# Patient Record
Sex: MALE | Race: WHITE | Employment: FULL TIME | URBAN - METROPOLITAN AREA
[De-identification: names, ages, dates, MRNs, and addresses within clinical notes are randomized per-mention and may not be internally consistent; named-entity substitution may affect disease eponyms.]

---

## 2020-09-15 PROBLEM — J44.9 COPD (CHRONIC OBSTRUCTIVE PULMONARY DISEASE) (HCC): Status: ACTIVE | Noted: 2020-07-14

## 2020-09-15 PROBLEM — E34.9 HYPOTESTOSTERONEMIA: Status: ACTIVE | Noted: 2020-07-14

## 2020-09-15 PROBLEM — R73.02 IGT (IMPAIRED GLUCOSE TOLERANCE): Status: ACTIVE | Noted: 2020-09-15

## 2020-09-15 PROBLEM — G47.33 OSA (OBSTRUCTIVE SLEEP APNEA): Status: ACTIVE | Noted: 2020-08-11

## 2020-09-15 PROBLEM — I25.10 ASCVD (ARTERIOSCLEROTIC CARDIOVASCULAR DISEASE): Status: ACTIVE | Noted: 2020-07-14

## 2020-09-15 PROBLEM — F41.0 PANIC ANXIETY SYNDROME: Status: ACTIVE | Noted: 2020-07-14

## 2020-09-15 PROBLEM — E03.9 HYPOTHYROIDISM (ACQUIRED): Status: ACTIVE | Noted: 2020-09-15

## 2020-09-15 PROBLEM — N52.9 ED (ERECTILE DYSFUNCTION): Status: ACTIVE | Noted: 2020-09-15

## 2020-09-15 PROBLEM — G89.29 CHRONIC BACK PAIN: Status: ACTIVE | Noted: 2020-07-14

## 2020-09-15 PROBLEM — I10 HTN (HYPERTENSION): Status: ACTIVE | Noted: 2020-07-14

## 2020-09-15 PROBLEM — Z98.890 S/P LUMBAR SPINE OPERATION: Status: ACTIVE | Noted: 2020-09-15

## 2020-09-15 PROBLEM — E78.5 HLD (HYPERLIPIDEMIA): Status: ACTIVE | Noted: 2020-07-14

## 2020-09-15 PROBLEM — K21.9 GERD (GASTROESOPHAGEAL REFLUX DISEASE): Status: ACTIVE | Noted: 2020-09-15

## 2020-09-15 PROBLEM — M54.9 CHRONIC BACK PAIN: Status: ACTIVE | Noted: 2020-07-14

## 2020-09-29 PROBLEM — H10.10 ALLERGIC CONJUNCTIVITIS: Status: ACTIVE | Noted: 2020-09-29

## 2020-10-05 PROBLEM — R09.02 HYPOXIA: Status: ACTIVE | Noted: 2020-10-05

## 2020-10-05 PROBLEM — R07.89 ATYPICAL CHEST PAIN: Status: ACTIVE | Noted: 2020-10-05

## 2020-10-22 ENCOUNTER — TRANSCRIBE ORDER (OUTPATIENT)
Dept: SCHEDULING | Age: 55
End: 2020-10-22

## 2020-10-22 ENCOUNTER — HOSPITAL ENCOUNTER (OUTPATIENT)
Dept: MRI IMAGING | Age: 55
Discharge: HOME | End: 2020-10-22
Attending: ANESTHESIOLOGY
Payer: COMMERCIAL

## 2020-10-22 DIAGNOSIS — M54.16 LUMBAR RADICULOPATHY: ICD-10-CM

## 2020-10-22 DIAGNOSIS — M54.16 LUMBAR RADICULOPATHY: Primary | ICD-10-CM

## 2020-10-22 PROCEDURE — A9575 INJ GADOTERATE MEGLUMI 0.1ML: HCPCS

## 2020-10-22 PROCEDURE — 74011250636 HC RX REV CODE- 250/636

## 2020-10-22 PROCEDURE — 72158 MRI LUMBAR SPINE W/O & W/DYE: CPT

## 2020-10-22 RX ORDER — GADOTERATE MEGLUMINE 376.9 MG/ML
22 INJECTION INTRAVENOUS
Status: COMPLETED | OUTPATIENT
Start: 2020-10-22 | End: 2020-10-22

## 2020-10-22 RX ORDER — SODIUM CHLORIDE 0.9 % (FLUSH) 0.9 %
10 SYRINGE (ML) INJECTION
Status: COMPLETED | OUTPATIENT
Start: 2020-10-22 | End: 2020-10-22

## 2020-10-22 RX ADMIN — GADOTERATE MEGLUMINE 22 ML: 376.9 INJECTION INTRAVENOUS at 18:00

## 2020-10-22 RX ADMIN — Medication 10 ML: at 18:00

## 2020-11-22 ENCOUNTER — APPOINTMENT (OUTPATIENT)
Dept: GENERAL RADIOLOGY | Age: 55
End: 2020-11-22
Attending: EMERGENCY MEDICINE

## 2020-11-22 ENCOUNTER — HOSPITAL ENCOUNTER (EMERGENCY)
Age: 55
Discharge: HOME | End: 2020-11-22
Attending: EMERGENCY MEDICINE

## 2020-11-22 VITALS
DIASTOLIC BLOOD PRESSURE: 113 MMHG | OXYGEN SATURATION: 97 % | SYSTOLIC BLOOD PRESSURE: 154 MMHG | TEMPERATURE: 97.9 F | HEART RATE: 69 BPM | WEIGHT: 248 LBS | BODY MASS INDEX: 32.87 KG/M2 | RESPIRATION RATE: 12 BRPM | HEIGHT: 73 IN

## 2020-11-22 DIAGNOSIS — R07.89 ATYPICAL CHEST PAIN: Primary | ICD-10-CM

## 2020-11-22 DIAGNOSIS — F41.1 ANXIETY STATE: ICD-10-CM

## 2020-11-22 DIAGNOSIS — I25.10 ASCVD (ARTERIOSCLEROTIC CARDIOVASCULAR DISEASE): ICD-10-CM

## 2020-11-22 LAB
ANION GAP SERPL CALC-SCNC: 4 MMOL/L (ref 7–16)
ATRIAL RATE: 73 BPM
BASOPHILS # BLD: 0.1 K/UL (ref 0–0.2)
BASOPHILS NFR BLD: 1 % (ref 0–2)
BUN SERPL-MCNC: 10 MG/DL (ref 6–23)
CALCIUM SERPL-MCNC: 8.3 MG/DL (ref 8.3–10.4)
CALCULATED P AXIS, ECG09: 43 DEGREES
CALCULATED R AXIS, ECG10: 75 DEGREES
CALCULATED T AXIS, ECG11: 70 DEGREES
CHLORIDE SERPL-SCNC: 105 MMOL/L (ref 98–107)
CO2 SERPL-SCNC: 31 MMOL/L (ref 21–32)
CREAT SERPL-MCNC: 0.96 MG/DL (ref 0.8–1.5)
DIAGNOSIS, 93000: NORMAL
DIFFERENTIAL METHOD BLD: NORMAL
EOSINOPHIL # BLD: 0.3 K/UL (ref 0–0.8)
EOSINOPHIL NFR BLD: 5 % (ref 0.5–7.8)
ERYTHROCYTE [DISTWIDTH] IN BLOOD BY AUTOMATED COUNT: 13.1 % (ref 11.9–14.6)
GLUCOSE SERPL-MCNC: 87 MG/DL (ref 65–100)
HCT VFR BLD AUTO: 47.5 % (ref 41.1–50.3)
HGB BLD-MCNC: 16 G/DL (ref 13.6–17.2)
IMM GRANULOCYTES # BLD AUTO: 0 K/UL (ref 0–0.5)
IMM GRANULOCYTES NFR BLD AUTO: 0 % (ref 0–5)
LYMPHOCYTES # BLD: 1.8 K/UL (ref 0.5–4.6)
LYMPHOCYTES NFR BLD: 26 % (ref 13–44)
MCH RBC QN AUTO: 29.1 PG (ref 26.1–32.9)
MCHC RBC AUTO-ENTMCNC: 33.7 G/DL (ref 31.4–35)
MCV RBC AUTO: 86.4 FL (ref 79.6–97.8)
MONOCYTES # BLD: 0.6 K/UL (ref 0.1–1.3)
MONOCYTES NFR BLD: 8 % (ref 4–12)
NEUTS SEG # BLD: 4.2 K/UL (ref 1.7–8.2)
NEUTS SEG NFR BLD: 60 % (ref 43–78)
NRBC # BLD: 0 K/UL (ref 0–0.2)
P-R INTERVAL, ECG05: 162 MS
PLATELET # BLD AUTO: 192 K/UL (ref 150–450)
PMV BLD AUTO: 11.1 FL (ref 9.4–12.3)
POTASSIUM SERPL-SCNC: 3.6 MMOL/L (ref 3.5–5.1)
Q-T INTERVAL, ECG07: 394 MS
QRS DURATION, ECG06: 96 MS
QTC CALCULATION (BEZET), ECG08: 434 MS
RBC # BLD AUTO: 5.5 M/UL (ref 4.23–5.6)
SODIUM SERPL-SCNC: 140 MMOL/L (ref 136–145)
TROPONIN-HIGH SENSITIVITY: 12.2 PG/ML (ref 0–14)
TROPONIN-HIGH SENSITIVITY: 13.4 PG/ML (ref 0–14)
VENTRICULAR RATE, ECG03: 73 BPM
WBC # BLD AUTO: 7 K/UL (ref 4.3–11.1)

## 2020-11-22 PROCEDURE — 96375 TX/PRO/DX INJ NEW DRUG ADDON: CPT

## 2020-11-22 PROCEDURE — 84484 ASSAY OF TROPONIN QUANT: CPT

## 2020-11-22 PROCEDURE — 96374 THER/PROPH/DIAG INJ IV PUSH: CPT

## 2020-11-22 PROCEDURE — 74011250637 HC RX REV CODE- 250/637: Performed by: EMERGENCY MEDICINE

## 2020-11-22 PROCEDURE — 93005 ELECTROCARDIOGRAM TRACING: CPT | Performed by: EMERGENCY MEDICINE

## 2020-11-22 PROCEDURE — 71045 X-RAY EXAM CHEST 1 VIEW: CPT

## 2020-11-22 PROCEDURE — 74011250636 HC RX REV CODE- 250/636: Performed by: EMERGENCY MEDICINE

## 2020-11-22 PROCEDURE — 99284 EMERGENCY DEPT VISIT MOD MDM: CPT

## 2020-11-22 PROCEDURE — 85025 COMPLETE CBC W/AUTO DIFF WBC: CPT

## 2020-11-22 PROCEDURE — 80048 BASIC METABOLIC PNL TOTAL CA: CPT

## 2020-11-22 RX ORDER — MORPHINE SULFATE 4 MG/ML
4 INJECTION INTRAVENOUS
Status: COMPLETED | OUTPATIENT
Start: 2020-11-22 | End: 2020-11-22

## 2020-11-22 RX ORDER — LORAZEPAM 2 MG/ML
0.5 INJECTION INTRAMUSCULAR
Status: DISCONTINUED | OUTPATIENT
Start: 2020-11-22 | End: 2020-11-22 | Stop reason: HOSPADM

## 2020-11-22 RX ORDER — ACETAMINOPHEN 500 MG
1000 TABLET ORAL
Status: COMPLETED | OUTPATIENT
Start: 2020-11-22 | End: 2020-11-22

## 2020-11-22 RX ORDER — LORAZEPAM 2 MG/ML
0.5 INJECTION INTRAMUSCULAR
Status: COMPLETED | OUTPATIENT
Start: 2020-11-22 | End: 2020-11-22

## 2020-11-22 RX ADMIN — NITROGLYCERIN 0.5 INCH: 20 OINTMENT TOPICAL at 07:21

## 2020-11-22 RX ADMIN — ACETAMINOPHEN 1000 MG: 500 TABLET, FILM COATED ORAL at 09:44

## 2020-11-22 RX ADMIN — LORAZEPAM 0.5 MG: 2 INJECTION INTRAMUSCULAR; INTRAVENOUS at 07:21

## 2020-11-22 RX ADMIN — MORPHINE SULFATE 4 MG: 4 INJECTION INTRAVENOUS at 09:44

## 2020-11-22 NOTE — ED NOTES
I have reviewed discharge instructions with the patient. The patient verbalized understanding. Patient left ED via Discharge Method: ambulatory to Home with self. Opportunity for questions and clarification provided. Patient given 0 scripts. To continue your aftercare when you leave the hospital, you may receive an automated call from our care team to check in on how you are doing. This is a free service and part of our promise to provide the best care and service to meet your aftercare needs.  If you have questions, or wish to unsubscribe from this service please call [phone]. Thank you for Choosing our [facility] Emergency Department.

## 2020-11-22 NOTE — DISCHARGE INSTRUCTIONS
Metabolic Panel:   Recent Labs     11/22/20  0642      K 3.6      CO2 31   AGAP 4*   BUN 10   CREA 0.96   CA 8.3   GLU 87     Blood Counts  Recent Labs     11/22/20  0642   WBC 7.0   RBC 5.50   HGB 16.0   HCT 47.5        XR CHEST SNGL V   Final Result   IMPRESSION:      1. No evidence of pneumonia or pulmonary edema.

## 2020-11-22 NOTE — ED NOTES
Pt presents to the ED for c/o chest pain.   Pt states that he has a hx cardiac problems and has a stent/  Pt states that he had some anxiety with the chest pain

## 2020-11-22 NOTE — ED TRIAGE NOTES
Pt states that he woke up this morning with left sided chest pain. Pt states that he has heart stints in place. Pt also stating that he felt like he couldn't breath and he felt anxious.

## 2020-11-22 NOTE — ED PROVIDER NOTES
[facility] EMERGENCY DEPT   Arrival Date/Time: 11/22/2020 @ [facility]  MRN: [id number]      60 y.o. male    YOB: 1965   Telephone Information:   Mobile [phone] (home)     Seen in: ER08/08  Seen on 11/22/2020 @ 7:08 AM       Today's Chief Complaint:   Chief Complaint   Patient presents with    Chest Pain     HPI (4+): 59 yo male with hx of CAD -- s/p cath and stent in 2007 and 2010; as well as hx of KIRAN   -- he has CPAP and O2 concentrator. sts his concentrator broke Saturday night and he woke up this morning in 'a panic attack'  C/o left sided chest pain, sharp pain, No radiation  No fever, no nausea, no vomiting       HPI    Review of Systems (10+): Review of Systems   Constitutional: Negative for activity change, appetite change, chills and fever. HENT: Negative. Eyes: Negative. Respiratory: Negative for cough, chest tightness, shortness of breath and wheezing. Cardiovascular: Positive for chest pain. Gastrointestinal: Negative for nausea and vomiting. Genitourinary: Negative. Musculoskeletal: Negative. Skin: Negative. Neurological: Negative. Psychiatric/Behavioral: Negative for agitation. The patient is nervous/anxious. Past Medical History: Primary Care Doctor: Meron Ponce MD  [X] Meds, Medical Hx, Surgical Hx, Family Hx, Social Hx are reviewed & located at the end of this note     Allergies: No Known Allergies      Key Anti-Platelet Anticoagulant Meds             aspirin delayed-release 81 mg tablet (Taking) Take 81 mg by mouth daily. Physical Exam (8+):  [X] Nursing documentation reviewed.       Patient Vitals for the past 24 hrs:   Temp Pulse Resp BP SpO2   11/22/20 0734  69 12 (!) 145/71 97 %   11/22/20 0721  66  (!) 143/65    11/22/20 0704  67 12 (!) 143/65 98 %   11/22/20 0640  72  (!) 163/76 99 %   11/22/20 0637 97.9 °F (36.6 °C) 68 16 (!) 166/79 97 %   11/22/20 0635    (!) 166/79       Estimated body mass index is 32.72 kg/m² as calculated from the following:    Height as of this encounter: 6' 1\" (1.854 m). Weight as of this encounter: 112.5 kg (248 lb). Vital signs were reviewed. Physical Exam  Vitals signs and nursing note reviewed. Constitutional:       General: He is not in acute distress. Appearance: He is well-developed. He is not ill-appearing or toxic-appearing. HENT:      Head: Normocephalic. Eyes:      Pupils: Pupils are equal, round, and reactive to light. Neck:      Musculoskeletal: Normal range of motion and neck supple. Cardiovascular:      Rate and Rhythm: Normal rate and regular rhythm. Heart sounds: Normal heart sounds. Pulmonary:      Effort: Pulmonary effort is normal.      Breath sounds: Normal breath sounds. No decreased breath sounds, wheezing or rhonchi. Abdominal:      Palpations: There is no mass. Tenderness: There is no abdominal tenderness. Skin:     General: Skin is warm. Capillary Refill: Capillary refill takes less than 2 seconds. Neurological:      General: No focal deficit present. Mental Status: He is alert and oriented to person, place, and time. Psychiatric:         Mood and Affect: Mood is not anxious. Behavior: Behavior is not agitated. [facility]  MEDICAL DECISION MAKING: (Including Differential Dx, and Plan)   52-year-old male presents to the emergency department with chest pain. States it is related to running out of oxygen overnight because his concentrator broke. He has seen cardiology pulmonary family medicine and pain medicine through [facility]. Now is seeing [address] [place] [address] cardiology and has an appointment with pulmonary    History of coronary disease with stents in 2007 and 2010. He has recurrent episodes of pain which he relates to anxiety.    Differential Diagnosis: Angina, acute coronary syndrome, nonspecific chest pain   Plan:  labs     This is a new problem that does need additional workup   Labs/Radiographs/ECG were ordered: yes   CT/US/XRay/MRI were visualized by me: yes   Obtained and Reviewed Old Records or History from someone other than the patient: yes-- old records     The patient's problem is:  high    Diagnostic Options are:  mininmal risk    Management Options are:  high risk     Data/Management:  (jorge, Keyana limon)   Lab findings during this visit:   Recent Results (from the past 48 hour(s))   CBC WITH AUTOMATED DIFF    Collection Time: 11/22/20  6:42 AM   Result Value Ref Range    WBC 7.0 4.3 - 11.1 K/uL    RBC 5.50 4.23 - 5.6 M/uL    HGB 16.0 13.6 - 17.2 g/dL    HCT 47.5 41.1 - 50.3 %    MCV 86.4 79.6 - 97.8 FL    MCH 29.1 26.1 - 32.9 PG    MCHC 33.7 31.4 - 35.0 g/dL    RDW 13.1 11.9 - 14.6 %    PLATELET 461 059 - 360 K/uL    MPV 11.1 9.4 - 12.3 FL    ABSOLUTE NRBC 0.00 0.0 - 0.2 K/uL    DF AUTOMATED      NEUTROPHILS 60 43 - 78 %    LYMPHOCYTES 26 13 - 44 %    MONOCYTES 8 4.0 - 12.0 %    EOSINOPHILS 5 0.5 - 7.8 %    BASOPHILS 1 0.0 - 2.0 %    IMMATURE GRANULOCYTES 0 0.0 - 5.0 %    ABS. NEUTROPHILS 4.2 1.7 - 8.2 K/UL    ABS. LYMPHOCYTES 1.8 0.5 - 4.6 K/UL    ABS. MONOCYTES 0.6 0.1 - 1.3 K/UL    ABS. EOSINOPHILS 0.3 0.0 - 0.8 K/UL    ABS. BASOPHILS 0.1 0.0 - 0.2 K/UL    ABS. IMM.  GRANS. 0.0 0.0 - 0.5 K/UL   METABOLIC PANEL, BASIC    Collection Time: 11/22/20  6:42 AM   Result Value Ref Range    Sodium 140 136 - 145 mmol/L    Potassium 3.6 3.5 - 5.1 mmol/L    Chloride 105 98 - 107 mmol/L    CO2 31 21 - 32 mmol/L    Anion gap 4 (L) 7 - 16 mmol/L    Glucose 87 65 - 100 mg/dL    BUN 10 6 - 23 MG/DL    Creatinine 0.96 0.8 - 1.5 MG/DL    GFR est AA >60 >60 ml/min/1.73m2    GFR est non-AA >60 >60 ml/min/1.73m2    Calcium 8.3 8.3 - 10.4 MG/DL   TROPONIN-HIGH SENSITIVITY    Collection Time: 11/22/20  6:42 AM   Result Value Ref Range    Troponin-High Sensitivity 13.4 0 - 14 pg/mL   TROPONIN-HIGH SENSITIVITY    Collection Time: 11/22/20  8:49 AM   Result Value Ref Range    Troponin-High Sensitivity 12.2 0 - 14 pg/mL     Radiology studies during this visit: Xr Chest Sngl V    Result Date: 11/22/2020  IMPRESSION: 1. No evidence of pneumonia or pulmonary edema. Medications given in the ED:   Medications   LORazepam (ATIVAN) injection 0.5 mg (0.5 mg IntraVENous Given 11/22/20 0721)     Followed by   LORazepam (ATIVAN) injection 0.5 mg (has no administration in time range)   acetaminophen (TYLENOL) tablet 1,000 mg (has no administration in time range)   morphine injection 4 mg (has no administration in time range)   nitroglycerin (NITROBID) 2 % ointment 0.5 Inch (0.5 Inches Topical Given 11/22/20 0721)        Procedure Documentation:    (.addlac  .addabscess   . addreduction   . addintubation    . addprocdoc)      Procedures    Recheck and Additional Documentation:  (use .addrecheck  . addsepsis   . addstroke   . addhip  .addcctime . emergcnt )     Labs reviewed. X-ray reviewed. No evidence of pneumonia. Troponin is negative x2. EKG is normal sinus    Patient states he was prescribed oxygen in [place]. I reviewed multiple office visit notes which comment on sleep apnea COPD there is no mention of any home oxygen    Here the patient is maintaining oxygen saturations are 97%. At this time I do not believe he meets criteria for home oxygen therapy. He saw pulmonary a few months ago they did not start him on home oxygen either    His troponins are negative no evidence of coronary syndrome today    He has appointment pulmonary for next week at [facility]. This time his primary complaint is a headache which he states he frequently gets after getting nitroglycerin for the chest pain which he believes is caused by the low oxygen level.      Other ED Course Notes:     ED Course as of Nov 22 0939   Sun Nov 22, 2020   5061 ECG reviewed in the absence of a cardiologist - HR 73, NSR, normal axis, no ST/T wave changes    [GH]      ED Course User Index  [GH] Tana Mccarty MD       I wore appropriate PPE throughout this patient's ED encounter. Impression and Disposition: (.edgar lamar   . addhandoff  )     Disposition:   Discharge to home @ 2554  in stable condition. Impression:     ICD-10-CM ICD-9-CM   1. Atypical chest pain  R07.89 786.59   2. Anxiety state  F41.1 300.00   3. ASCVD (arteriosclerotic cardiovascular disease)  I25.10 429.2     440.9        Follow-up:   Follow-up Information     Follow up With Specialties Details Why Contact Info    Jeniffer Nelson MD Family Medicine   [address] [facility] [address] [facility]  [phone]      [facility] EMERGENCY DEPT Emergency Medicine  Come back to the ED if you get worse or develop any new problems [address] [address] [phone]         Discharge Medications:   Current Discharge Medication List            Past Medical History:      Past Medical History:   Diagnosis Date    Anxiety     Back pain     Chronic obstructive pulmonary disease ([address]     Depression     Hypercholesterolemia     Hypertension     Hypothyroidism     Thyroid disease      Past Surgical History:   Procedure Laterality Date    HX ORTHOPAEDIC  1984    elbow infusion     Family History   Problem Relation Age of Onset    Cancer Mother     Prostate Cancer Father       Social History     Tobacco Use    Smoking status: Former Smoker     Last attempt to quit:      Years since quittin.9    Smokeless tobacco: Never Used   Substance Use Topics    Alcohol use: Never     Frequency: Never    Drug use: Never     Prior to Admission Medications   Prescriptions Last Dose Informant Patient Reported? Taking? DISABLED PLACARD (DISABLED PLACARD) DMV 2020 at Unknown time  Yes Yes   Sig: Supply prescription to [facility] with completed form [id number]. LORazepam (ATIVAN) 1 mg tablet 2020 at Unknown time  No Yes   Sig: Take 1-2 Tabs by mouth daily as needed for Anxiety.    albuterol (PROVENTIL HFA, VENTOLIN HFA, PROAIR HFA) 90 mcg/actuation inhaler 2020 at Unknown time  Yes Yes   Sig: Take 2 Puffs by inhalation every six (6) hours as needed. aspirin delayed-release 81 mg tablet 2020 at Unknown time  Yes Yes   Sig: Take 81 mg by mouth daily. atorvastatin (LIPITOR) 80 mg tablet 2020 at Unknown time  No Yes   Sig: Take 1 Tab by mouth daily. buPROPion XL (WELLBUTRIN XL) 300 mg XL tablet 2020 at Unknown time  No Yes   Sig: Take 1 Tab by mouth daily. levothyroxine (SYNTHROID) 25 mcg tablet 2020 at Unknown time  No Yes   Sig: Take 1 Tab by mouth daily. lisinopril-hydroCHLOROthiazide (PRINZIDE, ZESTORETIC) 20-12.5 mg per tablet 2020 at Unknown time  No Yes   Sig: Take 2 Tabs by mouth daily. methadone (DOLOPHINE) 10 mg tablet 2020 at Unknown time  Yes Yes   Sig: Take 10 mg by mouth daily. metoprolol tartrate (LOPRESSOR) 25 mg tablet 2020 at Unknown time  Yes Yes   Sig: Take  by mouth two (2) times a day.   naphazoline-pheniramine (NAPHCON A) 0.025-0.3 % ophthalmic solution 2020 at Unknown time  No Yes   Sig: Apply 1-2 Drops to eye four (4) times daily as needed (Pain and/or Redness). nitroglycerin (NITROSTAT) 0.4 mg SL tablet 2020 at Unknown time  Yes Yes   Si.4 mg by SubLINGual route as needed. omeprazole (PRILOSEC) 40 mg capsule 2020 at Unknown time  No Yes   Sig: Take 1 Cap by mouth daily. sildenafil citrate (VIAGRA) 100 mg tablet 2020 at Unknown time  No Yes   Sig: Take 1 Tab by mouth daily as needed for Erectile Dysfunction. testosterone cypionate (DEPOTESTOTERONE CYPIONATE) 200 mg/mL injection 2020 at Unknown time  No Yes   Si mL by IntraMUSCular route Once every 2 weeks for 90 days. Max Daily Amount: 200 mg.   tiotropium-olodateroL (Stiolto Respimat) 2.5-2.5 mcg/actuation inhaler 2020 at Unknown time  No Yes   Sig: Take 2 Puffs by inhalation daily.       Facility-Administered Medications: None

## 2020-12-21 PROBLEM — Z79.899 CHRONIC PRESCRIPTION BENZODIAZEPINE USE: Status: ACTIVE | Noted: 2020-12-21

## 2020-12-21 PROBLEM — J44.9 COPD (CHRONIC OBSTRUCTIVE PULMONARY DISEASE) (HCC): Status: RESOLVED | Noted: 2020-07-14 | Resolved: 2020-12-21

## 2020-12-21 PROBLEM — F11.20 METHADONE DEPENDENCE (HCC): Status: ACTIVE | Noted: 2020-12-21

## 2020-12-29 PROBLEM — Z12.9 SCREENING FOR CANCER: Status: ACTIVE | Noted: 2020-12-29

## 2020-12-29 PROBLEM — Z87.891 HISTORY OF TOBACCO USE: Status: ACTIVE | Noted: 2020-12-29

## 2021-01-28 PROBLEM — Z12.9 SCREENING FOR CANCER: Status: RESOLVED | Noted: 2020-12-29 | Resolved: 2021-01-28

## 2021-04-09 ENCOUNTER — HOSPITAL ENCOUNTER (OUTPATIENT)
Dept: GENERAL RADIOLOGY | Age: 56
Discharge: HOME | End: 2021-04-09

## 2021-04-09 DIAGNOSIS — M25.561 CHRONIC PAIN OF BOTH KNEES: ICD-10-CM

## 2021-04-09 DIAGNOSIS — M25.562 CHRONIC PAIN OF BOTH KNEES: ICD-10-CM

## 2021-04-09 DIAGNOSIS — G89.29 CHRONIC PAIN OF BOTH KNEES: ICD-10-CM

## 2021-04-16 PROBLEM — M19.90 OA (OSTEOARTHRITIS): Status: ACTIVE | Noted: 2021-04-16

## 2021-05-12 ENCOUNTER — HOSPITAL ENCOUNTER (OUTPATIENT)
Age: 56
Setting detail: OBSERVATION
LOS: 1 days | Discharge: HOME | End: 2021-05-13
Attending: INTERNAL MEDICINE | Admitting: INTERNAL MEDICINE
Payer: COMMERCIAL

## 2021-05-12 ENCOUNTER — APPOINTMENT (OUTPATIENT)
Dept: GENERAL RADIOLOGY | Age: 56
End: 2021-05-12
Attending: EMERGENCY MEDICINE
Payer: COMMERCIAL

## 2021-05-12 ENCOUNTER — HOSPITAL ENCOUNTER (EMERGENCY)
Age: 56
Discharge: OTHER INSTITUTION | End: 2021-05-12
Attending: EMERGENCY MEDICINE
Payer: COMMERCIAL

## 2021-05-12 VITALS
OXYGEN SATURATION: 97 % | SYSTOLIC BLOOD PRESSURE: 133 MMHG | TEMPERATURE: 97.9 F | HEART RATE: 55 BPM | WEIGHT: 250 LBS | DIASTOLIC BLOOD PRESSURE: 69 MMHG | RESPIRATION RATE: 10 BRPM | HEIGHT: 72 IN | BODY MASS INDEX: 33.86 KG/M2

## 2021-05-12 DIAGNOSIS — R07.2 PRECORDIAL PAIN: ICD-10-CM

## 2021-05-12 DIAGNOSIS — G89.29 CHRONIC BACK PAIN, UNSPECIFIED BACK LOCATION, UNSPECIFIED BACK PAIN LATERALITY: ICD-10-CM

## 2021-05-12 DIAGNOSIS — I10 HYPERTENSION, UNSPECIFIED TYPE: ICD-10-CM

## 2021-05-12 DIAGNOSIS — E78.2 MIXED HYPERLIPIDEMIA: ICD-10-CM

## 2021-05-12 DIAGNOSIS — I25.10 ASCVD (ARTERIOSCLEROTIC CARDIOVASCULAR DISEASE): ICD-10-CM

## 2021-05-12 DIAGNOSIS — R07.9 CHEST PAIN, UNSPECIFIED TYPE: Primary | ICD-10-CM

## 2021-05-12 DIAGNOSIS — M54.9 CHRONIC BACK PAIN, UNSPECIFIED BACK LOCATION, UNSPECIFIED BACK PAIN LATERALITY: ICD-10-CM

## 2021-05-12 DIAGNOSIS — J44.9 CHRONIC OBSTRUCTIVE PULMONARY DISEASE, UNSPECIFIED COPD TYPE (HCC): ICD-10-CM

## 2021-05-12 LAB
ALBUMIN SERPL-MCNC: 3.3 G/DL (ref 3.5–5)
ALBUMIN/GLOB SERPL: 0.9 {RATIO} (ref 1.2–3.5)
ALP SERPL-CCNC: 78 U/L (ref 50–136)
ALT SERPL-CCNC: 38 U/L (ref 12–65)
ANION GAP SERPL CALC-SCNC: 6 MMOL/L (ref 7–16)
AST SERPL-CCNC: 18 U/L (ref 15–37)
ATRIAL RATE: 64 BPM
BASOPHILS # BLD: 0 K/UL (ref 0–0.2)
BASOPHILS NFR BLD: 1 % (ref 0–2)
BILIRUB SERPL-MCNC: 0.6 MG/DL (ref 0.2–1.1)
BUN SERPL-MCNC: 9 MG/DL (ref 6–23)
CALCIUM SERPL-MCNC: 8.4 MG/DL (ref 8.3–10.4)
CALCULATED R AXIS, ECG10: 71 DEGREES
CALCULATED T AXIS, ECG11: 66 DEGREES
CHLORIDE SERPL-SCNC: 104 MMOL/L (ref 98–107)
CO2 SERPL-SCNC: 33 MMOL/L (ref 21–32)
CREAT SERPL-MCNC: 0.94 MG/DL (ref 0.8–1.5)
DIAGNOSIS, 93000: NORMAL
DIFFERENTIAL METHOD BLD: NORMAL
EOSINOPHIL # BLD: 0.3 K/UL (ref 0–0.8)
EOSINOPHIL NFR BLD: 4 % (ref 0.5–7.8)
ERYTHROCYTE [DISTWIDTH] IN BLOOD BY AUTOMATED COUNT: 13.6 % (ref 11.9–14.6)
GLOBULIN SER CALC-MCNC: 3.6 G/DL (ref 2.3–3.5)
GLUCOSE SERPL-MCNC: 96 MG/DL (ref 65–100)
HCT VFR BLD AUTO: 43.5 % (ref 41.1–50.3)
HGB BLD-MCNC: 14.5 G/DL (ref 13.6–17.2)
IMM GRANULOCYTES # BLD AUTO: 0 K/UL (ref 0–0.5)
IMM GRANULOCYTES NFR BLD AUTO: 0 % (ref 0–5)
LIPASE SERPL-CCNC: 66 U/L (ref 73–393)
LYMPHOCYTES # BLD: 1.7 K/UL (ref 0.5–4.6)
LYMPHOCYTES NFR BLD: 22 % (ref 13–44)
MAGNESIUM SERPL-MCNC: 2.1 MG/DL (ref 1.8–2.4)
MCH RBC QN AUTO: 30 PG (ref 26.1–32.9)
MCHC RBC AUTO-ENTMCNC: 33.3 G/DL (ref 31.4–35)
MCV RBC AUTO: 89.9 FL (ref 79.6–97.8)
MONOCYTES # BLD: 0.6 K/UL (ref 0.1–1.3)
MONOCYTES NFR BLD: 7 % (ref 4–12)
NEUTS SEG # BLD: 5 K/UL (ref 1.7–8.2)
NEUTS SEG NFR BLD: 65 % (ref 43–78)
NRBC # BLD: 0 K/UL (ref 0–0.2)
PLATELET # BLD AUTO: 203 K/UL (ref 150–450)
PMV BLD AUTO: 11.1 FL (ref 9.4–12.3)
POTASSIUM SERPL-SCNC: 3.9 MMOL/L (ref 3.5–5.1)
PROT SERPL-MCNC: 6.9 G/DL (ref 6.3–8.2)
Q-T INTERVAL, ECG07: 432 MS
QRS DURATION, ECG06: 98 MS
QTC CALCULATION (BEZET), ECG08: 420 MS
RBC # BLD AUTO: 4.84 M/UL (ref 4.23–5.6)
SODIUM SERPL-SCNC: 143 MMOL/L (ref 136–145)
TROPONIN-HIGH SENSITIVITY: 11.2 PG/ML (ref 0–14)
TROPONIN-HIGH SENSITIVITY: 6.9 PG/ML (ref 0–14)
TROPONIN-HIGH SENSITIVITY: 8.3 PG/ML (ref 0–14)
TROPONIN-HIGH SENSITIVITY: 9 PG/ML (ref 0–14)
VENTRICULAR RATE, ECG03: 57 BPM
WBC # BLD AUTO: 7.6 K/UL (ref 4.3–11.1)

## 2021-05-12 PROCEDURE — C1769 GUIDE WIRE: HCPCS

## 2021-05-12 PROCEDURE — 84484 ASSAY OF TROPONIN QUANT: CPT

## 2021-05-12 PROCEDURE — 74011000636 HC RX REV CODE- 636: Performed by: INTERNAL MEDICINE

## 2021-05-12 PROCEDURE — 36415 COLL VENOUS BLD VENIPUNCTURE: CPT

## 2021-05-12 PROCEDURE — 99152 MOD SED SAME PHYS/QHP 5/>YRS: CPT

## 2021-05-12 PROCEDURE — 77030042317 HC BND COMPR HEMSTAT -B

## 2021-05-12 PROCEDURE — 99284 EMERGENCY DEPT VISIT MOD MDM: CPT

## 2021-05-12 PROCEDURE — 93005 ELECTROCARDIOGRAM TRACING: CPT | Performed by: EMERGENCY MEDICINE

## 2021-05-12 PROCEDURE — 93458 L HRT ARTERY/VENTRICLE ANGIO: CPT

## 2021-05-12 PROCEDURE — 74011250637 HC RX REV CODE- 250/637

## 2021-05-12 PROCEDURE — 74011250637 HC RX REV CODE- 250/637: Performed by: INTERNAL MEDICINE

## 2021-05-12 PROCEDURE — 83690 ASSAY OF LIPASE: CPT

## 2021-05-12 PROCEDURE — 85025 COMPLETE CBC W/AUTO DIFF WBC: CPT

## 2021-05-12 PROCEDURE — 80053 COMPREHEN METABOLIC PANEL: CPT

## 2021-05-12 PROCEDURE — 77030016699 HC CATH ANGI DX INFN1 CARD -A

## 2021-05-12 PROCEDURE — 71045 X-RAY EXAM CHEST 1 VIEW: CPT

## 2021-05-12 PROCEDURE — 93005 ELECTROCARDIOGRAM TRACING: CPT | Performed by: PHYSICIAN ASSISTANT

## 2021-05-12 PROCEDURE — 74011000250 HC RX REV CODE- 250: Performed by: INTERNAL MEDICINE

## 2021-05-12 PROCEDURE — 74011250636 HC RX REV CODE- 250/636: Performed by: EMERGENCY MEDICINE

## 2021-05-12 PROCEDURE — 74011250637 HC RX REV CODE- 250/637: Performed by: PHYSICIAN ASSISTANT

## 2021-05-12 PROCEDURE — 83735 ASSAY OF MAGNESIUM: CPT

## 2021-05-12 PROCEDURE — 74011250637 HC RX REV CODE- 250/637: Performed by: EMERGENCY MEDICINE

## 2021-05-12 PROCEDURE — C1894 INTRO/SHEATH, NON-LASER: HCPCS

## 2021-05-12 PROCEDURE — 74011250636 HC RX REV CODE- 250/636: Performed by: INTERNAL MEDICINE

## 2021-05-12 PROCEDURE — 99285 EMERGENCY DEPT VISIT HI MDM: CPT

## 2021-05-12 PROCEDURE — 2709999900 HC NON-CHARGEABLE SUPPLY

## 2021-05-12 PROCEDURE — 99218 HC RM OBSERVATION: CPT

## 2021-05-12 PROCEDURE — 99152 MOD SED SAME PHYS/QHP 5/>YRS: CPT | Performed by: INTERNAL MEDICINE

## 2021-05-12 PROCEDURE — 77030015766

## 2021-05-12 PROCEDURE — 93458 L HRT ARTERY/VENTRICLE ANGIO: CPT | Performed by: INTERNAL MEDICINE

## 2021-05-12 PROCEDURE — 96374 THER/PROPH/DIAG INJ IV PUSH: CPT

## 2021-05-12 RX ORDER — ALPRAZOLAM 0.25 MG/1
0.5 TABLET ORAL
Status: DISCONTINUED | OUTPATIENT
Start: 2021-05-12 | End: 2021-05-13 | Stop reason: HOSPADM

## 2021-05-12 RX ORDER — PANTOPRAZOLE SODIUM 40 MG/1
40 TABLET, DELAYED RELEASE ORAL
Status: DISCONTINUED | OUTPATIENT
Start: 2021-05-12 | End: 2021-05-13 | Stop reason: HOSPADM

## 2021-05-12 RX ORDER — FENTANYL CITRATE 50 UG/ML
25-100 INJECTION, SOLUTION INTRAMUSCULAR; INTRAVENOUS
Status: DISCONTINUED | OUTPATIENT
Start: 2021-05-12 | End: 2021-05-13 | Stop reason: HOSPADM

## 2021-05-12 RX ORDER — PANTOPRAZOLE SODIUM 40 MG/1
40 TABLET, DELAYED RELEASE ORAL
Status: DISCONTINUED | OUTPATIENT
Start: 2021-05-13 | End: 2021-05-12 | Stop reason: SDUPTHER

## 2021-05-12 RX ORDER — LEVOTHYROXINE SODIUM 50 UG/1
25 TABLET ORAL DAILY
Status: DISCONTINUED | OUTPATIENT
Start: 2021-05-13 | End: 2021-05-13 | Stop reason: HOSPADM

## 2021-05-12 RX ORDER — LIDOCAINE HYDROCHLORIDE 10 MG/ML
3-20 INJECTION, SOLUTION EPIDURAL; INFILTRATION; INTRACAUDAL; PERINEURAL ONCE
Status: COMPLETED | OUTPATIENT
Start: 2021-05-12 | End: 2021-05-12

## 2021-05-12 RX ORDER — SODIUM CHLORIDE 0.9 % (FLUSH) 0.9 %
5-40 SYRINGE (ML) INJECTION EVERY 8 HOURS
Status: DISCONTINUED | OUTPATIENT
Start: 2021-05-12 | End: 2021-05-13 | Stop reason: HOSPADM

## 2021-05-12 RX ORDER — ACETAMINOPHEN 325 MG/1
650 TABLET ORAL ONCE
Status: COMPLETED | OUTPATIENT
Start: 2021-05-12 | End: 2021-05-12

## 2021-05-12 RX ORDER — SODIUM CHLORIDE 9 MG/ML
75 INJECTION, SOLUTION INTRAVENOUS CONTINUOUS
Status: DISPENSED | OUTPATIENT
Start: 2021-05-12 | End: 2021-05-13

## 2021-05-12 RX ORDER — ASPIRIN 81 MG/1
81 TABLET ORAL DAILY
Status: DISCONTINUED | OUTPATIENT
Start: 2021-05-13 | End: 2021-05-13 | Stop reason: HOSPADM

## 2021-05-12 RX ORDER — HEPARIN SODIUM 200 [USP'U]/100ML
2 INJECTION, SOLUTION INTRAVENOUS CONTINUOUS
Status: DISCONTINUED | OUTPATIENT
Start: 2021-05-12 | End: 2021-05-13 | Stop reason: HOSPADM

## 2021-05-12 RX ORDER — SODIUM CHLORIDE 0.9 % (FLUSH) 0.9 %
5-40 SYRINGE (ML) INJECTION AS NEEDED
Status: DISCONTINUED | OUTPATIENT
Start: 2021-05-12 | End: 2021-05-13 | Stop reason: HOSPADM

## 2021-05-12 RX ORDER — NITROGLYCERIN 0.4 MG/1
0.4 TABLET SUBLINGUAL AS NEEDED
Status: DISCONTINUED | OUTPATIENT
Start: 2021-05-12 | End: 2021-05-13 | Stop reason: HOSPADM

## 2021-05-12 RX ORDER — NITROGLYCERIN 0.4 MG/1
TABLET SUBLINGUAL
Status: COMPLETED
Start: 2021-05-12 | End: 2021-05-12

## 2021-05-12 RX ORDER — ALBUTEROL SULFATE 90 UG/1
2 AEROSOL, METERED RESPIRATORY (INHALATION)
Status: DISCONTINUED | OUTPATIENT
Start: 2021-05-12 | End: 2021-05-13 | Stop reason: HOSPADM

## 2021-05-12 RX ORDER — METOPROLOL TARTRATE 25 MG/1
25 TABLET, FILM COATED ORAL 2 TIMES DAILY
Status: DISCONTINUED | OUTPATIENT
Start: 2021-05-12 | End: 2021-05-13 | Stop reason: HOSPADM

## 2021-05-12 RX ORDER — LISINOPRIL 20 MG/1
20 TABLET ORAL DAILY
Status: DISCONTINUED | OUTPATIENT
Start: 2021-05-13 | End: 2021-05-13 | Stop reason: HOSPADM

## 2021-05-12 RX ORDER — BUPROPION HYDROCHLORIDE 300 MG/1
300 TABLET ORAL DAILY
Status: DISCONTINUED | OUTPATIENT
Start: 2021-05-13 | End: 2021-05-13 | Stop reason: HOSPADM

## 2021-05-12 RX ORDER — ACETAMINOPHEN 325 MG/1
650 TABLET ORAL
Status: DISCONTINUED | OUTPATIENT
Start: 2021-05-12 | End: 2021-05-13 | Stop reason: HOSPADM

## 2021-05-12 RX ORDER — METHADONE HYDROCHLORIDE 5 MG/1
5 TABLET ORAL 2 TIMES DAILY
Status: DISCONTINUED | OUTPATIENT
Start: 2021-05-12 | End: 2021-05-13 | Stop reason: HOSPADM

## 2021-05-12 RX ORDER — KETOROLAC TROMETHAMINE 30 MG/ML
30 INJECTION, SOLUTION INTRAMUSCULAR; INTRAVENOUS ONCE
Status: COMPLETED | OUTPATIENT
Start: 2021-05-12 | End: 2021-05-12

## 2021-05-12 RX ORDER — ONDANSETRON 2 MG/ML
4 INJECTION INTRAMUSCULAR; INTRAVENOUS
Status: DISCONTINUED | OUTPATIENT
Start: 2021-05-12 | End: 2021-05-13 | Stop reason: HOSPADM

## 2021-05-12 RX ORDER — ATORVASTATIN CALCIUM 80 MG/1
80 TABLET, FILM COATED ORAL DAILY
Status: DISCONTINUED | OUTPATIENT
Start: 2021-05-13 | End: 2021-05-13 | Stop reason: HOSPADM

## 2021-05-12 RX ORDER — NITROGLYCERIN 0.4 MG/1
0.4 TABLET SUBLINGUAL
Status: DISCONTINUED | OUTPATIENT
Start: 2021-05-12 | End: 2021-05-12 | Stop reason: HOSPADM

## 2021-05-12 RX ORDER — MIDAZOLAM HYDROCHLORIDE 1 MG/ML
.5-2 INJECTION, SOLUTION INTRAMUSCULAR; INTRAVENOUS
Status: DISCONTINUED | OUTPATIENT
Start: 2021-05-12 | End: 2021-05-13 | Stop reason: HOSPADM

## 2021-05-12 RX ADMIN — MIDAZOLAM 2 MG: 1 INJECTION INTRAMUSCULAR; INTRAVENOUS at 16:00

## 2021-05-12 RX ADMIN — LIDOCAINE HYDROCHLORIDE 3 ML: 10 INJECTION, SOLUTION EPIDURAL; INFILTRATION; INTRACAUDAL; PERINEURAL at 15:54

## 2021-05-12 RX ADMIN — IOPAMIDOL 90 ML: 755 INJECTION, SOLUTION INTRAVENOUS at 16:07

## 2021-05-12 RX ADMIN — PANTOPRAZOLE SODIUM 40 MG: 40 TABLET, DELAYED RELEASE ORAL at 16:31

## 2021-05-12 RX ADMIN — METOPROLOL TARTRATE 25 MG: 25 TABLET, FILM COATED ORAL at 17:53

## 2021-05-12 RX ADMIN — FENTANYL CITRATE 25 MCG: 50 INJECTION, SOLUTION INTRAMUSCULAR; INTRAVENOUS at 15:52

## 2021-05-12 RX ADMIN — NITROGLYCERIN 0.4 MG: 0.4 TABLET SUBLINGUAL at 15:10

## 2021-05-12 RX ADMIN — NITROGLYCERIN 0.4 MG: 0.4 TABLET, ORALLY DISINTEGRATING SUBLINGUAL at 08:13

## 2021-05-12 RX ADMIN — NITROGLYCERIN 0.4 MG: 0.4 TABLET, ORALLY DISINTEGRATING SUBLINGUAL at 08:17

## 2021-05-12 RX ADMIN — HEPARIN SODIUM 2 ML: 10000 INJECTION, SOLUTION INTRAVENOUS; SUBCUTANEOUS at 15:55

## 2021-05-12 RX ADMIN — HEPARIN SODIUM 2 UNITS/HR: 5000 INJECTION, SOLUTION INTRAVENOUS; SUBCUTANEOUS at 15:49

## 2021-05-12 RX ADMIN — KETOROLAC TROMETHAMINE 30 MG: 30 INJECTION, SOLUTION INTRAMUSCULAR; INTRAVENOUS at 09:24

## 2021-05-12 RX ADMIN — ALPRAZOLAM 0.5 MG: 0.25 TABLET ORAL at 20:55

## 2021-05-12 RX ADMIN — METHADONE HYDROCHLORIDE 5 MG: 5 TABLET ORAL at 17:53

## 2021-05-12 RX ADMIN — ACETAMINOPHEN 650 MG: 325 TABLET, FILM COATED ORAL at 08:46

## 2021-05-12 RX ADMIN — MIDAZOLAM 2 MG: 1 INJECTION INTRAMUSCULAR; INTRAVENOUS at 15:52

## 2021-05-12 RX ADMIN — Medication 5 ML: at 20:59

## 2021-05-12 RX ADMIN — NITROGLYCERIN 1 INCH: 20 OINTMENT TOPICAL at 09:29

## 2021-05-12 NOTE — PROCEDURES
Brief Cardiac Procedure Note    Patient: Savanah Monroy MRN: [id number]  SSN: [id number]    YOB: 1965  Age: 60 y.o. Sex: male      Date of Procedure: 5/12/2021     Pre-procedure Diagnosis: Typical Angina    Post-procedure Diagnosis: Coronary Artery Disease    Reason for Procedure: Suspected CAD    Procedure: Left Heart Catheterization    Brief Description of Procedure: LHC    Performed By: Abel Rangel MD     Assistants: NONE    Anesthesia: Moderate Sedation    Estimated Blood Loss: Less than 10 mL      Specimens: None    Implants: None    Findings:   LV MILD INFERIOR HK, 55-60%, EDP 25-30, NO MR OR AV GRADIENT, ASC AO NORMAL  LM NORMAL  LAD MID SMOOTH 30% (PROBABLY MUSCLE BRIDGE)  LCX MINIMAL IRREGS  RCA MID AND DISTAL STENTS PATENT, FOCAL 50-60% ISR AT PROX MARGIN OF DISTAL STENT, MILD IRREG OTHERWISE    Complications: None    Recommendations: Continue medical therapy. MODERATE DISTAL RCA DISEASE BY CATH, BUT NEGATIVE HS TROP X 2 AFTER 15 HOURS OF PAIN, CONSTANT CP SINCE 6PM LAST NIGHT, BETTER WITH NTG BUT WORSE AFTER EATING AND SUPINE, ? GI MEDIATED. CONSULT GI FOR UPPER ENDOSCOPY TOMORROW, PRIOR GERD HISTORY. BID PPI FOR NOW.      Signed By: Abel Rangel MD     May 12, 2021

## 2021-05-12 NOTE — H&P
[facility] Cardiology History & Physical      Date of  Admission: 5/12/2021     Primary Care Physician: Dr. Jerry Mccarthy  Primary Cardiologist: Dr. Jesusita Brice  Referring Physician: Dr. Nathan Ashley ED  Admitting Physician: Dr. Kaycee Salmeron    CC: chest pain    HPI:  Sp Wick is a 60 y.o. male with h/o HTN, HLD, CAD s/p PCI (RCA) 2008 in [address] [place] [address] 153, KIRAN, COPD, anxiety with panic attacks, OA with chronic back pain (on Methadone) and hypothyroidism who presented to [facility] ED with c/o chest discomfort. In [facility] ED, /71, HS troponin 9.0- 6.9, CXR unremarkable and ECG showed NSR w/o acute changes. He was given SL NTG with some relief for 45 minutes- 1 hour. CP returned. He was given NTG paste and transferred to [facility] for cardiac evaluation. On arrival, Pt reports 6-7/10 SSCP with radiation to back, some nausea and SOB. Denies palpitations, vomiting, LE swelling, orthopnea or syncope. States it started last night while he was watching TV. He feels this is similar to his MI in 2008.  STAT ECG on arrival to [facility] due to active CP was normal.      Past Medical History:   Diagnosis Date    Anxiety     Back pain     Chronic obstructive pulmonary disease (HCC)     Depression     Hypercholesterolemia     Hypertension     Hypothyroidism     MI (myocardial infarction) ([facility] [address] 2008    Thyroid disease       Past Surgical History:   Procedure Laterality Date    HX BACK SURGERY      HX ORTHOPAEDIC  1984    elbow infusion    HX PTCA  2008    stents x 2       No Known Allergies   Social History     Socioeconomic History    Marital status:      Spouse name: Not on file    Number of children: Not on file    Years of education: Not on file    Highest education level: Not on file   Occupational History    Not on file   Social Needs    Financial resource strain: Not on file    Food insecurity     Worry: Not on file     Inability: Not on file    Transportation needs     Medical: Not on file     Non-medical: Not on file   Tobacco Use    Smoking status: Former Smoker     Packs/day: 1.50     Years: 17.00     Pack years: 25.50     Quit date:      Years since quittin.3    Smokeless tobacco: Never Used   Substance and Sexual Activity    Alcohol use: Never     Frequency: Never    Drug use: Never    Sexual activity: Yes     Partners: Female   Lifestyle    Physical activity     Days per week: Not on file     Minutes per session: Not on file    Stress: Not on file   Relationships    Social connections     Talks on phone: Not on file     Gets together: Not on file     Attends Latter-day service: Not on file     Active member of club or organization: Not on file     Attends meetings of clubs or organizations: Not on file     Relationship status: Not on file    Intimate partner violence     Fear of current or ex partner: Not on file     Emotionally abused: Not on file     Physically abused: Not on file     Forced sexual activity: Not on file   Other Topics Concern    Not on file   Social History Narrative     and lives with wife. Has one dog. Has lived in [facility]. Works as a . Family History   Problem Relation Age of Onset    Cancer Mother     Prostate Cancer Father     Heart Disease Father         No current facility-administered medications for this encounter. Current Outpatient Medications   Medication Sig    atorvastatin (LIPITOR) 80 mg tablet Take 1 Tab by mouth daily.  levothyroxine (SYNTHROID) 25 mcg tablet Take 1 Tab by mouth daily.  buPROPion XL (WELLBUTRIN XL) 300 mg XL tablet Take 1 Tab by mouth daily.  lisinopril-hydroCHLOROthiazide (PRINZIDE, ZESTORETIC) 20-12.5 mg per tablet Take 2 Tabs by mouth daily.  naphazoline-pheniramine (NAPHCON A) 0.025-0.3 % ophthalmic solution Apply 1-2 Drops to eye four (4) times daily as needed (Pain and/or Redness).  omeprazole (PRILOSEC) 40 mg capsule Take 1 Cap by mouth daily.     ALPRAZolam (XANAX) 0.5 mg tablet Take 1-2 Tabs by mouth daily as needed for Anxiety.  testosterone cypionate 200 mg/mL kit 200 mg by IntraMUSCular route every fourteen (14) days. Max Daily Amount: 200 mg.    metoprolol tartrate (LOPRESSOR) 25 mg tablet Take 1 Tab by mouth two (2) times a day.  methadone (DOLOPHINE) 5 mg tablet TAKE 1 TABLET BY MOUTH TWICE A DAY MUST LAST 30 DAYS    cpap machine kit by Does Not Apply route.  albuterol (PROVENTIL HFA, VENTOLIN HFA, PROAIR HFA) 90 mcg/actuation inhaler Take 2 Puffs by inhalation every six (6) hours as needed for Wheezing or Cough.  tiotropium-olodateroL (Stiolto Respimat) 2.5-2.5 mcg/actuation inhaler Take 2 Puffs by inhalation daily.  DISABLED PLACARD (DISABLED PLACARD) DMV Supply prescription to [facility] with completed form [id number].  aspirin delayed-release 81 mg tablet Take 81 mg by mouth daily.  nitroglycerin (NITROSTAT) 0.4 mg SL tablet 0.4 mg by SubLINGual route as needed.  sildenafil citrate (VIAGRA) 100 mg tablet Take 1 Tab by mouth daily as needed for Erectile Dysfunction.      Facility-Administered Medications Ordered in Other Encounters   Medication Dose Route Frequency    nitroglycerin (NITROSTAT) tablet 0.4 mg  0.4 mg SubLINGual Q5MIN PRN       Review of symptoms:  General: no recent weight loss/gain, weakness, fatigue, fever or chills   Skin: no rashes, lumps, or other skin changes   HEENT: no headache, dizziness, lightheadedness, vision changes, hearing changes, tinnitus, vertigo, sinus pressure/pain, bleeding gums, sore throat, or hoarseness   Neck: no swollen glands, goiter, pain or stiffness   Respiratory: no cough, sputum, hemoptysis, dyspnea, wheezing   Cardiovascular: +chest pain or discomfort, no palpitations, dyspnea, orthopnea, paroxysmal nocturnal dyspnea, peripheral edema   Gastrointestinal: no trouble swallowing, heartburn, change of appetite, nausea, change in bowel habits, pain with defecation, rectal bleeding or black/tarry stools, hemorrhoids, constipation, diarrhea, abdominal pain, jaundice, liver or gallbladder problems   Urinary: no frequency, urgency , hematuria, burning/pain with urination, recent flank pain, polyuria, nocturia, or difficulty urinating   Peripheral Vascular: no claudication, leg cramps, prior DVTs, swelling of calves, legs, or feet, color change, or swelling with redness or tenderness   Musculoskeletal: no muscle or joint pain/stiffness, joint swelling, erythema of joints, or back pain   Psychiatric: +depression/anxiety, mental disorders, or excessive stress   Neurological: no history of CVA, dizziness, no sensory or motor loss, seizures, syncope, tremors, numbness, tingling, no changes in mood, attention, or speech, no changes in orientation, memory, insight, or judgment. no headache, vertigo. Hematologic: no anemia, easy bruising or bleeding   Endocrine: no diabetes, thyroid problems, heat or cold intolerance, excessive sweating, polyuria, polydipsia      Subjective:   Physical Exam    General Appearance:  Well developed, well nourished, alert and oriented x 3, and individual in no acute distress. Ears/Nose/Mouth/Throat:   Hearing grossly normal.         Neck: Supple. Chest:   Lungs clear to auscultation bilaterally. Cardiovascular:  Regular rate and rhythm, S1, S2   Abdomen:   Soft, non-tender, bowel sounds are active. Extremities: No edema bilaterally. Skin: Warm and dry.            Labs:   Recent Results (from the past 24 hour(s))   TROPONIN-HIGH SENSITIVITY    Collection Time: 05/12/21  8:01 AM   Result Value Ref Range    Troponin-High Sensitivity 9.0 0 - 14 pg/mL   CBC WITH AUTOMATED DIFF    Collection Time: 05/12/21  8:01 AM   Result Value Ref Range    WBC 7.6 4.3 - 11.1 K/uL    RBC 4.84 4.23 - 5.6 M/uL    HGB 14.5 13.6 - 17.2 g/dL    HCT 43.5 41.1 - 50.3 %    MCV 89.9 79.6 - 97.8 FL    MCH 30.0 26.1 - 32.9 PG    MCHC 33.3 31.4 - 35.0 g/dL    RDW 13.6 11.9 - 14.6 %    PLATELET 202 673 - 907 K/uL    MPV 11.1 9.4 - 12.3 FL    ABSOLUTE NRBC 0. 00 0.0 - 0.2 K/uL    DF AUTOMATED      NEUTROPHILS 65 43 - 78 %    LYMPHOCYTES 22 13 - 44 %    MONOCYTES 7 4.0 - 12.0 %    EOSINOPHILS 4 0.5 - 7.8 %    BASOPHILS 1 0.0 - 2.0 %    IMMATURE GRANULOCYTES 0 0.0 - 5.0 %    ABS. NEUTROPHILS 5.0 1.7 - 8.2 K/UL    ABS. LYMPHOCYTES 1.7 0.5 - 4.6 K/UL    ABS. MONOCYTES 0.6 0.1 - 1.3 K/UL    ABS. EOSINOPHILS 0.3 0.0 - 0.8 K/UL    ABS. BASOPHILS 0.0 0.0 - 0.2 K/UL    ABS. IMM. GRANS. 0.0 0.0 - 0.5 K/UL   METABOLIC PANEL, COMPREHENSIVE    Collection Time: 05/12/21  8:01 AM   Result Value Ref Range    Sodium 143 136 - 145 mmol/L    Potassium 3.9 3.5 - 5.1 mmol/L    Chloride 104 98 - 107 mmol/L    CO2 33 (H) 21 - 32 mmol/L    Anion gap 6 (L) 7 - 16 mmol/L    Glucose 96 65 - 100 mg/dL    BUN 9 6 - 23 MG/DL    Creatinine 0.94 0.8 - 1.5 MG/DL    GFR est AA >60 >60 ml/min/1.73m2    GFR est non-AA >60 >60 ml/min/1.73m2    Calcium 8.4 8.3 - 10.4 MG/DL    Bilirubin, total 0.6 0.2 - 1.1 MG/DL    ALT (SGPT) 38 12 - 65 U/L    AST (SGOT) 18 15 - 37 U/L    Alk.  phosphatase 78 50 - 136 U/L    Protein, total 6.9 6.3 - 8.2 g/dL    Albumin 3.3 (L) 3.5 - 5.0 g/dL    Globulin 3.6 (H) 2.3 - 3.5 g/dL    A-G Ratio 0.9 (L) 1.2 - 3.5     LIPASE    Collection Time: 05/12/21  8:01 AM   Result Value Ref Range    Lipase 66 (L) 73 - 393 U/L   MAGNESIUM    Collection Time: 05/12/21  8:01 AM   Result Value Ref Range    Magnesium 2.1 1.8 - 2.4 mg/dL       Pt has been seen and examined by Dr. Tom Barlow and he agrees with the following assessment and plan:     Assessment/Plan:          Diagnosis    Chest pain- atypical with normal ECG and HS troponin, admit to telemetry, ASA, BB, ACE-I and statin, plan [facility] today, echo    ASCVD (arteriosclerotic cardiovascular disease)- s/p PCI in [address] [place] [address] 153 to RCA- ASA, BB, ACE-I and statin    HTN (hypertension)- continue home meds, monitor    HLD (hyperlipidemia)- statin    COPD (chronic obstructive pulmonary disease)- inhalers    Hypothyroidism (acquired)- continue synthroid home dose    GERD (gastroesophageal reflux disease)- PPI    IGT (impaired glucose tolerance)    KIRAN (obstructive sleep apnea)    Panic anxiety syndrome- takes Xanax at home    History of tobacco use- quit in 2008    OA (osteoarthritis)    Methadone dependence (HCC)    Chronic back pain- on Methadone 5 mg BID       Kavita Rea PA-C  ATTENDING ADDENDUM:    Patient seen and examined by me. Agree with above note by physician extender. Key findings are:  Still with waxing and waning CP, 6/10 upon arrival to [facility] DT, but ECG normal and trop negative.  + relief with NTG but recurrent pain. Prior RCA stenting in setting of inferior MI in past.  Exam benign. CV- RRR without murmur  Lungs- Clear bilaterally  Abd- soft, nontender, nondistended  Ext- no edema    Plan: As above. Labs OK, hydrate with LHC now. The benefits and risks of left heart catheterization and possible percutaneous intervention were discussed with the patient. Risks including but not limited to bleeding, infection, contrast allergy reaction, acute kidney injury, MI, stroke, emergent CABG and death were discussed. The patient understands the risks of the procedure and wishes to proceed.      Lisa Lee MD  [facility] Cardiology  Pager [phone]

## 2021-05-12 NOTE — PROGRESS NOTES
TRANSFER - OUT REPORT:    [facility]  RRA  Diagnostic no interventions  Versed 4 mg  Fentanyl 25 mcg  Band 12 ml  No bleeding/hematoma      Verbal report given to pedro(name) on Kirill Chowdary  being transferred to cpru(unit) for routine progression of care       Report consisted of patients Situation, Background, Assessment and   Recommendations(SBAR). Information from the following report(s) SBAR and Procedure Summary was reviewed with the receiving nurse. Lines:   Peripheral IV 05/12/21 Right Antecubital (Active)   Site Assessment Clean, dry, & intact 05/12/21 1425   Phlebitis Assessment 0 05/12/21 1425   Infiltration Assessment 0 05/12/21 1425   Dressing Status Clean, dry, & intact 05/12/21 1425   Dressing Type Transparent 05/12/21 1425   Hub Color/Line Status Pink 05/12/21 0815   Action Taken Blood drawn 05/12/21 0815        Opportunity for questions and clarification was provided.

## 2021-05-12 NOTE — PROGRESS NOTES
SW met with patient who confirmed demographic information, states that he lives with his wife and does not have other local family. Patient is seen by Dr. Victorio Seip for primary care and is current. He does not use assistive devices to ambulate, does not require ADL assistance, and denies any falls in the last twelve months. Patient denies any feelings of weakness or unbalance at this time, states that he's at his baseline in terms of ambulation. Patient anticipated to admit and transfer DT. SINAN/LINDA dept will continue to follow. Anticipate home with family assistance.      Johana Ruiz LMSW    [facility]    * [email or web address]

## 2021-05-12 NOTE — ED PROVIDER NOTES
Patient is a 12-year-old male with a history of hypertension, COPD, elevated cholesterol, coronary artery disease with stent placement many years ago in [place] comes to the ER this morning complaining of chest pain. He states yesterday evening he started having pressure in the substernal chest that radiated through to his upper back he had transient relief with nitroglycerin but this morning the pain persisted. He has already taken his aspirin today. No diaphoresis. The history is provided by the patient. Chest Pain   This is a new problem. The current episode started yesterday. The pain is associated with rest. The pain is present in the substernal region. The pain is moderate. The quality of the pain is described as pressure-like. The pain radiates to the upper back. The symptoms are aggravated by exertion. Associated symptoms include shortness of breath. Pertinent negatives include no abdominal pain, no back pain, no cough, no diaphoresis, no dizziness, no fever, no irregular heartbeat, no nausea, no palpitations, no vomiting and no weakness. He has tried aspirin and nitroglycerin for the symptoms. The treatment provided mild relief. Risk factors include hypertension and dyslipidemia. His past medical history is significant for HTN. Procedural history includes cardiac catheterization and cardiac stents.        Past Medical History:   Diagnosis Date    Anxiety     Back pain     Chronic obstructive pulmonary disease (HCC)     Depression     Hypercholesterolemia     Hypertension     Hypothyroidism     MI (myocardial infarction) ([facility] [address] 2008    Thyroid disease        Past Surgical History:   Procedure Laterality Date    HX BACK SURGERY      HX ORTHOPAEDIC  1984    elbow infusion    HX PTCA  2008    stents x 2         Family History:   Problem Relation Age of Onset    Cancer Mother     Prostate Cancer Father     Heart Disease Father        Social History     Socioeconomic History    Marital status:      Spouse name: Not on file    Number of children: Not on file    Years of education: Not on file    Highest education level: Not on file   Occupational History    Not on file   Social Needs    Financial resource strain: Not on file    Food insecurity     Worry: Not on file     Inability: Not on file    Transportation needs     Medical: Not on file     Non-medical: Not on file   Tobacco Use    Smoking status: Former Smoker     Packs/day: 1.50     Years: 17.00     Pack years: 25.50     Quit date:      Years since quittin.3    Smokeless tobacco: Never Used   Substance and Sexual Activity    Alcohol use: Never     Frequency: Never    Drug use: Never    Sexual activity: Yes     Partners: Female   Lifestyle    Physical activity     Days per week: Not on file     Minutes per session: Not on file    Stress: Not on file   Relationships    Social connections     Talks on phone: Not on file     Gets together: Not on file     Attends Advent service: Not on file     Active member of club or organization: Not on file     Attends meetings of clubs or organizations: Not on file     Relationship status: Not on file    Intimate partner violence     Fear of current or ex partner: Not on file     Emotionally abused: Not on file     Physically abused: Not on file     Forced sexual activity: Not on file   Other Topics Concern    Not on file   Social History Narrative     and lives with wife. Has one dog. Has lived in [place] and [place]. Works as a . ALLERGIES: Patient has no known allergies. Review of Systems   Constitutional: Negative for chills, diaphoresis, fatigue and fever. HENT: Negative for congestion, rhinorrhea and sore throat. Eyes: Negative for pain, discharge and visual disturbance. Respiratory: Positive for shortness of breath. Negative for cough. Cardiovascular: Positive for chest pain. Negative for palpitations.    Gastrointestinal: Negative for abdominal pain, diarrhea, nausea and vomiting. Endocrine: Negative for polydipsia and polyuria. Genitourinary: Negative for dysuria, frequency and urgency. Musculoskeletal: Negative for back pain and neck pain. Skin: Negative for rash. Neurological: Negative for dizziness, seizures, syncope and weakness. Hematological: Negative. Vitals:    05/12/21 0802 05/12/21 0805 05/12/21 0806 05/12/21 0813   BP: (!) 149/71      Pulse: 74 70 100 64   Resp: 18 (!) 43 15    Temp: 97.9 °F (36.6 °C)      SpO2: 96% 97% 96%    Weight: 113.4 kg (250 lb)      Height: 6' (1.829 m)               Physical Exam  Vitals signs and nursing note reviewed. Constitutional:       Appearance: Normal appearance. He is well-developed. HENT:      Head: Normocephalic and atraumatic. Nose: Nose normal.   Eyes:      Extraocular Movements: Extraocular movements intact. Conjunctiva/sclera: Conjunctivae normal.      Pupils: Pupils are equal, round, and reactive to light. Neck:      Musculoskeletal: Normal range of motion and neck supple. Cardiovascular:      Rate and Rhythm: Normal rate and regular rhythm. Heart sounds: Normal heart sounds. Pulmonary:      Effort: Pulmonary effort is normal.      Breath sounds: Normal breath sounds. Chest:      Chest wall: No mass, tenderness or crepitus. Abdominal:      Palpations: Abdomen is soft. Tenderness: There is no abdominal tenderness. There is no guarding or rebound. Musculoskeletal: Normal range of motion. General: No tenderness. Right lower leg: He exhibits no tenderness. No edema. Left lower leg: He exhibits no tenderness. No edema. Lymphadenopathy:      Cervical: No cervical adenopathy. Skin:     General: Skin is warm and dry. Capillary Refill: Capillary refill takes less than 2 seconds. Findings: No rash. Neurological:      General: No focal deficit present.       Mental Status: He is alert and oriented to person, place, and time. GCS: GCS eye subscore is 4. GCS verbal subscore is 5. GCS motor subscore is 6. Cranial Nerves: No cranial nerve deficit. Sensory: No sensory deficit. Motor: Motor function is intact. Psychiatric:         Mood and Affect: Mood is anxious. MDM  Number of Diagnoses or Management Options  Diagnosis management comments: I wore appropriate PPE throughout this patient's ED visit. Fidel Burk MD, 8:57 AM    9:16 AM  Chest x-ray is clear per my read  Basic labs unremarkable  Initial high-sensitivity troponin negative    9:26 AM  I discussed his case with Dr. Hayes Burkitt who is on-call for cardiology he will admit to the [facility] for heart cath. Nitroglycerin paste is ordered. Voice dictation software was used during the making of this note. This software is not perfect and grammatical and other typographical errors may be present. This note has been proofread, but may still contain errors. Fidel Burk MD; 5/12/2021 @9:26 AM   ===================================================================      After 2 nitroglycerin the chest pain was resolved but he complained of headache Tylenol was given. He still complaining of headache now and asking for more pain medicine. He is also requesting a heart cath for his recurrent chest pains. I will discuss the case with cardiology and will check a second repeat troponin.        Amount and/or Complexity of Data Reviewed  Clinical lab tests: ordered and reviewed  Tests in the radiology section of CPT®: ordered and reviewed  Tests in the medicine section of CPT®: ordered and reviewed  Review and summarize past medical records: yes  Discuss the patient with other providers: yes  Independent visualization of images, tracings, or specimens: yes    Risk of Complications, Morbidity, and/or Mortality  Presenting problems: moderate  Diagnostic procedures: low  Management options: low    Patient Progress  Patient progress: improved         EKG    Date/Time: 5/12/2021 8:57 AM  Performed by: Baudilio Solis MD  Authorized by: Baudilio Solis MD     ECG reviewed by ED Physician in the absence of a cardiologist: yes    Previous ECG:     Previous ECG:  Unavailable  Interpretation:     Interpretation: normal    Rate:     ECG rate:  65    ECG rate assessment: normal    Rhythm:     Rhythm: sinus rhythm    Ectopy:     Ectopy: none    QRS:     QRS axis:  Normal  ST segments:     ST segments:  Normal  T waves:     T waves: normal

## 2021-05-12 NOTE — ED TRIAGE NOTES
Pt states he has chest pain since last night, has history of 2 stent placements about 15 years ago. States pain shoots in to back and mid chest. Took one nitro today 324 mg aspirin 30 mins ago. Mask applied to pt.

## 2021-05-12 NOTE — ED NOTES
TRANSFER - OUT REPORT:    Verbal report given to [facility], RN on Karmen Barahona  being transferred to [address] [place] [address] for routine progression of care       Report consisted of patients Situation, Background, Assessment and   Recommendations(SBAR). Information from the following report(s) ED Summary, MAR, Recent Results and Cardiac Rhythm NSR was reviewed with the receiving nurse. Lines:   Peripheral IV 05/12/21 Right Antecubital (Active)   Site Assessment Clean, dry, & intact 05/12/21 0815   Phlebitis Assessment 0 05/12/21 0815   Infiltration Assessment 0 05/12/21 0815   Dressing Status Clean, dry, & intact 05/12/21 0815   Hub Color/Line Status Pink 05/12/21 0815   Action Taken Blood drawn 05/12/21 0815        Opportunity for questions and clarification was provided.       Patient transported with:  Cardiac monitorfei

## 2021-05-12 NOTE — PROGRESS NOTES
Care Management Interventions  PCP Verified by CM: Yes(Dr Larry Gutierrez)  Last Visit to PCP: 04/16/21  Mode of Transport at Discharge: Other (see comment)(Koenig, Lennox Bolds [phone] )  Transition of Care Consult (CM Consult): Discharge Planning  Discharge Durable Medical Equipment: No  Physical Therapy Consult: No  Occupational Therapy Consult: No  Current Support Network: Lives with Spouse, Own Home  Confirm Follow Up Transport: Family  Discharge Location  Discharge Placement: Home    CM reviewed medical record. Pt transferred from [facility] to [facility] rm 329 for chest pain. Pt proceeding to [facility] for LHC. Chart screened by  for discharge planning. No needs identified at this time. Please consult  if any new issues arise. 1530 CM attempted to interview patient but pt is in [facility] for LHC. CM will f/u with pt for DCP.

## 2021-05-12 NOTE — PROGRESS NOTES
SW reviewed patient's chart and conducted a baseline assessment. Discharge plan at this time is as follows:     Care Management Interventions  PCP Verified by CM: Yes  Mode of Transport at Discharge: Self  Transition of Care Consult (CM Consult): Discharge Planning  Current Support Network: Lives with Spouse, Own Home  Confirm Follow Up Transport: Family  Discharge Location  Discharge Placement: Home with family assistance      *Please note that discharge plans can change throughout an inpatient admission.  Ensure that you are referring to the most recent social work/nurse case management note for current discharge plan*     Pura Acevedo, [address] [place] [address] Work   [facility]    * [email or web address]

## 2021-05-13 ENCOUNTER — APPOINTMENT (OUTPATIENT)
Dept: ULTRASOUND IMAGING | Age: 56
End: 2021-05-13
Attending: NURSE PRACTITIONER
Payer: COMMERCIAL

## 2021-05-13 ENCOUNTER — ANESTHESIA (OUTPATIENT)
Dept: ENDOSCOPY | Age: 56
End: 2021-05-13
Payer: COMMERCIAL

## 2021-05-13 ENCOUNTER — ANESTHESIA EVENT (OUTPATIENT)
Dept: ENDOSCOPY | Age: 56
End: 2021-05-13
Payer: COMMERCIAL

## 2021-05-13 VITALS
HEART RATE: 58 BPM | WEIGHT: 253 LBS | BODY MASS INDEX: 31.46 KG/M2 | OXYGEN SATURATION: 99 % | TEMPERATURE: 98.1 F | SYSTOLIC BLOOD PRESSURE: 121 MMHG | HEIGHT: 75 IN | RESPIRATION RATE: 18 BRPM | DIASTOLIC BLOOD PRESSURE: 75 MMHG

## 2021-05-13 PROBLEM — R12 HEARTBURN: Status: ACTIVE | Noted: 2021-05-13

## 2021-05-13 LAB
ALBUMIN SERPL-MCNC: 2.9 G/DL (ref 3.5–5)
ALBUMIN/GLOB SERPL: 0.9 {RATIO} (ref 1.2–3.5)
ALP SERPL-CCNC: 69 U/L (ref 50–136)
ALT SERPL-CCNC: 42 U/L (ref 12–65)
ANION GAP SERPL CALC-SCNC: 5 MMOL/L (ref 7–16)
AST SERPL-CCNC: 21 U/L (ref 15–37)
ATRIAL RATE: 63 BPM
ATRIAL RATE: 65 BPM
BILIRUB DIRECT SERPL-MCNC: 0.1 MG/DL
BILIRUB SERPL-MCNC: 0.5 MG/DL (ref 0.2–1.1)
BUN SERPL-MCNC: 11 MG/DL (ref 6–23)
CALCIUM SERPL-MCNC: 7.7 MG/DL (ref 8.3–10.4)
CALCULATED P AXIS, ECG09: 32 DEGREES
CALCULATED P AXIS, ECG09: 36 DEGREES
CALCULATED R AXIS, ECG10: 70 DEGREES
CALCULATED R AXIS, ECG10: 78 DEGREES
CALCULATED T AXIS, ECG11: 76 DEGREES
CALCULATED T AXIS, ECG11: 81 DEGREES
CHLORIDE SERPL-SCNC: 106 MMOL/L (ref 98–107)
CHOLEST SERPL-MCNC: 112 MG/DL
CO2 SERPL-SCNC: 30 MMOL/L (ref 21–32)
CREAT SERPL-MCNC: 0.97 MG/DL (ref 0.8–1.5)
DIAGNOSIS, 93000: NORMAL
DIAGNOSIS, 93000: NORMAL
ERYTHROCYTE [DISTWIDTH] IN BLOOD BY AUTOMATED COUNT: 13.6 % (ref 11.9–14.6)
GLOBULIN SER CALC-MCNC: 3.2 G/DL (ref 2.3–3.5)
GLUCOSE SERPL-MCNC: 88 MG/DL (ref 65–100)
HCT VFR BLD AUTO: 41.5 % (ref 41.1–50.3)
HDLC SERPL-MCNC: 23 MG/DL (ref 40–60)
HDLC SERPL: 4.9 {RATIO}
HGB BLD-MCNC: 13.6 G/DL (ref 13.6–17.2)
LDLC SERPL CALC-MCNC: 67.4 MG/DL
LIPASE SERPL-CCNC: 73 U/L (ref 73–393)
LIPID PROFILE,FLP: ABNORMAL
MAGNESIUM SERPL-MCNC: 2.1 MG/DL (ref 1.8–2.4)
MCH RBC QN AUTO: 30 PG (ref 26.1–32.9)
MCHC RBC AUTO-ENTMCNC: 32.8 G/DL (ref 31.4–35)
MCV RBC AUTO: 91.6 FL (ref 79.6–97.8)
NRBC # BLD: 0 K/UL (ref 0–0.2)
P-R INTERVAL, ECG05: 178 MS
P-R INTERVAL, ECG05: 180 MS
PLATELET # BLD AUTO: 186 K/UL (ref 150–450)
PMV BLD AUTO: 11.3 FL (ref 9.4–12.3)
POTASSIUM SERPL-SCNC: 4.1 MMOL/L (ref 3.5–5.1)
PROT SERPL-MCNC: 6.1 G/DL (ref 6.3–8.2)
Q-T INTERVAL, ECG07: 378 MS
Q-T INTERVAL, ECG07: 424 MS
QRS DURATION, ECG06: 90 MS
QRS DURATION, ECG06: 96 MS
QTC CALCULATION (BEZET), ECG08: 393 MS
QTC CALCULATION (BEZET), ECG08: 433 MS
RBC # BLD AUTO: 4.53 M/UL (ref 4.23–5.6)
SODIUM SERPL-SCNC: 141 MMOL/L (ref 136–145)
TRIGL SERPL-MCNC: 108 MG/DL (ref 35–150)
VENTRICULAR RATE, ECG03: 63 BPM
VENTRICULAR RATE, ECG03: 65 BPM
VLDLC SERPL CALC-MCNC: 21.6 MG/DL (ref 6–23)
WBC # BLD AUTO: 6.8 K/UL (ref 4.3–11.1)

## 2021-05-13 PROCEDURE — 74011000250 HC RX REV CODE- 250: Performed by: REGISTERED NURSE

## 2021-05-13 PROCEDURE — 74011250636 HC RX REV CODE- 250/636: Performed by: NURSE PRACTITIONER

## 2021-05-13 PROCEDURE — 99218 HC RM OBSERVATION: CPT

## 2021-05-13 PROCEDURE — 36415 COLL VENOUS BLD VENIPUNCTURE: CPT

## 2021-05-13 PROCEDURE — 85027 COMPLETE CBC AUTOMATED: CPT

## 2021-05-13 PROCEDURE — 74011250637 HC RX REV CODE- 250/637: Performed by: PHYSICIAN ASSISTANT

## 2021-05-13 PROCEDURE — 76705 ECHO EXAM OF ABDOMEN: CPT

## 2021-05-13 PROCEDURE — 80048 BASIC METABOLIC PNL TOTAL CA: CPT

## 2021-05-13 PROCEDURE — 80061 LIPID PANEL: CPT

## 2021-05-13 PROCEDURE — 74011250636 HC RX REV CODE- 250/636: Performed by: REGISTERED NURSE

## 2021-05-13 PROCEDURE — 93005 ELECTROCARDIOGRAM TRACING: CPT | Performed by: EMERGENCY MEDICINE

## 2021-05-13 PROCEDURE — 74011250637 HC RX REV CODE- 250/637: Performed by: NURSE PRACTITIONER

## 2021-05-13 PROCEDURE — 74011250637 HC RX REV CODE- 250/637: Performed by: INTERNAL MEDICINE

## 2021-05-13 PROCEDURE — 76060000031 HC ANESTHESIA FIRST 0.5 HR: Performed by: INTERNAL MEDICINE

## 2021-05-13 PROCEDURE — 77030021593 HC FCPS BIOP ENDOSC BSC -A: Performed by: INTERNAL MEDICINE

## 2021-05-13 PROCEDURE — 83690 ASSAY OF LIPASE: CPT

## 2021-05-13 PROCEDURE — 83735 ASSAY OF MAGNESIUM: CPT

## 2021-05-13 PROCEDURE — 74011000250 HC RX REV CODE- 250: Performed by: NURSE PRACTITIONER

## 2021-05-13 PROCEDURE — 96374 THER/PROPH/DIAG INJ IV PUSH: CPT

## 2021-05-13 PROCEDURE — 99217 PR OBSERVATION CARE DISCHARGE MANAGEMENT: CPT | Performed by: INTERNAL MEDICINE

## 2021-05-13 PROCEDURE — 76040000025: Performed by: INTERNAL MEDICINE

## 2021-05-13 PROCEDURE — 88305 TISSUE EXAM BY PATHOLOGIST: CPT

## 2021-05-13 PROCEDURE — 88312 SPECIAL STAINS GROUP 1: CPT

## 2021-05-13 PROCEDURE — 80076 HEPATIC FUNCTION PANEL: CPT

## 2021-05-13 PROCEDURE — 2709999900 HC NON-CHARGEABLE SUPPLY: Performed by: INTERNAL MEDICINE

## 2021-05-13 RX ORDER — PROPOFOL 10 MG/ML
INJECTION, EMULSION INTRAVENOUS AS NEEDED
Status: DISCONTINUED | OUTPATIENT
Start: 2021-05-13 | End: 2021-05-13 | Stop reason: HOSPADM

## 2021-05-13 RX ORDER — PROPOFOL 10 MG/ML
INJECTION, EMULSION INTRAVENOUS
Status: DISCONTINUED | OUTPATIENT
Start: 2021-05-13 | End: 2021-05-13 | Stop reason: HOSPADM

## 2021-05-13 RX ORDER — PANTOPRAZOLE SODIUM 40 MG/1
40 TABLET, DELAYED RELEASE ORAL
Qty: 60 TAB | Refills: 0 | Status: SHIPPED | OUTPATIENT
Start: 2021-05-13 | End: 2021-06-23 | Stop reason: SDUPTHER

## 2021-05-13 RX ORDER — LIDOCAINE HYDROCHLORIDE 20 MG/ML
15 SOLUTION OROPHARYNGEAL
Status: COMPLETED | OUTPATIENT
Start: 2021-05-13 | End: 2021-05-13

## 2021-05-13 RX ORDER — MORPHINE SULFATE 2 MG/ML
2 INJECTION, SOLUTION INTRAMUSCULAR; INTRAVENOUS
Status: DISCONTINUED | OUTPATIENT
Start: 2021-05-13 | End: 2021-05-13 | Stop reason: HOSPADM

## 2021-05-13 RX ORDER — MAG HYDROX/ALUMINUM HYD/SIMETH 200-200-20
30 SUSPENSION, ORAL (FINAL DOSE FORM) ORAL
Status: COMPLETED | OUTPATIENT
Start: 2021-05-13 | End: 2021-05-13

## 2021-05-13 RX ORDER — SODIUM CHLORIDE, SODIUM LACTATE, POTASSIUM CHLORIDE, CALCIUM CHLORIDE 600; 310; 30; 20 MG/100ML; MG/100ML; MG/100ML; MG/100ML
INJECTION, SOLUTION INTRAVENOUS
Status: DISCONTINUED | OUTPATIENT
Start: 2021-05-13 | End: 2021-05-13 | Stop reason: HOSPADM

## 2021-05-13 RX ORDER — LIDOCAINE HYDROCHLORIDE 20 MG/ML
INJECTION, SOLUTION EPIDURAL; INFILTRATION; INTRACAUDAL; PERINEURAL AS NEEDED
Status: DISCONTINUED | OUTPATIENT
Start: 2021-05-13 | End: 2021-05-13 | Stop reason: HOSPADM

## 2021-05-13 RX ADMIN — LIDOCAINE HYDROCHLORIDE 15 ML: 20 SOLUTION ORAL; TOPICAL at 05:11

## 2021-05-13 RX ADMIN — PROPOFOL 140 MCG/KG/MIN: 10 INJECTION, EMULSION INTRAVENOUS at 12:26

## 2021-05-13 RX ADMIN — ATORVASTATIN CALCIUM 80 MG: 80 TABLET, FILM COATED ORAL at 09:21

## 2021-05-13 RX ADMIN — ALUMINUM HYDROXIDE, MAGNESIUM HYDROXIDE, AND SIMETHICONE 30 ML: 200; 200; 20 SUSPENSION ORAL at 05:11

## 2021-05-13 RX ADMIN — METOPROLOL TARTRATE 25 MG: 25 TABLET, FILM COATED ORAL at 16:26

## 2021-05-13 RX ADMIN — METHADONE HYDROCHLORIDE 5 MG: 5 TABLET ORAL at 16:25

## 2021-05-13 RX ADMIN — LEVOTHYROXINE SODIUM 25 MCG: 0.05 TABLET ORAL at 09:21

## 2021-05-13 RX ADMIN — PANTOPRAZOLE SODIUM 40 MG: 40 TABLET, DELAYED RELEASE ORAL at 16:25

## 2021-05-13 RX ADMIN — LIDOCAINE HYDROCHLORIDE 50 MG: 20 INJECTION, SOLUTION EPIDURAL; INFILTRATION; INTRACAUDAL; PERINEURAL at 12:26

## 2021-05-13 RX ADMIN — Medication 5 ML: at 05:12

## 2021-05-13 RX ADMIN — LISINOPRIL 20 MG: 20 TABLET ORAL at 09:21

## 2021-05-13 RX ADMIN — MORPHINE SULFATE 2 MG: 2 INJECTION, SOLUTION INTRAMUSCULAR; INTRAVENOUS at 06:28

## 2021-05-13 RX ADMIN — PROPOFOL 50 MG: 10 INJECTION, EMULSION INTRAVENOUS at 12:26

## 2021-05-13 RX ADMIN — METOPROLOL TARTRATE 25 MG: 25 TABLET, FILM COATED ORAL at 09:21

## 2021-05-13 RX ADMIN — ACETAMINOPHEN 650 MG: 325 TABLET ORAL at 03:32

## 2021-05-13 RX ADMIN — ASPIRIN 81 MG: 81 TABLET ORAL at 09:21

## 2021-05-13 RX ADMIN — SODIUM CHLORIDE, SODIUM LACTATE, POTASSIUM CHLORIDE, AND CALCIUM CHLORIDE: 600; 310; 30; 20 INJECTION, SOLUTION INTRAVENOUS at 12:22

## 2021-05-13 RX ADMIN — METHADONE HYDROCHLORIDE 5 MG: 5 TABLET ORAL at 09:21

## 2021-05-13 RX ADMIN — PANTOPRAZOLE SODIUM 40 MG: 40 TABLET, DELAYED RELEASE ORAL at 09:21

## 2021-05-13 NOTE — PROGRESS NOTES
Called by primary RN reporting patient complaint of chest pain. Patient underwent LHC yesterday that showed stable CAD. Patient offered nitro by nurse and patient refused. GI to see today for evaluation.      Plan:   - GI cocktail with lidocaine x 1  - could try prn pain medication if GI cocktail does not work    Phyllis Chau NP  5:02 AM

## 2021-05-13 NOTE — H&P (VIEW-ONLY)
[facility] Consult Note Primary GI Physician: Angelica shea) Referring Provider:  Vane Gonzales MD 
 
Consult Date:  5/12/2021 Admit Date:  5/12/2021 Chief Complaint:  Chest pain Subjective:  
 
History of Present Illness:  Patient is a 60 y.o. male with PMH including but not limited to CAD and GERD, who is seen in consultation at the request of Dr. Bob Josue for chest pain. He was admitted from the ER yesterday for chest pain that was similar to prior heart attacks. He underwent LHC today with minimal acute findings. He has a history of GERD that was last worked up by a GI in [place]. Last EGD was approximately 6 years ago with the finding of a hiatal hernia. He does not recall being told he had esophagitis. He takes daily Omeprazole and has ocaisional rare breakthrough HB. He describes his pain as mid chest with radiation to his back. Harika  He currently denies any pyrexia. PMH: 
Past Medical History:  
Diagnosis Date  Anxiety  Back pain  Chronic obstructive pulmonary disease ([address]  Depression  Hypercholesterolemia  Hypertension  Hypothyroidism  MI (myocardial infarction) ([facility] [address] 2008  Thyroid disease PSH: 
Past Surgical History:  
Procedure Laterality Date  HX BACK SURGERY    
 HX ORTHOPAEDIC  1984  
 elbow infusion  HX PTCA  2008  
 stents x 2 Allergies: 
No Known Allergies Home Medications: 
Prior to Admission medications Medication Sig Start Date End Date Taking? Authorizing Provider  
atorvastatin (LIPITOR) 80 mg tablet Take 1 Tab by mouth daily. 3/25/21   Aj Barraza MD  
levothyroxine (SYNTHROID) 25 mcg tablet Take 1 Tab by mouth daily. 2/1/21   Gt Yates MD  
buPROPion XL (WELLBUTRIN XL) 300 mg XL tablet Take 1 Tab by mouth daily. 2/1/21   Gt Yates MD  
lisinopril-hydroCHLOROthiazide (PRINZIDE, ZESTORETIC) 20-12.5 mg per tablet Take 2 Tabs by mouth daily.  2/1/21   Gt Yates MD naphazoline-pheniramine (NAPHCON A) 0.025-0.3 % ophthalmic solution Apply 1-2 Drops to eye four (4) times daily as needed (Pain and/or Redness). 2/1/21   Bear Huston MD  
omeprazole (PRILOSEC) 40 mg capsule Take 1 Cap by mouth daily. 2/1/21   Bear Huston MD  
ALPRAZolam Michelle Bears) 0.5 mg tablet Take 1-2 Tabs by mouth daily as needed for Anxiety. 2/1/21   Bear Huston MD  
testosterone cypionate 200 mg/mL kit 200 mg by IntraMUSCular route every fourteen (14) days. Max Daily Amount: 200 mg. 2/1/21   Bear Huston MD  
metoprolol tartrate (LOPRESSOR) 25 mg tablet Take 1 Tab by mouth two (2) times a day. 1/19/21   Lee Olvera MD  
methadone (DOLOPHINE) 5 mg tablet TAKE 1 TABLET BY MOUTH TWICE A DAY MUST LAST 30 DAYS 11/30/20   Provider, Historical  
cpap machine kit by Does Not Apply route. Provider, Historical  
albuterol (PROVENTIL HFA, VENTOLIN HFA, PROAIR HFA) 90 mcg/actuation inhaler Take 2 Puffs by inhalation every six (6) hours as needed for Wheezing or Cough. 12/1/20   Maryellen Mendez NP  
tiotropium-olodateroL (Stiolto Respimat) 2.5-2.5 mcg/actuation inhaler Take 2 Puffs by inhalation daily. 12/1/20   Maryellen Mendez NP  
DISABLED PLACARD (DISABLED PLACARD) [employer] Supply prescription to [facility] with completed form RG-007A. 8/21/20   Provider, Historical  
aspirin delayed-release 81 mg tablet Take 81 mg by mouth daily. Provider, Historical  
nitroglycerin (NITROSTAT) 0.4 mg SL tablet 0.4 mg by SubLINGual route as needed. Provider, Historical  
sildenafil citrate (VIAGRA) 100 mg tablet Take 1 Tab by mouth daily as needed for Erectile Dysfunction. 9/15/20 9/15/21  Bear Huston MD  
 
 
Hospital Medications: 
Current Facility-Administered Medications Medication Dose Route Frequency  albuterol (PROVENTIL HFA, VENTOLIN HFA, PROAIR HFA) inhaler 2 Puff  2 Puff Inhalation Q6H PRN  
 ALPRAZolam (XANAX) tablet 0.5 mg  0.5 mg Oral DAILY PRN  
 [START ON 5/13/2021] aspirin delayed-release tablet 81 mg  81 mg Oral DAILY  [START ON 2021] atorvastatin (LIPITOR) tablet 80 mg  80 mg Oral DAILY  [START ON 2021] buPROPion XL (WELLBUTRIN XL) tablet 300 mg (Patient Supplied)  300 mg Oral DAILY  [START ON 2021] levothyroxine (SYNTHROID) tablet 25 mcg  25 mcg Oral DAILY  methadone (DOLOPHINE) tablet 5 mg  5 mg Oral BID  metoprolol tartrate (LOPRESSOR) tablet 25 mg  25 mg Oral BID  nitroglycerin (NITROSTAT) tablet 0.4 mg  0.4 mg SubLINGual PRN  
 sodium chloride (NS) flush 5-40 mL  5-40 mL IntraVENous Q8H  
 sodium chloride (NS) flush 5-40 mL  5-40 mL IntraVENous PRN  
 nitroglycerin (NITROBID) 2 % ointment 1 Inch  1 Inch Topical Q6H  
 ondansetron (ZOFRAN) injection 4 mg  4 mg IntraVENous Q4H PRN  
 sodium chloride (NS) flush 5-40 mL  5-40 mL IntraVENous Q8H  
 sodium chloride (NS) flush 5-40 mL  5-40 mL IntraVENous PRN  
 acetaminophen (TYLENOL) tablet 650 mg  650 mg Oral Q4H PRN  
 fentaNYL citrate (PF) injection  mcg   mcg IntraVENous Multiple  heparin (PF) 2 units/ml in NS infusion  2 Units/hr IntraarTERial CONTINUOUS  
 midazolam (VERSED) injection 0.5-2 mg  0.5-2 mg IntraVENous Multiple  [START ON 2021] lisinopriL (PRINIVIL, ZESTRIL) tablet 20 mg  20 mg Oral DAILY  0.9% sodium chloride infusion  75 mL/hr IntraVENous CONTINUOUS  
 sodium chloride (NS) flush 5-40 mL  5-40 mL IntraVENous Q8H  
 sodium chloride (NS) flush 5-40 mL  5-40 mL IntraVENous PRN  pantoprazole (PROTONIX) tablet 40 mg  40 mg Oral ACB&D Social History: 
Social History Tobacco Use  Smoking status: Former Smoker Packs/day: 1.50 Years: 17.00 Pack years: 25.50 Quit date:  Years since quittin.3  Smokeless tobacco: Never Used Substance Use Topics  Alcohol use: Never Frequency: Never Pt denies any history of drug use, blood transfusions, or tattoos. Family History: 
Family History Problem Relation Age of Onset  Cancer Mother  Prostate Cancer Father  Heart Disease Father Review of Systems: A detailed 10 system ROS is obtained, with pertinent positives as listed above. All others are negative. Diet:   
 
Objective:  
 
Physical Exam: 
Vitals: 
Visit Vitals /74 (BP 1 Location: Right upper arm, BP Patient Position: At rest;Lying) Pulse 66 Temp 98.2 °F (36.8 °C) Resp 18 Ht 6' 3\" (1.905 m) Wt 114.8 kg (253 lb) SpO2 96% BMI 31.62 kg/m² Gen:  Pt is alert, cooperative, no acute distress Skin:  Extremities and face reveal no rashes. HEENT: Sclerae anicteric. Extra-occular muscles are intact. No oral ulcers. No abnormal pigmentation of the lips. The neck is supple. Cardiovascular: Regular rate and rhythm. No murmurs, gallops, or rubs. Respiratory:  Comfortable breathing with no accessory muscle use. Clear breath sounds anteriorly with no wheezes, rales, or rhonchi. GI:  Abdomen nondistended, soft, and nontender. Normal active bowel sounds. No enlargement of the liver or spleen. No masses palpable. Rectal:  Deferred Musculoskeletal:  No pitting edema of the lower legs. Neurological:  Gross memory appears intact. Patient is alert and oriented. Psychiatric:  Mood appears appropriate with judgement intact. Lymphatic:  No cervical or supraclavicular adenopathy. Laboratory:   
Recent Labs 05/12/21 
0801 WBC 7.6 HGB 14.5 HCT 43.5  MCV 89.9   
K 3.9  CO2 33* BUN 9  
CREA 0.94  
CA 8.4 MG 2.1 GLU 96 AP 78 AST 18 ALT 38 TBILI 0.6 ALB 3.3* TP 6.9 LPSE 66* Assessment:  
 
Active Problems: 
  Chest pain (5/12/2021) Plan:  
 
Chest Pain as outlined above. I agree with empiric PPI, plan for EGD tommorrow

## 2021-05-13 NOTE — ANESTHESIA PREPROCEDURE EVALUATION
Relevant Problems   No relevant active problems       Anesthetic History   No history of anesthetic complications            Review of Systems / Medical History  Patient summary reviewed, nursing notes reviewed and pertinent labs reviewed    Pulmonary    COPD: mild    Sleep apnea: No treatment           Neuro/Psych         Psychiatric history     Cardiovascular    Hypertension          Past MI, CAD and cardiac stents (2005)    Exercise tolerance: >4 METS  Comments: Presented with chest pain with hx of stents. Cardiac workup negative. Troponin/ekg/LHC reassuring not from cardiac source.       GI/Hepatic/Renal     GERD: well controlled           Endo/Other      Hypothyroidism: well controlled  Obesity and arthritis     Other Findings            Physical Exam    Airway  Mallampati: II  TM Distance: 4 - 6 cm  Neck ROM: normal range of motion   Mouth opening: Normal     Cardiovascular  Regular rate and rhythm,  S1 and S2 normal,  no murmur, click, rub, or gallop             Dental    Dentition: Full lower dentures and Full upper dentures     Pulmonary  Breath sounds clear to auscultation               Abdominal         Other Findings            Anesthetic Plan    ASA: 3  Anesthesia type: total IV anesthesia          Induction: Intravenous  Anesthetic plan and risks discussed with: Patient

## 2021-05-13 NOTE — INTERVAL H&P NOTE
Update History & Physical    The Patient's History and Physical of May 12,   2021 was reviewed with the patient and I examined the patient. There was no change. The surgical site was confirmed by the patient and me. Plan:  The risk, benefits, expected outcome, and alternative to the recommended procedure have been discussed with the patient. Patient understands and wants to proceed with the procedure.     Electronically signed by Zenia Montero MD on 5/13/2021 at 12:16 PM

## 2021-05-13 NOTE — DISCHARGE SUMMARY
[facility] Cardiology Discharge Summary     Patient ID:  Clarencel Sine  [id number]  57 y.o.  1965    Admit date: 5/12/2021    Discharge date:  5/13/21    Admitting Physician: Melanie Lunsford MD     Discharge Physician: CHAITANYA Cain/Dr. Dc Lowery    Admission Diagnoses: Chest pain [R07.9]    Discharge Diagnoses:    Diagnosis    Heartburn    Chest pain    OA (osteoarthritis)    History of tobacco use    Methadone dependence ([address]    Chronic prescription benzodiazepine use    Atypical chest pain    Hypoxia    Allergic conjunctivitis    S/P lumbar spine operation    Hypothyroidism (acquired)    GERD (gastroesophageal reflux disease)    ED (erectile dysfunction)    IGT (impaired glucose tolerance)    KIRAN (obstructive sleep apnea)    Hypotestosteronemia    Panic anxiety syndrome    COPD (chronic obstructive pulmonary disease) (HCC)    ASCVD (arteriosclerotic cardiovascular disease)    HLD (hyperlipidemia)    HTN (hypertension)    Chronic back pain       Cardiology Procedures this admission:  Diagnostic left heart catheterization  Consults: GI    Hospital Course: Patient presented to the emergency department of [facility] with complaints of CP improved w nitro but returned, 6-7/10 radiating to his back w nausea and SOB. Patient was evaluated and subsequently admitted for further cardiac evaluation and treatment. Cardiac enzymes were negative. LHC was planned for 5-12-21. Patient underwent cardiac catheterization by Dr. Beatrice Short and was found to have a mod in stent restenosis of the dRCA not thought to be causing his symptoms. Patient tolerated the procedure well and returned to the telemetry floor for recovery. GI was consulted. On 5-13-21 EGD showed gastritis w biopsies taken. Abdominal ultrasound showed hepatic steatosis. The afternoon of 5/13/21 patient was up feeling well without any complaints of chest pain or shortness of breath.  Patient's right radial cath site was clean, dry and intact without hematoma or bruit. Patient's labs were WNL. For maximized treated for CAD cont statin, ACE, BB. Patient was seen and examined by Dr. Kiera Solorio and determined stable and ready for discharge. The patient will follow up with [facility] Dr Jagdeep Martinez June 9 at [address] office. DISPOSITION: The patient is being discharged home in stable condition on a low saturated fat, low cholesterol and low salt diet. The patient is instructed to advance activities as tolerated to the limit of fatigue or shortness of breath. The patient is instructed to avoid all heavy lifting for 5 days. The patient is instructed to watch the cath site for bleeding/oozing; if seen, the patient is instructed to apply firm pressure with a clean cloth and call [facility] at [phone]. The patient is instructed to watch for signs of infection which include: increasing area of redness, fever/hot to touch or purulent drainage at the catheterization site. The patient is instructed not to soak in a bathtub for 7-10 days, but is cleared to shower. The patient is instructed to call the office or return to the ER for immediate evaluation for any shortness of breath or chest pain not relieved by NTG. Discharge Exam:   Visit Vitals  /75 (BP 1 Location: Left arm, BP Patient Position: At rest;Semi fowlers)   Pulse (!) 58   Temp 98.1 °F (36.7 °C)   Resp 18   Ht 6' 3\" (1.905 m)   Wt 114.8 kg (253 lb)   SpO2 99%   BMI 31.62 kg/m²     Patient has been seen by Dr. Kiera Solorio: see his progress note for exam details.     Recent Results (from the past 24 hour(s))   TROPONIN-HIGH SENSITIVITY    Collection Time: 05/12/21  6:48 PM   Result Value Ref Range    Troponin-High Sensitivity 11.2 0 - 14 pg/mL   METABOLIC PANEL, BASIC    Collection Time: 05/13/21  5:56 AM   Result Value Ref Range    Sodium 141 136 - 145 mmol/L    Potassium 4.1 3.5 - 5.1 mmol/L    Chloride 106 98 - 107 mmol/L    CO2 30 21 - 32 mmol/L    Anion gap 5 (L) 7 - 16 mmol/L    Glucose 88 65 - 100 mg/dL    BUN 11 6 - 23 MG/DL    Creatinine 0.97 0.8 - 1.5 MG/DL    GFR est AA >60 >60 ml/min/1.73m2    GFR est non-AA >60 >60 ml/min/1.73m2    Calcium 7.7 (L) 8.3 - 10.4 MG/DL   CBC W/O DIFF    Collection Time: 05/13/21  5:56 AM   Result Value Ref Range    WBC 6.8 4.3 - 11.1 K/uL    RBC 4.53 4.23 - 5.6 M/uL    HGB 13.6 13.6 - 17.2 g/dL    HCT 41.5 41.1 - 50.3 %    MCV 91.6 79.6 - 97.8 FL    MCH 30.0 26.1 - 32.9 PG    MCHC 32.8 31.4 - 35.0 g/dL    RDW 13.6 11.9 - 14.6 %    PLATELET 016 870 - 146 K/uL    MPV 11.3 9.4 - 12.3 FL    ABSOLUTE NRBC 0.00 0.0 - 0.2 K/uL   MAGNESIUM    Collection Time: 05/13/21  5:56 AM   Result Value Ref Range    Magnesium 2.1 1.8 - 2.4 mg/dL   LIPID PANEL    Collection Time: 05/13/21  5:56 AM   Result Value Ref Range    LIPID PROFILE          Cholesterol, total 112 <200 MG/DL    Triglyceride 108 35 - 150 MG/DL    HDL Cholesterol 23 (L) 40 - 60 MG/DL    LDL, calculated 67.4 <100 MG/DL    VLDL, calculated 21.6 6.0 - 23.0 MG/DL    CHOL/HDL Ratio 4.9     HEPATIC FUNCTION PANEL    Collection Time: 05/13/21  5:56 AM   Result Value Ref Range    Protein, total 6.1 (L) 6.3 - 8.2 g/dL    Albumin 2.9 (L) 3.5 - 5.0 g/dL    Globulin 3.2 2.3 - 3.5 g/dL    A-G Ratio 0.9 (L) 1.2 - 3.5      Bilirubin, total 0.5 0.2 - 1.1 MG/DL    Bilirubin, direct 0.1 <0.4 MG/DL    Alk. phosphatase 69 50 - 136 U/L    AST (SGOT) 21 15 - 37 U/L    ALT (SGPT) 42 12 - 65 U/L   LIPASE    Collection Time: 05/13/21  5:56 AM   Result Value Ref Range    Lipase 73 73 - 393 U/L         Patient Instructions:   Current Discharge Medication List      CONTINUE these medications which have NOT CHANGED    Details   atorvastatin (LIPITOR) 80 mg tablet Take 1 Tab by mouth daily. Qty: 30 Tab, Refills: 0    Associated Diagnoses: Hyperlipidemia, unspecified hyperlipidemia type      levothyroxine (SYNTHROID) 25 mcg tablet Take 1 Tab by mouth daily.   Qty: 90 Tab, Refills: 0    Associated Diagnoses: Hypothyroidism (acquired)      buPROPion XL (WELLBUTRIN XL) 300 mg XL tablet Take 1 Tab by mouth daily. Qty: 90 Tab, Refills: 0    Associated Diagnoses: Panic anxiety syndrome      lisinopril-hydroCHLOROthiazide (PRINZIDE, ZESTORETIC) 20-12.5 mg per tablet Take 2 Tabs by mouth daily. Qty: 180 Tab, Refills: 0    Associated Diagnoses: Hypertension, unspecified type      naphazoline-pheniramine (NAPHCON A) 0.025-0.3 % ophthalmic solution Apply 1-2 Drops to eye four (4) times daily as needed (Pain and/or Redness). Qty: 15 mL, Refills: 2    Associated Diagnoses: Allergic conjunctivitis of both eyes      omeprazole (PRILOSEC) 40 mg capsule Take 1 Cap by mouth daily. Qty: 90 Cap, Refills: 0    Associated Diagnoses: Gastroesophageal reflux disease without esophagitis      ALPRAZolam (XANAX) 0.5 mg tablet Take 1-2 Tabs by mouth daily as needed for Anxiety. Qty: 60 Tab, Refills: 2    Associated Diagnoses: Panic anxiety syndrome      testosterone cypionate 200 mg/mL kit 200 mg by IntraMUSCular route every fourteen (14) days. Max Daily Amount: 200 mg. Qty: 2 Kit, Refills: 2    Associated Diagnoses: Hypotestosteronemia      metoprolol tartrate (LOPRESSOR) 25 mg tablet Take 1 Tab by mouth two (2) times a day. Qty: 180 Tab, Refills: 3      methadone (DOLOPHINE) 5 mg tablet TAKE 1 TABLET BY MOUTH TWICE A DAY MUST LAST 30 DAYS      cpap machine kit by Does Not Apply route. albuterol (PROVENTIL HFA, VENTOLIN HFA, PROAIR HFA) 90 mcg/actuation inhaler Take 2 Puffs by inhalation every six (6) hours as needed for Wheezing or Cough. Qty: 1 Inhaler, Refills: 11      tiotropium-olodateroL (Stiolto Respimat) 2.5-2.5 mcg/actuation inhaler Take 2 Puffs by inhalation daily. Qty: 3 Inhaler, Refills: 3    Associated Diagnoses: Chronic obstructive pulmonary disease, unspecified COPD type ([address]      DISABLED PLACARD (DISABLED PLACARD) [facility] Supply prescription to [facility] with completed form RG-007A.       aspirin delayed-release 81 mg tablet Take 81 mg by mouth daily. nitroglycerin (NITROSTAT) 0.4 mg SL tablet 0.4 mg by SubLINGual route as needed. sildenafil citrate (VIAGRA) 100 mg tablet Take 1 Tab by mouth daily as needed for Erectile Dysfunction.   Qty: 30 Tab, Refills: 2    Associated Diagnoses: Erectile dysfunction, unspecified erectile dysfunction type               Signed:  Maximo Llanos PA-C  5/13/2021  4:10 PM

## 2021-05-13 NOTE — CONSULTS
[facility] Consult Note       Primary GI Physician: Angelica shea)    Referring Provider:  Yue Lipscomb MD    Consult Date:  5/12/2021    Admit Date:  5/12/2021    Chief Complaint:  Chest pain    Subjective:     History of Present Illness:  Patient is a 60 y.o. male with PMH including but not limited to CAD and GERD, who is seen in consultation at the request of Dr. Bailey Medina for chest pain. He was admitted from the ER yesterday for chest pain that was similar to prior heart attacks. He underwent LHC today with minimal acute findings. He has a history of GERD that was last worked up by a GI in [place]. Last EGD was approximately 6 years ago with the finding of a hiatal hernia. He does not recall being told he had esophagitis. He takes daily Omeprazole and has ocaisional rare breakthrough HB. He describes his pain as mid chest with radiation to his back. Omari Zoey He currently denies any pyrexia. PMH:  Past Medical History:   Diagnosis Date    Anxiety     Back pain     Chronic obstructive pulmonary disease ([address]     Depression     Hypercholesterolemia     Hypertension     Hypothyroidism     MI (myocardial infarction) ([address] 2008    Thyroid disease        PSH:  Past Surgical History:   Procedure Laterality Date    HX BACK SURGERY      HX ORTHOPAEDIC  1984    elbow infusion    HX PTCA  2008    stents x 2       Allergies:  No Known Allergies    Home Medications:  Prior to Admission medications    Medication Sig Start Date End Date Taking? Authorizing Provider   atorvastatin (LIPITOR) 80 mg tablet Take 1 Tab by mouth daily. 3/25/21   Chris Lara MD   levothyroxine (SYNTHROID) 25 mcg tablet Take 1 Tab by mouth daily. 2/1/21   Andre Elliott MD   buPROPion XL (WELLBUTRIN XL) 300 mg XL tablet Take 1 Tab by mouth daily. 2/1/21   Andre Elliott MD   lisinopril-hydroCHLOROthiazide (PRINZIDE, ZESTORETIC) 20-12.5 mg per tablet Take 2 Tabs by mouth daily.  2/1/21   Andre Elliott MD naphazoline-pheniramine (NAPHCON A) 0.025-0.3 % ophthalmic solution Apply 1-2 Drops to eye four (4) times daily as needed (Pain and/or Redness). 2/1/21   Abi Zendejas MD   omeprazole (PRILOSEC) 40 mg capsule Take 1 Cap by mouth daily. 2/1/21   Abi Zendejas MD   ALPRAZolam Vivian ) 0.5 mg tablet Take 1-2 Tabs by mouth daily as needed for Anxiety. 2/1/21   Abi Zendejas MD   testosterone cypionate 200 mg/mL kit 200 mg by IntraMUSCular route every fourteen (14) days. Max Daily Amount: 200 mg. 2/1/21   Abi Zendejas MD   metoprolol tartrate (LOPRESSOR) 25 mg tablet Take 1 Tab by mouth two (2) times a day. 1/19/21   Ernie Hayes MD   methadone (DOLOPHINE) 5 mg tablet TAKE 1 TABLET BY MOUTH TWICE A DAY MUST LAST 30 DAYS 11/30/20   Provider, Historical   cpap machine kit by Does Not Apply route. Provider, Historical   albuterol (PROVENTIL HFA, VENTOLIN HFA, PROAIR HFA) 90 mcg/actuation inhaler Take 2 Puffs by inhalation every six (6) hours as needed for Wheezing or Cough. 12/1/20   Jasson Aguilar NP   tiotropium-olodateroL (Stiolto Respimat) 2.5-2.5 mcg/actuation inhaler Take 2 Puffs by inhalation daily. 12/1/20   Jasson Aguilar NP   DISABLED PLACARD (DISABLED PLACARD) DMV Supply prescription to [facility] with completed form RG-007A. 8/21/20   Provider, Historical   aspirin delayed-release 81 mg tablet Take 81 mg by mouth daily. Provider, Historical   nitroglycerin (NITROSTAT) 0.4 mg SL tablet 0.4 mg by SubLINGual route as needed. Provider, Historical   sildenafil citrate (VIAGRA) 100 mg tablet Take 1 Tab by mouth daily as needed for Erectile Dysfunction.  9/15/20 9/15/21  Abi Zendejas MD       [facility] Medications:  Current Facility-Administered Medications   Medication Dose Route Frequency    albuterol (PROVENTIL HFA, VENTOLIN HFA, PROAIR HFA) inhaler 2 Puff  2 Puff Inhalation Q6H PRN    ALPRAZolam (XANAX) tablet 0.5 mg  0.5 mg Oral DAILY PRN    [START ON 5/13/2021] aspirin delayed-release tablet 81 mg  81 mg Oral DAILY    [START ON 2021] atorvastatin (LIPITOR) tablet 80 mg  80 mg Oral DAILY    [START ON 2021] buPROPion XL (WELLBUTRIN XL) tablet 300 mg (Patient Supplied)  300 mg Oral DAILY    [START ON 2021] levothyroxine (SYNTHROID) tablet 25 mcg  25 mcg Oral DAILY    methadone (DOLOPHINE) tablet 5 mg  5 mg Oral BID    metoprolol tartrate (LOPRESSOR) tablet 25 mg  25 mg Oral BID    nitroglycerin (NITROSTAT) tablet 0.4 mg  0.4 mg SubLINGual PRN    sodium chloride (NS) flush 5-40 mL  5-40 mL IntraVENous Q8H    sodium chloride (NS) flush 5-40 mL  5-40 mL IntraVENous PRN    nitroglycerin (NITROBID) 2 % ointment 1 Inch  1 Inch Topical Q6H    ondansetron (ZOFRAN) injection 4 mg  4 mg IntraVENous Q4H PRN    sodium chloride (NS) flush 5-40 mL  5-40 mL IntraVENous Q8H    sodium chloride (NS) flush 5-40 mL  5-40 mL IntraVENous PRN    acetaminophen (TYLENOL) tablet 650 mg  650 mg Oral Q4H PRN    fentaNYL citrate (PF) injection  mcg   mcg IntraVENous Multiple    heparin (PF) 2 units/ml in NS infusion  2 Units/hr IntraarTERial CONTINUOUS    midazolam (VERSED) injection 0.5-2 mg  0.5-2 mg IntraVENous Multiple    [START ON 2021] lisinopriL (PRINIVIL, ZESTRIL) tablet 20 mg  20 mg Oral DAILY    0.9% sodium chloride infusion  75 mL/hr IntraVENous CONTINUOUS    sodium chloride (NS) flush 5-40 mL  5-40 mL IntraVENous Q8H    sodium chloride (NS) flush 5-40 mL  5-40 mL IntraVENous PRN    pantoprazole (PROTONIX) tablet 40 mg  40 mg Oral ACB&D       Social History:  Social History     Tobacco Use    Smoking status: Former Smoker     Packs/day: 1.50     Years: 17.00     Pack years: 25.50     Quit date:      Years since quittin.3    Smokeless tobacco: Never Used   Substance Use Topics    Alcohol use: Never     Frequency: Never       Pt denies any history of drug use, blood transfusions, or tattoos.     Family History:  Family History   Problem Relation Age of Onset    Cancer Mother     Prostate Cancer Father     Heart Disease Father        Review of Systems:  A detailed 10 system ROS is obtained, with pertinent positives as listed above. All others are negative. Diet:      Objective:     Physical Exam:  Vitals:  Visit Vitals  /74 (BP 1 Location: Right upper arm, BP Patient Position: At rest;Lying)   Pulse 66   Temp 98.2 °F (36.8 °C)   Resp 18   Ht 6' 3\" (1.905 m)   Wt 114.8 kg (253 lb)   SpO2 96%   BMI 31.62 kg/m²     Gen:  Pt is alert, cooperative, no acute distress  Skin:  Extremities and face reveal no rashes. HEENT: Sclerae anicteric. Extra-occular muscles are intact. No oral ulcers. No abnormal pigmentation of the lips. The neck is supple. Cardiovascular: Regular rate and rhythm. No murmurs, gallops, or rubs. Respiratory:  Comfortable breathing with no accessory muscle use. Clear breath sounds anteriorly with no wheezes, rales, or rhonchi. GI:  Abdomen nondistended, soft, and nontender. Normal active bowel sounds. No enlargement of the liver or spleen. No masses palpable. Rectal:  Deferred  Musculoskeletal:  No pitting edema of the lower legs. Neurological:  Gross memory appears intact. Patient is alert and oriented. Psychiatric:  Mood appears appropriate with judgement intact. Lymphatic:  No cervical or supraclavicular adenopathy. Laboratory:    Recent Labs     05/12/21  0801   WBC 7.6   HGB 14.5   HCT 43.5      MCV 89.9      K 3.9      CO2 33*   BUN 9   CREA 0.94   CA 8.4   MG 2.1   GLU 96   AP 78   AST 18   ALT 38   TBILI 0.6   ALB 3.3*   TP 6.9   LPSE 66*          Assessment:     Active Problems:    Chest pain (5/12/2021)        Plan:     Chest Pain as outlined above.   I agree with empiric PPI, plan for EGD tommorrow

## 2021-05-13 NOTE — PROCEDURES
Esophagogastroduodenoscopy    DATE of PROCEDURE: 5/13/2021    MEDICATIONS ADMINISTERED: MAC    INSTRUMENT: GIF    PROCEDURE:  After obtaining informed consent, the patient was placed in the left lateral position and sedated. The endoscope was advanced under direct vision without difficulty. The esophagus, stomach (including retroflexed views) and duodenum were evaluated. The patient was taken to the recovery area in stable condition. FINDINGS:  ESOPHAGUS:  Esophagus is normal except slightly irregular z-line at 40 spanning 2 cm. Biopsies taken for pathology. STOMACH:  Mild antral gastritis noted and biopsies taken  DUODENUM: NOrmal    Estimated blood loss: 0-minimal     PLAN:  1. PPI 40mg daily  2. No real explanation for symptoms based on egd. F/up pathology as outpt  3. LFTs and lipase are normal therefore resume diet after US.   4. From GI standpoint ok for discharge and follow-up as outpt    Nancy Wise MD  [facility] [place]

## 2021-05-13 NOTE — ROUTINE PROCESS
Admission skin assessment completed with second RN and reveals the following: sacrum intact. Surgical scar noted at base of lumbar. Patient's skin all intact without any breakdown noted.
Bedside and Verbal shift change report given to Fish Red RN (oncoming nurse) by self, RN (offgoing nurse). Report included the following information SBAR, Kardex, Intake/Output, MAR and Recent Results.
Bedside and Verbal shift change report given to self, RN (oncoming nurse) by Prateek Liang RN (offgoing nurse). Report included the following information SBAR, Kardex, Intake/Output, MAR and Recent Results. TR band still intact. No air present. Site CDI.
I have reviewed discharge instructions with the patient. The patient verbalized understanding.
Report given to Princess Alex for patient in room 329. Made aware that the patient had egd with biopsy and will be going to ultrasound from here for abd ultrasound. Also made aware of pain and that patient states can wait until after ultrasound to get meds. Will be transported to ultrasound by hospital transport.
TRANSFER - IN REPORT:    Verbal report received from CCL, RN on Savanah Monroy being received from [facility] for routine progression of care      Report consisted of patients Situation, Background, Assessment and Recommendations(SBAR). Information from the following report(s) SBAR, Kardex, Procedure Summary, Intake/Output and Med Rec Status was reviewed with the receiving nurse. Opportunity for questions and clarification was provided. Assessment completed upon patients arrival to unit and care assumed.
TRANSFER - IN REPORT:    Verbal report received from Cara Camarena RN on Goldy Leaver being received from [facility] for routine progression of care      Report consisted of patients Situation, Background, Assessment and Recommendations(SBAR). Information from the following report(s) SBAR, Kardex, ED Summary, Intake/Output, MAR and Med Rec Status was reviewed with the receiving nurse. Opportunity for questions and clarification was provided. Assessment completed upon patients arrival to unit and care assumed.
yes

## 2021-05-13 NOTE — DISCHARGE INSTRUCTIONS
Patient Education   No lifting of 10 lbs or more for 7 days, no tub baths for a week, may shower, no creams, lotions powders, or ointments to site for a week. Patient Education        Upper GI Endoscopy: What to Expect at [address] had an upper GI endoscopy. Your doctor used a thin, lighted tube that bends to look at the inside of your esophagus, your stomach, and the first part of the small intestine, called the duodenum. After you have an endoscopy, you will stay at the hospital or clinic for 1 to 2 hours. This will allow the medicine to wear off. You will be able to go home after your doctor or nurse checks to make sure that you're not having any problems. You may have to stay overnight if you had treatment during the test. You may have a sore throat for a day or two after the test.  This care sheet gives you a general idea about what to expect after the test.  How can you care for yourself at home? Activity   · Rest as much as you need to after you go home. · You should be able to go back to your usual activities the day after the test.  Diet   · Follow your doctor's directions for eating after the test.  · Drink plenty of fluids (unless your doctor has told you not to). Medications   · If you have a sore throat the day after the test, use an over-the-counter spray to numb your throat. Follow-up care is a key part of your treatment and safety. Be sure to make and go to all appointments, and call your doctor if you are having problems. It's also a good idea to know your test results and keep a list of the medicines you take. When should you call for help? Call 911 anytime you think you may need emergency care. For example, call if:    · You passed out (lost consciousness).     · You have trouble breathing.     · You pass maroon or bloody stools.    Call your doctor now or seek immediate medical care if:    · You have pain that does not get better after your take pain medicine.     · You have new or worse belly pain.     · You have blood in your stools.     · You are sick to your stomach and cannot keep fluids down.     · You have a fever.     · You cannot pass stools or gas. Watch closely for changes in your health, and be sure to contact your doctor if:    · Your throat still hurts after a day or two.     · You do not get better as expected. Where can you learn more? Go to http://www.smith.com/  Enter J454 in the search box to learn more about \"Upper GI Endoscopy: What to Expect at Home. \"  Current as of: April 15, 2020               Content Version: 12.8  © 8384-6988 [employer]. Care instructions adapted under license by [employer] (which disclaims liability or warranty for this information). If you have questions about a medical condition or this instruction, always ask your healthcare professional. [address] any warranty or liability for your use of this information. Left Heart Catheterization: About This Test  What is it? Left heart catheterization is a test to check the left side of your heart. Your doctor might look at the shape of your heart, the motion of your heart, or the blood pressure inside the chambers. Why is this test done? This test gives information about how your heart is working. It can:  · Check blood flow and blood pressure in the chambers of the heart. · Check the pumping action of the heart. · Find out if a heart defect is present and how severe it is. · Find out how well the heart valves work. How is the test done? · You will get medicine to help you relax. · A thin tube called a catheter is put into a blood vessel in the groin or the arm. The doctor moves the catheter through the blood vessel into your heart. · You will get a shot to numb the skin where the catheter goes in. · Dye may be injected into your heart.  Your doctor can watch on special monitors as the dye moves in your heart. The dye helps your doctor see blood flow in your heart. · If a heart defect is found, cardiac catheterization sometimes is used to correct it during the test.  · You will stay in a room for at least a few hours to make sure the catheter site starts to heal. You may have a bandage or a compression device on your groin or arm to prevent bleeding. · If the catheter was placed in your groin, you may lie in bed for a few hours. If the catheter was put in your arm, you will need to keep your arm still for at least 1 hour. How long does it take? The test will take about 30 minutes. If a problem is found and the doctor treats it, the test can take a few hours longer. What happens after the test?  · You may or may not need to stay in the hospital overnight. You will get more instructions for what to do at home. · Drink plenty of fluids for several hours after the test.  Follow-up care is a key part of your treatment and safety. Be sure to make and go to all appointments, and call your doctor if you are having problems. It's also a good idea to know your test results and keep a list of the medicines you take. Where can you learn more? Go to http://www.gray.com/  Enter W306 in the search box to learn more about \"Left Heart Catheterization: About This Test.\"  Current as of: August 31, 2020               Content Version: 12.8  © 6068-5280 [employer]. Care instructions adapted under license by [employer] (which disclaims liability or warranty for this information). If you have questions about a medical condition or this instruction, always ask your healthcare professional. [address] any warranty or liability for your use of this information.     Patient Education      Pantoprazole (Protonix) - (By mouth)   Why this medicine is used:   Treats gastroesophageal reflux disease (GERD), a damaged esophagus, and high levels of stomach acid.  Contact a nurse or doctor right away if you have:  · Blistering, peeling, red skin rash  · Swelling, muscle pain, stiffness, cramps, or twitching  · Joint pain, rash on your cheeks or arms that gets worse in the sun  · Dark-colored urine, change in how much or how often you urinate  · Seizures, dizziness, uneven heartbeat  · Severe diarrhea, stomach cramps, fever, weight gain     Common side effects:  · Mild diarrhea, stomach pain  · Headache, tiredness  © 2017 [facility] Information is for End User's use only and may not be sold, redistributed or otherwise used for commercial purposes.

## 2021-05-13 NOTE — PROGRESS NOTES
Care Management Interventions  PCP Verified by CM: Yes(Dr Timmy Hopson)  Last Visit to PCP: 04/16/21  Mode of Transport at Discharge: Other (see comment)(Carla Villagomez Edi [phone] )  Transition of Care Consult (CM Consult): Discharge Planning  Discharge Durable Medical Equipment: No  Physical Therapy Consult: No  Occupational Therapy Consult: No  Current Support Network: Lives with Spouse, Own Home  Confirm Follow Up Transport: Family  Discharge Location  Discharge Placement: Home  Pt with discharge order. No CM needs identified. Pt has personal transport.

## 2021-05-13 NOTE — PROGRESS NOTES
Complains of epigastric pain similar to pain pre procedure. Spoke with Dr Brandee Hanson , patient going from here to ultrasound and will not be on sat monitor. Patient states can \" hold off on meds \" until after ultrasound.

## 2021-05-13 NOTE — PROGRESS NOTES
TRANSFER - IN REPORT:    Verbal report received from [facility] on [facility]  being received from [phone] for ordered procedure      Report consisted of patients Situation, Background, Assessment and   Recommendations(SBAR). Information from the following report(s) SBAR, Intake/Output, MAR, Recent Results, Med Rec Status and Pre Procedure Checklist was reviewed with the receiving nurse. Opportunity for questions and clarification was provided.

## 2021-05-13 NOTE — PROCEDURES
[facility]  CARDIAC CATH    Name:  Pipo Gamble  MR#:  [id number]  :  1965  ACCOUNT #:  [de-identified]  DATE OF SERVICE:  2021    REFERRING PHYSICIAN:  Noé Miller MD    PROCEDURES PERFORMED:  Left heart cath with coronary angiography and left ventriculogram.  Total contrast 90 mL of Isovue. PREOPERATIVE DIAGNOSES:  Recurrent substernal chest pain relieved with nitroglycerin, worrisome for angina with a prior history of inferior myocardial infarction and right coronary artery stents in [place]. POSTOPERATIVE DIAGNOSES:  Stable moderate severity coronary artery disease with noncardiac chest pain, most likely. SURGEON:  Steph Coker MD    ASSISTANT:  None. ESTIMATED BLOOD LOSS:  Less than 5 mL. SPECIMENS REMOVED:  None. COMPLICATIONS:  None. IMPLANTS:  None. ANESTHESIA:  The patient was sedated by Cheryl Lucks with 4 mg Versed, 25 mcg fentanyl and monitored from 3:52 to 4:11 p.m. TECHNIQUE:  After informed consent was obtained, the patient was brought to the cath lab, prepped and draped in usual fashion. A 6-Sami sheath was placed in the right radial artery via the micropuncture modified Seldinger technique and left heart catheterization performed using standard 5-Sami angled pigtail and Tiger catheters. Manual pressure will be applied to the patient's access site via TR band protocol. There were no complications. PRESSURE RESULTS:  Aorta 145/90. Left ventricle 148/25-30. LEFT VENTRICULOGRAM:  Reveals mild inferior hypokinesis but ejection fraction is still estimated to be normal and at least 55%. There is no mitral regurgitation and no aortic valve gradient on catheter pullback. The ascending aorta is normal.  End-diastolic pressure is significantly elevated. CORONARY ANATOMY:  The left main is short and angiographically normal, dividing into an LAD and circumflex in the usual fashion.     The LAD supplies multiple small diagonal branches, all of which have mild luminal irregularity. The LAD itself has a long midvessel 30% stenosis which is probably at least partially an intramyocardial muscle bridge, but there is no flow limitation and smooth narrowing here. The remainder of the LAD has minimal luminal irregularity. Circumflex is a moderate-caliber nondominant system which supplies two moderate caliber obtuse marginal branches and terminates in a small atrioventricular branch. The entire circumflex distribution has minimal luminal irregularity. The right coronary artery has mild irregular disease up to 20% in the proximal vessel. There is a stent in the mid vessel which is patent with smooth restenosis up to 20% to 30% at most at the distal aspect of the mid stent. In the distal vessel just proximal to the bifurcation, there is another stent which has focal napkin ring 50% to 60% restenosis at the proximal margin of the stented region but there is no flow limitation here and minimal restenosis in the remainder of the stent. The vessel bifurcates into a moderate-caliber posterior descending and posterolateral branches, both of which have minimal irregularity. CONCLUSIONS:  1. Patent RCA stents with moderate severity focal restenosis in the proximal margin of the more distal RCA stent. 2.  Preserved ejection fraction with minimal inferior hypokinesis with a history of inferior MI about 15 years ago in [place]. 3.  The patient has continuous chest discomfort with a normal EKG x2, normal high-sensitivity troponin x2, and his symptoms are relieved with nitroglycerin temporarily but worse with eating and worse with supine posture. He has known reflux disease as well. The stenosis in the distal right coronary artery is moderate severity at most and I do not think this is causing continuous chest pain, especially for the past 18 hours.   His exam, EKG, and high-sensitivity troponins are negative despite nearly 18 hours of continuous discomfort. We will have GI see him to consider upper endoscopy tomorrow and start twice-daily proton pump inhibition. Chronic pain medications will remain unchanged.       Sallie Cordoba MD      [id number]  D:  05/12/2021 16:26  T:  05/13/2021 9:14  JOB #:  [id number]  CC:  Tan Escalante MD       [facility] Cardiology

## 2021-05-13 NOTE — PROGRESS NOTES
[facility] [[email or web address]] CARDIOLOGY PROGRESS NOTE           5/13/2021 3:54 PM    Admit Date: 5/12/2021      Subjective:   Positional chest pain persists. Objective:      Vitals:    05/13/21 1303 05/13/21 1313 05/13/21 1323 05/13/21 1538   BP: (!) 105/57 (!) 111/59 (!) 106/56 121/75   Pulse: (!) 56 (!) 57 (!) 57 (!) 58   Resp: 16 16 16 18   Temp:    98.1 °F (36.7 °C)   SpO2: 98% 98% 100% 99%   Weight:       Height:           Physical Exam:  General-No Acute Distress  Neck- supple, no JVD  CV- regular rate and rhythm no MRG  Lung- clear bilaterally  Abd- soft, nontender, nondistended  Ext- no edema bilaterally. Skin- warm and dry    Data Review:   Recent Labs     05/13/21  0556 05/12/21  0801    143   K 4.1 3.9   MG 2.1 2.1   BUN 11 9   CREA 0.97 0.94   GLU 88 96   WBC 6.8 7.6   HGB 13.6 14.5   HCT 41.5 43.5    203   CHOL 112  --    LDLC 67.4  --    HDL 23*  --        Assessment/Plan:     Principal Problem:    Chest pain (5/12/2021)        Active Problems:    Heartburn (5/13/2021)      ////    CV stable after EGD. OK for discharge.             Nadja Mena MD  5/13/2021 3:54 PM

## 2021-05-13 NOTE — PROGRESS NOTES
Patient complaining of chest pain. Declines nitro. Tylenol given at [address] [facility],[address].    9178: Patient still complaining of pain. Kyung Frankel, NP notified. New orders for morphine 2 mg IVP q4. Orders placed. Will administer medication and continue to monitor.

## 2021-05-17 NOTE — ANESTHESIA POSTPROCEDURE EVALUATION
Procedure(s):  ESOPHAGOGASTRODUODENOSCOPY (EGD) ROOM 329  ESOPHAGOGASTRODUODENAL (EGD) BIOPSY.     total IV anesthesia    Anesthesia Post Evaluation      Multimodal analgesia: multimodal analgesia used between 6 hours prior to anesthesia start to PACU discharge  Patient location during evaluation: bedside  Patient participation: complete - patient participated  Level of consciousness: awake and alert  Pain management: adequate  Airway patency: patent  Anesthetic complications: no  Cardiovascular status: acceptable  Respiratory status: acceptable  Hydration status: acceptable  Post anesthesia nausea and vomiting:  controlled  Final Post Anesthesia Temperature Assessment:  Normothermia (36.0-37.5 degrees C)      INITIAL Post-op Vital signs:   Vitals Value Taken Time   /75 05/13/21 1538   Temp 36.7 °C (98.1 °F) 05/13/21 1538   Pulse 58 05/13/21 1538   Resp 18 05/13/21 1538   SpO2 99 % 05/13/21 1538

## 2021-06-22 PROBLEM — Z00.00 HEALTHCARE MAINTENANCE: Status: ACTIVE | Noted: 2021-06-22

## 2021-06-23 PROBLEM — R12 HEARTBURN: Status: RESOLVED | Noted: 2021-05-13 | Resolved: 2021-06-23

## 2021-06-23 PROBLEM — R09.02 HYPOXIA: Status: RESOLVED | Noted: 2020-10-05 | Resolved: 2021-06-23

## 2021-06-23 PROBLEM — R07.89 ATYPICAL CHEST PAIN: Status: RESOLVED | Noted: 2020-10-05 | Resolved: 2021-06-23

## 2021-06-23 PROBLEM — R07.9 CHEST PAIN: Status: RESOLVED | Noted: 2021-05-12 | Resolved: 2021-06-23

## 2021-07-06 PROBLEM — M16.12 PRIMARY OSTEOARTHRITIS OF LEFT HIP: Status: ACTIVE | Noted: 2021-07-06

## 2021-08-24 ENCOUNTER — HOSPITAL ENCOUNTER (OUTPATIENT)
Dept: REHABILITATION | Age: 56
Discharge: HOME | End: 2021-08-24
Payer: COMMERCIAL

## 2021-08-24 ENCOUNTER — HOSPITAL ENCOUNTER (OUTPATIENT)
Dept: SURGERY | Age: 56
Discharge: HOME | End: 2021-08-24
Attending: ORTHOPAEDIC SURGERY
Payer: COMMERCIAL

## 2021-08-24 VITALS
RESPIRATION RATE: 18 BRPM | DIASTOLIC BLOOD PRESSURE: 67 MMHG | HEIGHT: 72 IN | SYSTOLIC BLOOD PRESSURE: 105 MMHG | WEIGHT: 248 LBS | HEART RATE: 58 BPM | TEMPERATURE: 98.3 F | OXYGEN SATURATION: 97 % | BODY MASS INDEX: 33.59 KG/M2

## 2021-08-24 LAB
ALBUMIN SERPL-MCNC: 3.7 G/DL (ref 3.5–5)
ANION GAP SERPL CALC-SCNC: 0 MMOL/L (ref 7–16)
APTT PPP: 25.9 SEC (ref 24.1–35.1)
BACTERIA SPEC CULT: NORMAL
BASOPHILS # BLD: 0 K/UL (ref 0–0.2)
BASOPHILS NFR BLD: 0 % (ref 0–2)
BUN SERPL-MCNC: 20 MG/DL (ref 6–23)
CALCIUM SERPL-MCNC: 8.7 MG/DL (ref 8.3–10.4)
CHLORIDE SERPL-SCNC: 104 MMOL/L (ref 98–107)
CO2 SERPL-SCNC: 32 MMOL/L (ref 21–32)
CREAT SERPL-MCNC: 1.2 MG/DL (ref 0.8–1.5)
DIFFERENTIAL METHOD BLD: ABNORMAL
EOSINOPHIL # BLD: 0.2 K/UL (ref 0–0.8)
EOSINOPHIL NFR BLD: 3 % (ref 0.5–7.8)
ERYTHROCYTE [DISTWIDTH] IN BLOOD BY AUTOMATED COUNT: 14.8 % (ref 11.9–14.6)
EST. AVERAGE GLUCOSE BLD GHB EST-MCNC: 111 MG/DL
GLUCOSE SERPL-MCNC: 108 MG/DL (ref 65–100)
HBA1C MFR BLD: 5.5 % (ref 4.2–6.3)
HCT VFR BLD AUTO: 48.9 % (ref 41.1–50.3)
HGB BLD-MCNC: 15.9 G/DL (ref 13.6–17.2)
IMM GRANULOCYTES # BLD AUTO: 0 K/UL (ref 0–0.5)
IMM GRANULOCYTES NFR BLD AUTO: 0 % (ref 0–5)
INR PPP: 1
LYMPHOCYTES # BLD: 1.6 K/UL (ref 0.5–4.6)
LYMPHOCYTES NFR BLD: 18 % (ref 13–44)
MCH RBC QN AUTO: 30.3 PG (ref 26.1–32.9)
MCHC RBC AUTO-ENTMCNC: 32.5 G/DL (ref 31.4–35)
MCV RBC AUTO: 93.1 FL (ref 79.6–97.8)
MONOCYTES # BLD: 0.5 K/UL (ref 0.1–1.3)
MONOCYTES NFR BLD: 6 % (ref 4–12)
NEUTS SEG # BLD: 6.7 K/UL (ref 1.7–8.2)
NEUTS SEG NFR BLD: 74 % (ref 43–78)
NRBC # BLD: 0 K/UL (ref 0–0.2)
PLATELET # BLD AUTO: 209 K/UL (ref 150–450)
PMV BLD AUTO: 12.1 FL (ref 9.4–12.3)
POTASSIUM SERPL-SCNC: 3.9 MMOL/L (ref 3.5–5.1)
PROTHROMBIN TIME: 13.3 SEC (ref 12.6–14.5)
RBC # BLD AUTO: 5.25 M/UL (ref 4.23–5.6)
SERVICE CMNT-IMP: NORMAL
SODIUM SERPL-SCNC: 136 MMOL/L (ref 136–145)
WBC # BLD AUTO: 9.2 K/UL (ref 4.3–11.1)

## 2021-08-24 PROCEDURE — 36415 COLL VENOUS BLD VENIPUNCTURE: CPT

## 2021-08-24 PROCEDURE — 77030012341 HC CHMB SPCR OPTC MDI VYRM -A

## 2021-08-24 PROCEDURE — 85025 COMPLETE CBC W/AUTO DIFF WBC: CPT

## 2021-08-24 PROCEDURE — 80307 DRUG TEST PRSMV CHEM ANLYZR: CPT

## 2021-08-24 PROCEDURE — 82040 ASSAY OF SERUM ALBUMIN: CPT

## 2021-08-24 PROCEDURE — 94664 DEMO&/EVAL PT USE INHALER: CPT

## 2021-08-24 PROCEDURE — 87641 MR-STAPH DNA AMP PROBE: CPT

## 2021-08-24 PROCEDURE — 85730 THROMBOPLASTIN TIME PARTIAL: CPT

## 2021-08-24 PROCEDURE — 83036 HEMOGLOBIN GLYCOSYLATED A1C: CPT

## 2021-08-24 PROCEDURE — 77030027138 HC INCENT SPIROMETER -A

## 2021-08-24 PROCEDURE — 94760 N-INVAS EAR/PLS OXIMETRY 1: CPT

## 2021-08-24 PROCEDURE — 80048 BASIC METABOLIC PNL TOTAL CA: CPT

## 2021-08-24 PROCEDURE — 85610 PROTHROMBIN TIME: CPT

## 2021-08-24 PROCEDURE — 97161 PT EVAL LOW COMPLEX 20 MIN: CPT

## 2021-08-24 RX ORDER — CYCLOBENZAPRINE HCL 10 MG
10 TABLET ORAL
COMMUNITY
Start: 2021-07-27 | End: 2021-11-24 | Stop reason: ALTCHOICE

## 2021-08-24 RX ORDER — DICLOFENAC SODIUM 50 MG/1
50 TABLET, DELAYED RELEASE ORAL 2 TIMES DAILY
COMMUNITY
Start: 2021-07-14

## 2021-08-24 RX ORDER — NAPROXEN SODIUM 220 MG
220 TABLET ORAL 2 TIMES DAILY WITH MEALS
COMMUNITY

## 2021-08-24 NOTE — PERIOP NOTES
PLEASE CONTINUE TAKING ALL PRESCRIPTION MEDICATIONS UP TO THE DAY OF SURGERY UNLESS OTHERWISE DIRECTED BELOW. DISCONTINUE all vitamins, herbals and supplements 21 days prior to surgery. DISCONTINUE Non-Steriodal Anti-Inflammatory (NSAIDS) such as Advil, Ibuprofen, and Aleve 5 days prior to surgery. Home Medications to HOLD      All vitamins, supplements, and herbals stop today. All NSAIDs such as Advil, Aleve, Ibuprofen, Diclofenac, Naproxen, etc. Stop 5 days prior to surgery. *IT IS OK TO TAKE TYLENOL IF NEEDED PRIOR TO SURGERY*     Home Medications to take  the day of surgery   Xanax if needed, Atorvastatin, Metoprolol, eye drops if needed, Wellbutrin XL, Methadone, 81 mg Aspirin, Protonix, Flexeril if needed, Levothyroxine, Nitroglycerin if needed        Comments   *The day before surgery, 9/2/21, take Acetaminophen (Tylenol) 1000mg in the morning and 1000 mg at bedtime*   BRING: Nitroglycerin, Wellbutrin XL, CPAP, bottle of soap (Dynahex), and incentive spirometer    COVID swab: 8/31/21 @ 9:25 AM--[address] [place], [place]       Please do not bring home medications with you on the day of surgery unless otherwise directed by your nurse. If you are instructed to bring home medications, please give them to your nurse as they will be administered by the nursing staff. If you have any questions, please call [address] [phone] or [facility] [phone]. Copy of above instructions given to patient.

## 2021-08-24 NOTE — ADVANCED PRACTICE NURSE
Total Joint Surgery Preoperative Chart Review      Patient ID:  Iman Bonilla  [id number]  29 y.o.  1965  Surgeon: Dr. Cindy Vanegas  Date of Surgery: 9/3/2021  Procedure: Total Left Hip Arthroplasty  Primary Care Physician: Edu Moctezuma -[phone]  Specialty Physician(s):      Subjective:   Iman Bonilla is a 60 y.o. WHITE/NON- male who presents for preoperative evaluation for Total Left Hip arthroplasty. This is a preoperative chart review note based on data collected by the nurse at the surgical Pre-Assessment visit.     Past Medical History:   Diagnosis Date    Anxiety     Back pain     CAD (coronary artery disease)     Followed by [address] [place] [address] Cardiology, Dr. Fabrice Rizzo, Nitroglycerin PRN-last used 2021    Chronic obstructive pulmonary disease ([facility]     Patient does not have any inhalers at this time, and would like to switch pulmonologist      Depression     GERD (gastroesophageal reflux disease)     managed with medication     H/O echocardiogram 2020    EF 60-65%    Hypercholesterolemia     managed with medication     Hypertension     managed with medication     Hypothyroidism     managed with medication     MI (myocardial infarction) ([facility]     2 stents placed at that time, daily 81 mg ASA, followed by Dr. Vanessa Rob Nausea & vomiting     KIRAN on CPAP     Thyroid disease       Past Surgical History:   Procedure Laterality Date    HX GI  2021    EGD     HX HEART CATHETERIZATION  2021    HX LUMBAR FUSION      HX ORTHOPAEDIC  1984    elbow infusion    HX PTCA  2008    stents x 2     Family History   Problem Relation Age of Onset    Cancer Mother     Prostate Cancer Father     Heart Disease Father       Social History     Tobacco Use    Smoking status: Former Smoker     Packs/day: 1.50     Years: 17.00     Pack years: 25.50     Quit date:      Years since quittin.6    Smokeless tobacco: Never Used   Substance Use Topics    Alcohol use: Never Prior to Admission medications    Medication Sig Start Date End Date Taking? Authorizing Provider   cyclobenzaprine (FLEXERIL) 10 mg tablet Take 10 mg by mouth three (3) times daily as needed. 7/27/21  Yes Provider, Historical   diclofenac EC (VOLTAREN) 50 mg EC tablet Take 50 mg by mouth two (2) times a day. 7/14/21  Yes Provider, Historical   naproxen sodium (Aleve) 220 mg tablet Take 220 mg by mouth two (2) times daily (with meals). Yes Provider, Historical   ALPRAZolam (XANAX) 0.5 mg tablet Take 1-2 Tablets by mouth daily as needed for Anxiety. 6/28/21  Yes Eloy Barraza MD   testosterone cypionate 200 mg/mL kit 200 mg by IntraMUSCular route every fourteen (14) days. Max Daily Amount: 200 mg. 6/23/21  Yes Eloy Barraza MD   naphazoline-pheniramine [facility] A) 0.025-0.3 % ophthalmic solution Apply 1-2 Drops to eye four (4) times daily as needed (Pain and/or Redness). 6/23/21  Yes Eloy Barraza MD   lisinopril-hydroCHLOROthiazide (PRINZIDE, ZESTORETIC) 20-12.5 mg per tablet Take 2 Tablets by mouth daily. 6/23/21  Yes Eloy Barraza MD   levothyroxine (SYNTHROID) 50 mcg tablet Take 1 Tablet by mouth daily. 6/23/21  Yes Eloy Barraza MD   buPROPion XL (WELLBUTRIN XL) 300 mg XL tablet Take 1 Tablet by mouth daily. 6/23/21  Yes Eloy Barraza MD   atorvastatin (LIPITOR) 80 mg tablet Take 1 Tablet by mouth daily. 6/23/21  Yes Eloy Barraza MD   pantoprazole (PROTONIX) 40 mg tablet Take 1 Tablet by mouth Before breakfast and dinner. 6/23/21  Yes Eloy Barraza MD   metoprolol succinate (TOPROL-XL) 25 mg XL tablet Take 1 Tablet by mouth daily. 6/23/21  Yes Eloy Barraza MD   methadone (DOLOPHINE) 5 mg tablet TAKE 1 TABLET BY MOUTH TWICE A DAY MUST LAST 30 DAYS 11/30/20  Yes Provider, Historical   cpap machine kit by Does Not Apply route. Yes Provider, Historical   aspirin delayed-release 81 mg tablet Take 81 mg by mouth daily.    Yes Provider, Historical   nitroglycerin (NITROSTAT) 0.4 mg SL tablet 0.4 mg by SubLINGual route as needed. Yes Provider, Historical   sildenafil citrate (VIAGRA) 100 mg tablet Take 1 Tab by mouth daily as needed for Erectile Dysfunction. 9/15/20 9/15/21 Yes Tiffani Godoy MD   DISABLED VIOLETA (DISABLED PLACARD) DMV Supply prescription to [place] [facility] with completed form RG-007A. 8/21/20   Provider, Historical     No Known Allergies       Objective:     Physical Exam:   Patient Vitals for the past 24 hrs:   Temp Pulse Resp BP SpO2   08/24/21 0907 98.3 °F (36.8 °C) (!) 58 18 105/67 97 %       ECG:    EKG Results     None          Data Review:   Labs:   Recent Results (from the past 24 hour(s))   CBC WITH AUTOMATED DIFF    Collection Time: 08/24/21  7:55 AM   Result Value Ref Range    WBC 9.2 4.3 - 11.1 K/uL    RBC 5.25 4.23 - 5.6 M/uL    HGB 15.9 13.6 - 17.2 g/dL    HCT 48.9 41.1 - 50.3 %    MCV 93.1 79.6 - 97.8 FL    MCH 30.3 26.1 - 32.9 PG    MCHC 32.5 31.4 - 35.0 g/dL    RDW 14.8 (H) 11.9 - 14.6 %    PLATELET 099 461 - 786 K/uL    MPV 12.1 9.4 - 12.3 FL    ABSOLUTE NRBC 0.00 0.0 - 0.2 K/uL    DF AUTOMATED      NEUTROPHILS 74 43 - 78 %    LYMPHOCYTES 18 13 - 44 %    MONOCYTES 6 4.0 - 12.0 %    EOSINOPHILS 3 0.5 - 7.8 %    BASOPHILS 0 0.0 - 2.0 %    IMMATURE GRANULOCYTES 0 0.0 - 5.0 %    ABS. NEUTROPHILS 6.7 1.7 - 8.2 K/UL    ABS. LYMPHOCYTES 1.6 0.5 - 4.6 K/UL    ABS. MONOCYTES 0.5 0.1 - 1.3 K/UL    ABS. EOSINOPHILS 0.2 0.0 - 0.8 K/UL    ABS. BASOPHILS 0.0 0.0 - 0.2 K/UL    ABS. IMM.  GRANS. 0.0 0.0 - 0.5 K/UL   HEMOGLOBIN A1C WITH EAG    Collection Time: 08/24/21  7:55 AM   Result Value Ref Range    Hemoglobin A1c 5.5 4.2 - 6.3 %    Est. average glucose 837 mg/dL   METABOLIC PANEL, BASIC    Collection Time: 08/24/21  7:55 AM   Result Value Ref Range    Sodium 136 136 - 145 mmol/L    Potassium 3.9 3.5 - 5.1 mmol/L    Chloride 104 98 - 107 mmol/L    CO2 32 21 - 32 mmol/L    Anion gap 0 (L) 7 - 16 mmol/L    Glucose 108 (H) 65 - 100 mg/dL    BUN 20 6 - 23 MG/DL    Creatinine 1.20 0.8 - 1.5 MG/DL    GFR est AA >60 >60 ml/min/1.73m2    GFR est non-AA >60 >60 ml/min/1.73m2    Calcium 8.7 8.3 - 10.4 MG/DL   PROTHROMBIN TIME + INR    Collection Time: 08/24/21  7:55 AM   Result Value Ref Range    Prothrombin time 13.3 12.6 - 14.5 sec    INR 1.0     PTT    Collection Time: 08/24/21  7:55 AM   Result Value Ref Range    aPTT 25.9 24.1 - 35.1 SEC   ALBUMIN    Collection Time: 08/24/21  7:55 AM   Result Value Ref Range    Albumin 3.7 3.5 - 5.0 g/dL   MSSA/MRSA SC BY PCR, NASAL SWAB    Collection Time: 08/24/21  8:31 AM    Specimen: Nasal swab   Result Value Ref Range    Special Requests: NO SPECIAL REQUESTS      Culture result:        SA target not detected. A MRSA NEGATIVE, SA NEGATIVE test result does not preclude MRSA or SA nasal colonization. Problem List:  )  Patient Active Problem List   Diagnosis Code    Hypotestosteronemia E34.9    Panic anxiety syndrome F41.0    COPD (chronic obstructive pulmonary disease) ([facility] [address] [facility] J44.9    ASCVD (arteriosclerotic cardiovascular disease) I25.10    HLD (hyperlipidemia) E78.5    HTN (hypertension) I10    Chronic back pain M54.9, G89.29    KIRAN (obstructive sleep apnea) G47.33    S/P lumbar spine operation Z98.890    Hypothyroidism (acquired) E03.9    GERD (gastroesophageal reflux disease) K21.9    ED (erectile dysfunction) N52.9    IGT (impaired glucose tolerance) R73.02    Allergic conjunctivitis H10.10    [facility] F11.20    Chronic prescription benzodiazepine use Z79.899    History of tobacco use Z87.891    OA (osteoarthritis) M19.90    Healthcare maintenance Z00.00    Primary osteoarthritis of left hip M16.12       Total Joint Surgery Pre-Assessment Recommendations: The patient is not compliant with wearing CPAP. Recommend oxygen saturation monitoring during hospitalization and oxygen at 3 liter via nc qhs.   PEP therapy BID  Albuterol every 6 hours as need during hospitalization.      Signed By: JEAN-CLAUDE Biswas    August 24, 2021

## 2021-08-24 NOTE — PERIOP NOTES
The below lab results are within anesthesia limits. Results routed via [address] [facility] to patient's PCP per surgeon's request.     Recent Results (from the past 12 hour(s))   CBC WITH AUTOMATED DIFF    Collection Time: 08/24/21  7:55 AM   Result Value Ref Range    WBC 9.2 4.3 - 11.1 K/uL    RBC 5.25 4.23 - 5.6 M/uL    HGB 15.9 13.6 - 17.2 g/dL    HCT 48.9 41.1 - 50.3 %    MCV 93.1 79.6 - 97.8 FL    MCH 30.3 26.1 - 32.9 PG    MCHC 32.5 31.4 - 35.0 g/dL    RDW 14.8 (H) 11.9 - 14.6 %    PLATELET 434 047 - 049 K/uL    MPV 12.1 9.4 - 12.3 FL    ABSOLUTE NRBC 0.00 0.0 - 0.2 K/uL    DF AUTOMATED      NEUTROPHILS 74 43 - 78 %    LYMPHOCYTES 18 13 - 44 %    MONOCYTES 6 4.0 - 12.0 %    EOSINOPHILS 3 0.5 - 7.8 %    BASOPHILS 0 0.0 - 2.0 %    IMMATURE GRANULOCYTES 0 0.0 - 5.0 %    ABS. NEUTROPHILS 6.7 1.7 - 8.2 K/UL    ABS. LYMPHOCYTES 1.6 0.5 - 4.6 K/UL    ABS. MONOCYTES 0.5 0.1 - 1.3 K/UL    ABS. EOSINOPHILS 0.2 0.0 - 0.8 K/UL    ABS. BASOPHILS 0.0 0.0 - 0.2 K/UL    ABS. IMM.  GRANS. 0.0 0.0 - 0.5 K/UL   HEMOGLOBIN A1C WITH EAG    Collection Time: 08/24/21  7:55 AM   Result Value Ref Range    Hemoglobin A1c 5.5 4.2 - 6.3 %    Est. average glucose 976 mg/dL   METABOLIC PANEL, BASIC    Collection Time: 08/24/21  7:55 AM   Result Value Ref Range    Sodium 136 136 - 145 mmol/L    Potassium 3.9 3.5 - 5.1 mmol/L    Chloride 104 98 - 107 mmol/L    CO2 32 21 - 32 mmol/L    Anion gap 0 (L) 7 - 16 mmol/L    Glucose 108 (H) 65 - 100 mg/dL    BUN 20 6 - 23 MG/DL    Creatinine 1.20 0.8 - 1.5 MG/DL    GFR est AA >60 >60 ml/min/1.73m2    GFR est non-AA >60 >60 ml/min/1.73m2    Calcium 8.7 8.3 - 10.4 MG/DL   PROTHROMBIN TIME + INR    Collection Time: 08/24/21  7:55 AM   Result Value Ref Range    Prothrombin time 13.3 12.6 - 14.5 sec    INR 1.0     PTT    Collection Time: 08/24/21  7:55 AM   Result Value Ref Range    aPTT 25.9 24.1 - 35.1 SEC   ALBUMIN    Collection Time: 08/24/21  7:55 AM   Result Value Ref Range    Albumin 3.7 3.5 - 5.0 g/dL

## 2021-08-24 NOTE — PERIOP NOTES
Patient verified name and . Order for consent found in EHR and matches case posting; patient verified. Type 3 surgery, joint assessment complete. Labs per surgeon: CBC,BMP, PT/PTT, Albumin, HgbA1c; results within anesthesia limits. Nicotine/Cotinine; results pending-charge nurse to f/u. T&S DOS and POC glucose DOS; orders signed and held in EHR. Labs per anesthesia protocol: no additional  EKG: Completed 21; results within anesthesia limits. Patient has CAD and hx of MI with 2 stents placed in . Patient is followed by Dr. Wesly Pierce. Patient has appointment with Dr. Wesly Pierce on 21 at [phone] for surgical clearance. Charge nurse to f/u. Cath (21), EKG (21), and Echo (20) located in EHR if needed. Patient reports hx of lumbar fusion. MRI lumb spine (10/22/20) located in EHR if needed. Patient has COPD and was previously followed by a pulmonologist. Pulmonary note dated 20 located in EHR under Care Everywhere. Pt states he is no longer has any inhalers and has never returned for scheduled pulmonary f/u because he wants to switch providers. Patient denies SOB. Lung sounds clear bilaterally. Chest X-ray report (21) states \"The cardiac silhouette is normal in respect to size. The lungs are expanded without evidence for pneumothorax. No consolidative airspace process or pleural effusion is seen. \"    Anesthesia to review the above and determine if patient needs to be seen by pulmonary prior to sx. Charge nurse to f/u. Patient COVID test date 21 at [id number]; Patient aware. The [facility] [place]  and telephone number [phone] provided to the patient if not appointment found. MRSA/MSSA swab collected; pharmacy to review and dose antibiotic as appropriate. Hospital approved surgical skin cleanser and instructions to return bottle on DOS given per hospital policy.     Patient provided with handouts including Guide to Surgery, Pain Management, Hand Hygiene, Blood Transfusion Education, and [facility] [facility] Brochure. Patient answered medical/surgical history questions at their best of ability. All prior to admission medications documented in [facility]. Original medication prescription bottle NOT visualized during patient appointment. Patient instructed to hold all vitamins, supplements, herbals 3 weeks prior to surgery and NSAIDS 5 days prior to surgery. Patient instructed to continue daily ASA due to hx of cardiac stents per anesthesia protocol. Patient teach back successful and patient demonstrates knowledge of instruction.

## 2021-08-24 NOTE — PROGRESS NOTES
21 0730   Oxygen Therapy   O2 Sat (%) 95 %   Pulse via Oximetry 66 beats per minute   O2 Device None (Room air)   Pre-Treatment   Breath Sounds Bilateral Clear   Pre FEV1 (liters) 3.2 liters   % Predicted 80     Initial respiratory Assessment completed with pt. Pt was interviewed and evaluated in Joint camp prior to surgery. Patient ID:  Rosa Cr  [id number]  89 y.o.  1965  Surgeon: Dr. Madi Nelson  Date of Surgery: 9/3/2021  Procedure:  Total Left Hip Arthroplasty  Primary Care Physician: Donaldo Crockett -[phone]  Specialists: [facility]    Pt taught proper COUGH technique  DIAPHRAGMATIC BREATHING EXERCISE INSTRUCTIONS GIVEN    History of smokin.5 PPD FOR 16  YEARS                 Quit date:         Secondhand smoke:PARENTS    Past procedures with Oxygen desaturation or delayed awakening:DENIES    Past Medical History:   Diagnosis Date    Anxiety     Back pain     CAD (coronary artery disease)     Followed by [facility], Dr. Ashok Gray, Nitroglycerin PRN-last used 2021    Chronic obstructive pulmonary disease ([facility]     Patient does not have any inhalers at this time, and would like to switch pulmonologist      Depression     GERD (gastroesophageal reflux disease)     managed with medication     H/O echocardiogram 2020    EF 60-65%    Hypercholesterolemia     managed with medication     Hypertension     managed with medication     Hypothyroidism     managed with medication     MI (myocardial infarction) ([facility]     2 stents placed at that time, daily 81 mg ASA, followed by Dr. Mary Hinson Nausea & vomiting     KIRAN on CPAP     Thyroid disease     COPD, CHRONIC BRONCHITIS  HX OF METHADONE DEPENDANCE  Respiratory history:DENIES SOB                                                                  Respiratory meds:  HAS NOT USED MDI IN 6 MONTHS SUPPOSED TO USE STIOLTO Q DAY AND PROAIR BID    FAMILY PRESENT:             NO     PAST SLEEP STUDY: YES                  IN [place] 1-2 YEARS AGO  HX OF KIRAN:                        YES                    KIRAN assessment:     POSITIVE FOR KIRAN- PT COMPLAINS HE HAS NOT BEEN ABLE TO GET NEW MASKS ETC.                                          SLEEPS ON SIDE        PHYSICAL EXAM   Body mass index is 33.63 kg/m². Visit Vitals  /67 (BP 1 Location: Left upper arm)   Pulse (!) 58   Temp 98.3 °F (36.8 °C)   Resp 18   Ht 6' (1.829 m)   Wt 112.5 kg (248 lb)   SpO2 97%   BMI 33.63 kg/m²     Neck circumference:  49.5    cm    Loud snoring:                                                 YES             Witnessed apnea or wakening gasping or choking:         APNEA  Awakens with headaches:                                               DENIES  Morning or daytime tiredness/ sleepiness:                           TIRED  Dry mouth or sore throat in morning:            YES                                            Pereyra stage:  4                                   SACS score:110  Stop Bang   STOP-BANG  Does the patient snore loudly (louder than talking or loud enough to be heard through closed doors)?: Yes  Does the patient often feel tired, fatigued, or sleepy during the daytime, even after a \"good\" night's sleep?: Yes  Has anyone ever observed the patient stop breathing during their sleep? : Yes  Does the patient have or are they being treated for high blood pressure?: Yes  Is the patient's BMI greater than 35?: No  Is your neck circumference greater than 17 inches (Male) or 16 inches (Female)?: Yes  Is the patient older than 48?: Yes  Is the patient male?: Yes  KIRAN Score: 7  Has the patient been referred to Sleep Medicine?: No  Has the patient previously been diagnosed with Obstructive Sleep Apnea?: Yes  Treated or Untreated?: Untreated                            PT NON-COMPLIANT with CPAP. Dangers of non-compliance with treatment of KIRAN explained to pt. KIRAN handouts given to pt. CONT. SAT MONITOR HS after surgery.   O2 @ 3 lpm NC HS or         ALBUTEROL Q6 PRN                                 Referrals:    Pt. Phone Number:

## 2021-08-24 NOTE — PROGRESS NOTES
Huma Pawel  : (5 y.o.) Joint Sarah Casey at [address], [address] [place] [address]  [phone]       Physical Therapy Prehab Plan of Treatment and Evaluation Summary:2021    ICD-10: Treatment Diagnosis:   · Pain in left hip (M25.552)  · Stiffness of Left Hip, Not elsewhere classified (W82.032)  Precautions/Allergies:   Patient has no known allergies. MEDICAL/REFERRING DIAGNOSIS:  Unilateral primary osteoarthritis, left hip [M16.12]  REFERRING PHYSICIAN: Ben Brooks MD  DATE OF SURGERY: 9/3/21    Assessment:   Comments:  He is here alone and is having a L DONNA. He will go home at [place] with the support of his spouse. He will DME at [place]. PROBLEM LIST (Impacting functional limitations):  Mr. Elke Cox presents with the following left lower extremity(s) problems:  1. Strength  2. Range of Motion  3. Home Exercise Program  4. Pain   INTERVENTIONS PLANNED:  1. Home Exercise Program  2. Educational Discussion      TREATMENT PLAN: Effective Dates: 2021 TO 2021. Frequency/Duration: Patient to continue to perform home exercise program at least twice per day up until his surgery. GOALS: (Goals have been discussed and agreed upon with patient.)  Discharge Goals: Time Frame: 1 Day  1. Patient will demonstrate independence with a home exercise program designed to increase strength, range of motion and pain control to minimize functional deficits and optimize patient for total joint replacement. Rehabilitation Potential For Stated Goals: Good  Regarding Mirtha Villagomez's therapy, I certify that the treatment plan above will be carried out by a therapist or under their direction.   Thank you for this referral,  Khushi Jaime PT               HISTORY:   Present Symptoms:  Pain Intensity 1: 8 (at its worst)  Pain Location 1: Hip  Pain Orientation 1: Anterior, Lateral, Left   History of Present Injury/Illness (Reason for Referral):  Medical/Referring Diagnosis: Unilateral primary osteoarthritis, left hip [M16.12]   Past Medical History/Comorbidities:   Mr. Grzegorz Freeman  has a past medical history of Anxiety, Back pain, CAD (coronary artery disease), [facility] Depression, GERD (gastroesophageal reflux disease), H/O echocardiogram (04/28/2020), Hypercholesterolemia, Hypertension, Hypothyroidism, MI (myocardial infarction) ([facility] (2008), KIRAN on CPAP, and Thyroid disease. Mr. Grzegorz Freeman  has a past surgical history that includes hx orthopaedic (1984); hx back surgery; hx ptca (2008); hx gi (05/2021); and hx heart catheterization (05/12/2021). 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  Heavy activity level  Dominant Side:  LEFT  Current Medications:  See Pre-assessment nursing note   Number of Personal Factors/Comorbidities that affect the Plan of Care: 1-2: MODERATE COMPLEXITY   EXAMINATION:   ADLs (Current Functional Status):   Ambulation:  [x] Independent  [] Walk Indoors Only  [x] Walk Outdoors  [] Use Assistive Device  [] Use Wheelchair Only Dressing:  [x] Independent  Requires Assistance from Someone for:  [] Sock/Shoes  [] Pants  [] Everything   Bathing/Showering:   [x] Independent  [] Requires Assistance from Someone  [] Sponge Bath Only Household Activities:  [] Routine house and yard work  [x] Light Housework Only  [] None   Observation/Orthostatic Postural Assessment:    Leg length discrepancy, right (R LE is 1/8\" shorter than L)  ROM/Flexibility:   AROM: Within functional limits (R LE)                LLE AROM  L Hip Flexion: 120  L Hip ABduction: 25          Strength:   Strength: Generally decreased, functional (R LE 4/5)       LLE Strength  L Hip Flexion: 3+  L Hip ABduction: 3+  L Knee Extension: 4  L Ankle Dorsiflexion: 4          Functional Mobility:    Sensation: Intact (R LE)    Stand to Sit: Independent  Sit to Stand: Independent  Stand Pivot Transfers: Independent  Distance (ft): 300 Feet (ft)  Ambulation - Level of Assistance: Independent  Speed/Charlette: Pace decreased (<100 feet/min)  Gait Abnormalities: Antalgic          Balance:    Sitting: Intact  Standing: Intact   Body Structures Involved:  1. Bones  2. Joints  3. Muscles Body Functions Affected:  1. Movement Related Activities and Participation Affected:  1. General Tasks and Demands  2. Mobility   Number of elements that affect the Plan of Care: 4+: HIGH COMPLEXITY   CLINICAL PRESENTATION:   Presentation: Stable and uncomplicated: LOW COMPLEXITY   CLINICAL DECISION MAKING:   Outcome Measure: Tool Used: Lower Extremity Functional Scale (LEFS)  Score:  Initial: 23/80 Most Recent: X/80 (Date: -- )   Interpretation of Score: 20 questions each scored on a 5 point scale with 0 representing \"extreme difficulty or unable to perform\" and 4 representing \"no difficulty\". The lower the score, the greater the functional disability. 80/80 represents no disability. Minimal detectable change is 9 points. Medical Necessity:   · Mr. Fox Boswell is expected to optimize his lower extremity strength and ROM in preparation for joint replacement surgery. Reason for Services/Other Comments:  · Achieve baseline assesment of musculoskeletal system, functional mobility and home environment. , educate in PT HEP in preparation for surgery, educate in hospital plan of care. Use of outcome tool(s) and clinical judgement create a POC that gives a: Clear prediction of patient's progress: LOW COMPLEXITY   TREATMENT:   Treatment/Session Assessment:  Patient was instructed in PT- HEP to increase strength and ROM in LEs. Answered all questions.   · Post session pain:  2  · Compliance with Program/Exercises: compliant most of the time.   Total Treatment Duration:  PT Patient Time In/Time Out  Time In: 0745  Time Out: 0800    Rex Tran PT

## 2021-08-25 LAB
COTININE UR QL SCN: NEGATIVE NG/ML
DRUG SCREEN COMMENT:, 753798: NORMAL

## 2021-08-26 NOTE — PERIOP NOTES
Ref Range & Units 8/24/21 0815 Resulting Agency   Cotinine Screen, urine Vonuwq=702 ng/mL Negative  [facility] OTS RTP   Drug Screen Comment:   Comment  [facility]   Comment: (NOTE)   This analysis is performed by immunoassay. Positive findings are   unconfirmed analytical test results; if results do not support   expected clinical finding, confirmation by an alternate methodology   is recommended. Patient metabolic variables, specific drug chemistry,   and specimen characteristics can affect test outcome. Technical consultation is available at [email or web address], or   call toll free [phone].    Performed At: [address] [id number]   Bhaskar Pennington MD MA:[id number]   Performed At: [address] [place] [id number]   Brittany Stevenson PhD EY:[id number]          Specimen Collected: 08/24/21 08:15 Last Resulted: 08/25/21 17:35

## 2021-09-02 ENCOUNTER — ANESTHESIA EVENT (OUTPATIENT)
Dept: SURGERY | Age: 56
DRG: 470 | End: 2021-09-02
Payer: COMMERCIAL

## 2021-09-03 ENCOUNTER — APPOINTMENT (OUTPATIENT)
Dept: GENERAL RADIOLOGY | Age: 56
DRG: 470 | End: 2021-09-03
Attending: ORTHOPAEDIC SURGERY
Payer: COMMERCIAL

## 2021-09-03 ENCOUNTER — ANESTHESIA (OUTPATIENT)
Dept: SURGERY | Age: 56
DRG: 470 | End: 2021-09-03
Payer: COMMERCIAL

## 2021-09-03 ENCOUNTER — HOME HEALTH ADMISSION (OUTPATIENT)
Dept: HOME HEALTH SERVICES | Facility: HOME HEALTH | Age: 56
End: 2021-09-03
Payer: COMMERCIAL

## 2021-09-03 ENCOUNTER — HOSPITAL ENCOUNTER (INPATIENT)
Age: 56
LOS: 1 days | Discharge: HOME HEALTH | DRG: 470 | End: 2021-09-04
Attending: ORTHOPAEDIC SURGERY | Admitting: ORTHOPAEDIC SURGERY
Payer: COMMERCIAL

## 2021-09-03 DIAGNOSIS — M16.12 PRIMARY OSTEOARTHRITIS OF LEFT HIP: Primary | ICD-10-CM

## 2021-09-03 PROBLEM — M16.11 OSTEOARTHRITIS OF RIGHT HIP: Status: ACTIVE | Noted: 2021-09-03

## 2021-09-03 LAB — HGB BLD-MCNC: 13.5 G/DL (ref 13.6–17.2)

## 2021-09-03 PROCEDURE — 97165 OT EVAL LOW COMPLEX 30 MIN: CPT

## 2021-09-03 PROCEDURE — 74011250636 HC RX REV CODE- 250/636: Performed by: NURSE PRACTITIONER

## 2021-09-03 PROCEDURE — 27130 TOTAL HIP ARTHROPLASTY: CPT | Performed by: NURSE PRACTITIONER

## 2021-09-03 PROCEDURE — 85018 HEMOGLOBIN: CPT

## 2021-09-03 PROCEDURE — C1776 JOINT DEVICE (IMPLANTABLE): HCPCS | Performed by: ORTHOPAEDIC SURGERY

## 2021-09-03 PROCEDURE — 77030002933 HC SUT MCRYL J&J -A: Performed by: ORTHOPAEDIC SURGERY

## 2021-09-03 PROCEDURE — 77030040922 HC BLNKT HYPOTHRM STRY -A: Performed by: ANESTHESIOLOGY

## 2021-09-03 PROCEDURE — 74011250636 HC RX REV CODE- 250/636: Performed by: NURSE ANESTHETIST, CERTIFIED REGISTERED

## 2021-09-03 PROCEDURE — 77030033067 HC SUT PDO STRATFX SPIR J&J -B: Performed by: ORTHOPAEDIC SURGERY

## 2021-09-03 PROCEDURE — 65270000029 HC RM PRIVATE

## 2021-09-03 PROCEDURE — 36415 COLL VENOUS BLD VENIPUNCTURE: CPT

## 2021-09-03 PROCEDURE — 97161 PT EVAL LOW COMPLEX 20 MIN: CPT

## 2021-09-03 PROCEDURE — 0SRB0JA REPLACEMENT OF LEFT HIP JOINT WITH SYNTHETIC SUBSTITUTE, UNCEMENTED, OPEN APPROACH: ICD-10-PCS | Performed by: ORTHOPAEDIC SURGERY

## 2021-09-03 PROCEDURE — 74011250636 HC RX REV CODE- 250/636: Performed by: ORTHOPAEDIC SURGERY

## 2021-09-03 PROCEDURE — 77030006835 HC BLD SAW SAG STRY -B: Performed by: ORTHOPAEDIC SURGERY

## 2021-09-03 PROCEDURE — 74011250636 HC RX REV CODE- 250/636: Performed by: ANESTHESIOLOGY

## 2021-09-03 PROCEDURE — 77030033138 HC SUT PGA STRATFX J&J -B: Performed by: ORTHOPAEDIC SURGERY

## 2021-09-03 PROCEDURE — 76060000035 HC ANESTHESIA 2 TO 2.5 HR: Performed by: ORTHOPAEDIC SURGERY

## 2021-09-03 PROCEDURE — 2709999900 HC NON-CHARGEABLE SUPPLY: Performed by: ORTHOPAEDIC SURGERY

## 2021-09-03 PROCEDURE — 77030034479 HC ADH SKN CLSR PRINEO J&J -B: Performed by: ORTHOPAEDIC SURGERY

## 2021-09-03 PROCEDURE — 74011250637 HC RX REV CODE- 250/637: Performed by: ANESTHESIOLOGY

## 2021-09-03 PROCEDURE — 77030019557 HC ELECTRD VES SEAL MEDT -F: Performed by: ORTHOPAEDIC SURGERY

## 2021-09-03 PROCEDURE — 74011250637 HC RX REV CODE- 250/637: Performed by: NURSE PRACTITIONER

## 2021-09-03 PROCEDURE — 74011250637 HC RX REV CODE- 250/637: Performed by: ORTHOPAEDIC SURGERY

## 2021-09-03 PROCEDURE — 72170 X-RAY EXAM OF PELVIS: CPT

## 2021-09-03 PROCEDURE — 74011000250 HC RX REV CODE- 250: Performed by: NURSE ANESTHETIST, CERTIFIED REGISTERED

## 2021-09-03 PROCEDURE — 97535 SELF CARE MNGMENT TRAINING: CPT

## 2021-09-03 PROCEDURE — 77030018673: Performed by: ORTHOPAEDIC SURGERY

## 2021-09-03 PROCEDURE — 94760 N-INVAS EAR/PLS OXIMETRY 1: CPT

## 2021-09-03 PROCEDURE — 94762 N-INVAS EAR/PLS OXIMTRY CONT: CPT

## 2021-09-03 PROCEDURE — 77030039425 HC BLD LARYNG TRULITE DISP TELE -A: Performed by: ANESTHESIOLOGY

## 2021-09-03 PROCEDURE — 76210000019 HC OR PH I REC 4 TO 4.5 HR: Performed by: ORTHOPAEDIC SURGERY

## 2021-09-03 PROCEDURE — 77030037088 HC TUBE ENDOTRACH ORAL NSL COVD-A: Performed by: ANESTHESIOLOGY

## 2021-09-03 PROCEDURE — 76010000171 HC OR TIME 2 TO 2.5 HR INTENSV-TIER 1: Performed by: ORTHOPAEDIC SURGERY

## 2021-09-03 PROCEDURE — C1713 ANCHOR/SCREW BN/BN,TIS/BN: HCPCS | Performed by: ORTHOPAEDIC SURGERY

## 2021-09-03 PROCEDURE — 27130 TOTAL HIP ARTHROPLASTY: CPT | Performed by: ORTHOPAEDIC SURGERY

## 2021-09-03 PROCEDURE — 2709999900 HC NON-CHARGEABLE SUPPLY

## 2021-09-03 PROCEDURE — 77030040361 HC SLV COMPR DVT MDII -B

## 2021-09-03 PROCEDURE — 97530 THERAPEUTIC ACTIVITIES: CPT

## 2021-09-03 PROCEDURE — 77030002922 HC SUT FBRWRE ARTH -B: Performed by: ORTHOPAEDIC SURGERY

## 2021-09-03 PROCEDURE — 74011000250 HC RX REV CODE- 250: Performed by: ANESTHESIOLOGY

## 2021-09-03 DEVICE — HIP H3 TOT ADV DUAL MOB -- IMPL CAPPED H3: Type: IMPLANTABLE DEVICE | Status: FUNCTIONAL

## 2021-09-03 DEVICE — LINER ACET SZ F ID46MM HIP X3 CO CHROM CEMENTLESS MOD 2: Type: IMPLANTABLE DEVICE | Site: HIP | Status: FUNCTIONAL

## 2021-09-03 DEVICE — SHELL ACET CLUS H 56F TRTANIUM -- TRIDENT II: Type: IMPLANTABLE DEVICE | Site: HIP | Status: FUNCTIONAL

## 2021-09-03 DEVICE — HEAD FEM DELT V40 0MM NK 28MM -- V40 BIOLOX: Type: IMPLANTABLE DEVICE | Site: HIP | Status: FUNCTIONAL

## 2021-09-03 DEVICE — INSERT ACET OD52MM ID28MM HIP X3 2 MOBILITY RESTR MOB BEAR: Type: IMPLANTABLE DEVICE | Site: HIP | Status: FUNCTIONAL

## 2021-09-03 DEVICE — STEM FEM SZ 5 L108MM NK L35MM 40MM OFFSET 132DEG HIP TI: Type: IMPLANTABLE DEVICE | Site: HIP | Status: FUNCTIONAL

## 2021-09-03 RX ORDER — ALBUTEROL SULFATE 0.83 MG/ML
2.5 SOLUTION RESPIRATORY (INHALATION)
Status: DISCONTINUED | OUTPATIENT
Start: 2021-09-03 | End: 2021-09-04 | Stop reason: HOSPADM

## 2021-09-03 RX ORDER — DIPHENHYDRAMINE HYDROCHLORIDE 50 MG/ML
12.5 INJECTION, SOLUTION INTRAMUSCULAR; INTRAVENOUS ONCE
Status: DISCONTINUED | OUTPATIENT
Start: 2021-09-03 | End: 2021-09-03 | Stop reason: HOSPADM

## 2021-09-03 RX ORDER — SODIUM CHLORIDE 0.9 % (FLUSH) 0.9 %
5-40 SYRINGE (ML) INJECTION AS NEEDED
Status: DISCONTINUED | OUTPATIENT
Start: 2021-09-03 | End: 2021-09-04 | Stop reason: HOSPADM

## 2021-09-03 RX ORDER — PANTOPRAZOLE SODIUM 40 MG/1
40 TABLET, DELAYED RELEASE ORAL
Status: DISCONTINUED | OUTPATIENT
Start: 2021-09-03 | End: 2021-09-04 | Stop reason: HOSPADM

## 2021-09-03 RX ORDER — CYCLOBENZAPRINE HCL 10 MG
10 TABLET ORAL
Status: DISCONTINUED | OUTPATIENT
Start: 2021-09-03 | End: 2021-09-04 | Stop reason: HOSPADM

## 2021-09-03 RX ORDER — NEOSTIGMINE METHYLSULFATE 1 MG/ML
INJECTION, SOLUTION INTRAVENOUS AS NEEDED
Status: DISCONTINUED | OUTPATIENT
Start: 2021-09-03 | End: 2021-09-03 | Stop reason: HOSPADM

## 2021-09-03 RX ORDER — BUPROPION HYDROCHLORIDE 150 MG/1
150 TABLET, EXTENDED RELEASE ORAL 2 TIMES DAILY
Status: DISCONTINUED | OUTPATIENT
Start: 2021-09-04 | End: 2021-09-04 | Stop reason: HOSPADM

## 2021-09-03 RX ORDER — NALOXONE HYDROCHLORIDE 0.4 MG/ML
0.1 INJECTION, SOLUTION INTRAMUSCULAR; INTRAVENOUS; SUBCUTANEOUS AS NEEDED
Status: DISCONTINUED | OUTPATIENT
Start: 2021-09-03 | End: 2021-09-03 | Stop reason: HOSPADM

## 2021-09-03 RX ORDER — SODIUM CHLORIDE, SODIUM LACTATE, POTASSIUM CHLORIDE, CALCIUM CHLORIDE 600; 310; 30; 20 MG/100ML; MG/100ML; MG/100ML; MG/100ML
100 INJECTION, SOLUTION INTRAVENOUS CONTINUOUS
Status: DISCONTINUED | OUTPATIENT
Start: 2021-09-03 | End: 2021-09-03 | Stop reason: HOSPADM

## 2021-09-03 RX ORDER — ONDANSETRON 8 MG/1
8 TABLET, ORALLY DISINTEGRATING ORAL
Status: DISCONTINUED | OUTPATIENT
Start: 2021-09-03 | End: 2021-09-04 | Stop reason: HOSPADM

## 2021-09-03 RX ORDER — ATORVASTATIN CALCIUM 40 MG/1
80 TABLET, FILM COATED ORAL
Status: DISCONTINUED | OUTPATIENT
Start: 2021-09-03 | End: 2021-09-04 | Stop reason: HOSPADM

## 2021-09-03 RX ORDER — PROPOFOL 10 MG/ML
INJECTION, EMULSION INTRAVENOUS AS NEEDED
Status: DISCONTINUED | OUTPATIENT
Start: 2021-09-03 | End: 2021-09-03 | Stop reason: HOSPADM

## 2021-09-03 RX ORDER — LISINOPRIL AND HYDROCHLOROTHIAZIDE 12.5; 2 MG/1; MG/1
2 TABLET ORAL DAILY
Status: DISCONTINUED | OUTPATIENT
Start: 2021-09-04 | End: 2021-09-04 | Stop reason: HOSPADM

## 2021-09-03 RX ORDER — MIDAZOLAM HYDROCHLORIDE 1 MG/ML
2 INJECTION, SOLUTION INTRAMUSCULAR; INTRAVENOUS ONCE
Status: COMPLETED | OUTPATIENT
Start: 2021-09-03 | End: 2021-09-03

## 2021-09-03 RX ORDER — ROCURONIUM BROMIDE 10 MG/ML
INJECTION, SOLUTION INTRAVENOUS AS NEEDED
Status: DISCONTINUED | OUTPATIENT
Start: 2021-09-03 | End: 2021-09-03 | Stop reason: HOSPADM

## 2021-09-03 RX ORDER — FENTANYL CITRATE 50 UG/ML
INJECTION, SOLUTION INTRAMUSCULAR; INTRAVENOUS AS NEEDED
Status: DISCONTINUED | OUTPATIENT
Start: 2021-09-03 | End: 2021-09-03 | Stop reason: HOSPADM

## 2021-09-03 RX ORDER — GABAPENTIN 100 MG/1
100 CAPSULE ORAL 2 TIMES DAILY
Status: DISCONTINUED | OUTPATIENT
Start: 2021-09-03 | End: 2021-09-04 | Stop reason: HOSPADM

## 2021-09-03 RX ORDER — SODIUM CHLORIDE 9 MG/ML
100 INJECTION, SOLUTION INTRAVENOUS CONTINUOUS
Status: DISCONTINUED | OUTPATIENT
Start: 2021-09-03 | End: 2021-09-04 | Stop reason: HOSPADM

## 2021-09-03 RX ORDER — SODIUM CHLORIDE 0.9 % (FLUSH) 0.9 %
5-40 SYRINGE (ML) INJECTION EVERY 8 HOURS
Status: DISCONTINUED | OUTPATIENT
Start: 2021-09-03 | End: 2021-09-04 | Stop reason: HOSPADM

## 2021-09-03 RX ORDER — ACETAMINOPHEN 500 MG
1000 TABLET ORAL EVERY 6 HOURS
Status: DISCONTINUED | OUTPATIENT
Start: 2021-09-03 | End: 2021-09-04 | Stop reason: HOSPADM

## 2021-09-03 RX ORDER — HYDROMORPHONE HYDROCHLORIDE 2 MG/1
2 TABLET ORAL
Status: DISCONTINUED | OUTPATIENT
Start: 2021-09-03 | End: 2021-09-04 | Stop reason: HOSPADM

## 2021-09-03 RX ORDER — METOPROLOL SUCCINATE 25 MG/1
25 TABLET, EXTENDED RELEASE ORAL DAILY
Status: DISCONTINUED | OUTPATIENT
Start: 2021-09-04 | End: 2021-09-04 | Stop reason: HOSPADM

## 2021-09-03 RX ORDER — ONDANSETRON 2 MG/ML
4 INJECTION INTRAMUSCULAR; INTRAVENOUS ONCE
Status: DISCONTINUED | OUTPATIENT
Start: 2021-09-03 | End: 2021-09-03 | Stop reason: HOSPADM

## 2021-09-03 RX ORDER — ONDANSETRON 8 MG/1
8 TABLET, ORALLY DISINTEGRATING ORAL
Qty: 30 TABLET | Refills: 0 | Status: SHIPPED | OUTPATIENT
Start: 2021-09-03 | End: 2021-12-09 | Stop reason: SDUPTHER

## 2021-09-03 RX ORDER — METHADONE HYDROCHLORIDE 10 MG/1
5 TABLET ORAL 2 TIMES DAILY
Status: DISCONTINUED | OUTPATIENT
Start: 2021-09-03 | End: 2021-09-04 | Stop reason: HOSPADM

## 2021-09-03 RX ORDER — TRANEXAMIC ACID 650 1/1
1300 TABLET ORAL 3 TIMES DAILY
Status: COMPLETED | OUTPATIENT
Start: 2021-09-03 | End: 2021-09-03

## 2021-09-03 RX ORDER — AMOXICILLIN 250 MG
1 CAPSULE ORAL DAILY
Qty: 30 TABLET | Refills: 0 | Status: SHIPPED | OUTPATIENT
Start: 2021-09-03 | End: 2022-01-10 | Stop reason: ALTCHOICE

## 2021-09-03 RX ORDER — CEFAZOLIN SODIUM/WATER 2 G/20 ML
2 SYRINGE (ML) INTRAVENOUS EVERY 8 HOURS
Status: COMPLETED | OUTPATIENT
Start: 2021-09-03 | End: 2021-09-04

## 2021-09-03 RX ORDER — OXYCODONE HYDROCHLORIDE 5 MG/1
10 TABLET ORAL
Status: COMPLETED | OUTPATIENT
Start: 2021-09-03 | End: 2021-09-03

## 2021-09-03 RX ORDER — ROPIVACAINE HYDROCHLORIDE 2 MG/ML
INJECTION, SOLUTION EPIDURAL; INFILTRATION; PERINEURAL AS NEEDED
Status: DISCONTINUED | OUTPATIENT
Start: 2021-09-03 | End: 2021-09-03 | Stop reason: HOSPADM

## 2021-09-03 RX ORDER — ACETAMINOPHEN 500 MG
1000 TABLET ORAL ONCE
Status: DISCONTINUED | OUTPATIENT
Start: 2021-09-03 | End: 2021-09-03 | Stop reason: HOSPADM

## 2021-09-03 RX ORDER — CEFAZOLIN SODIUM/WATER 2 G/20 ML
2 SYRINGE (ML) INTRAVENOUS ONCE
Status: COMPLETED | OUTPATIENT
Start: 2021-09-03 | End: 2021-09-03

## 2021-09-03 RX ORDER — HYDROMORPHONE HYDROCHLORIDE 2 MG/ML
0.5 INJECTION, SOLUTION INTRAMUSCULAR; INTRAVENOUS; SUBCUTANEOUS
Status: COMPLETED | OUTPATIENT
Start: 2021-09-03 | End: 2021-09-03

## 2021-09-03 RX ORDER — NALOXONE HYDROCHLORIDE 0.4 MG/ML
.2-.4 INJECTION, SOLUTION INTRAMUSCULAR; INTRAVENOUS; SUBCUTANEOUS
Status: DISCONTINUED | OUTPATIENT
Start: 2021-09-03 | End: 2021-09-04 | Stop reason: HOSPADM

## 2021-09-03 RX ORDER — GLYCOPYRROLATE 0.2 MG/ML
INJECTION INTRAMUSCULAR; INTRAVENOUS AS NEEDED
Status: DISCONTINUED | OUTPATIENT
Start: 2021-09-03 | End: 2021-09-03 | Stop reason: HOSPADM

## 2021-09-03 RX ORDER — DIPHENHYDRAMINE HCL 25 MG
25 CAPSULE ORAL
Status: DISCONTINUED | OUTPATIENT
Start: 2021-09-03 | End: 2021-09-04 | Stop reason: HOSPADM

## 2021-09-03 RX ORDER — NITROGLYCERIN 0.4 MG/1
0.4 TABLET SUBLINGUAL
Status: DISCONTINUED | OUTPATIENT
Start: 2021-09-03 | End: 2021-09-04 | Stop reason: HOSPADM

## 2021-09-03 RX ORDER — HYDROMORPHONE HYDROCHLORIDE 1 MG/ML
1 INJECTION, SOLUTION INTRAMUSCULAR; INTRAVENOUS; SUBCUTANEOUS
Status: DISCONTINUED | OUTPATIENT
Start: 2021-09-03 | End: 2021-09-04 | Stop reason: HOSPADM

## 2021-09-03 RX ORDER — TRANEXAMIC ACID 100 MG/ML
INJECTION, SOLUTION INTRAVENOUS AS NEEDED
Status: DISCONTINUED | OUTPATIENT
Start: 2021-09-03 | End: 2021-09-03 | Stop reason: HOSPADM

## 2021-09-03 RX ORDER — KETOROLAC TROMETHAMINE 30 MG/ML
INJECTION, SOLUTION INTRAMUSCULAR; INTRAVENOUS AS NEEDED
Status: DISCONTINUED | OUTPATIENT
Start: 2021-09-03 | End: 2021-09-03 | Stop reason: HOSPADM

## 2021-09-03 RX ORDER — GUAIFENESIN 100 MG/5ML
81 LIQUID (ML) ORAL
Status: COMPLETED | OUTPATIENT
Start: 2021-09-03 | End: 2021-09-03

## 2021-09-03 RX ORDER — ASPIRIN 81 MG/1
81 TABLET ORAL EVERY 12 HOURS
Status: DISCONTINUED | OUTPATIENT
Start: 2021-09-03 | End: 2021-09-04 | Stop reason: HOSPADM

## 2021-09-03 RX ORDER — EPHEDRINE SULFATE/0.9% NACL/PF 50 MG/5 ML
SYRINGE (ML) INTRAVENOUS AS NEEDED
Status: DISCONTINUED | OUTPATIENT
Start: 2021-09-03 | End: 2021-09-03 | Stop reason: HOSPADM

## 2021-09-03 RX ORDER — HYDROMORPHONE HYDROCHLORIDE 2 MG/1
4 TABLET ORAL
Qty: 60 TABLET | Refills: 0 | Status: SHIPPED | OUTPATIENT
Start: 2021-09-03 | End: 2021-09-09 | Stop reason: SDUPTHER

## 2021-09-03 RX ORDER — ALPRAZOLAM 0.5 MG/1
.5-1 TABLET ORAL
Status: DISCONTINUED | OUTPATIENT
Start: 2021-09-03 | End: 2021-09-04 | Stop reason: HOSPADM

## 2021-09-03 RX ORDER — AMOXICILLIN 250 MG
2 CAPSULE ORAL DAILY
Status: DISCONTINUED | OUTPATIENT
Start: 2021-09-04 | End: 2021-09-04 | Stop reason: HOSPADM

## 2021-09-03 RX ORDER — HYDROMORPHONE HYDROCHLORIDE 2 MG/ML
INJECTION, SOLUTION INTRAMUSCULAR; INTRAVENOUS; SUBCUTANEOUS AS NEEDED
Status: DISCONTINUED | OUTPATIENT
Start: 2021-09-03 | End: 2021-09-03 | Stop reason: HOSPADM

## 2021-09-03 RX ORDER — LIDOCAINE HYDROCHLORIDE 20 MG/ML
INJECTION, SOLUTION EPIDURAL; INFILTRATION; INTRACAUDAL; PERINEURAL AS NEEDED
Status: DISCONTINUED | OUTPATIENT
Start: 2021-09-03 | End: 2021-09-03 | Stop reason: HOSPADM

## 2021-09-03 RX ORDER — ONDANSETRON 4 MG/1
4 TABLET, ORALLY DISINTEGRATING ORAL
Status: DISCONTINUED | OUTPATIENT
Start: 2021-09-03 | End: 2021-09-04 | Stop reason: HOSPADM

## 2021-09-03 RX ORDER — ACETAMINOPHEN 500 MG
1000 TABLET ORAL
Qty: 60 TABLET | Refills: 0 | Status: SHIPPED | OUTPATIENT
Start: 2021-09-03

## 2021-09-03 RX ORDER — GABAPENTIN 300 MG/1
300 CAPSULE ORAL 2 TIMES DAILY
Qty: 30 CAPSULE | Refills: 0 | Status: SHIPPED | OUTPATIENT
Start: 2021-09-03 | End: 2021-10-07 | Stop reason: SDUPTHER

## 2021-09-03 RX ORDER — FENTANYL CITRATE 50 UG/ML
100 INJECTION, SOLUTION INTRAMUSCULAR; INTRAVENOUS ONCE
Status: DISCONTINUED | OUTPATIENT
Start: 2021-09-03 | End: 2021-09-03 | Stop reason: HOSPADM

## 2021-09-03 RX ORDER — MIDAZOLAM HYDROCHLORIDE 1 MG/ML
2 INJECTION, SOLUTION INTRAMUSCULAR; INTRAVENOUS
Status: DISCONTINUED | OUTPATIENT
Start: 2021-09-03 | End: 2021-09-03 | Stop reason: HOSPADM

## 2021-09-03 RX ORDER — SODIUM CHLORIDE, SODIUM LACTATE, POTASSIUM CHLORIDE, CALCIUM CHLORIDE 600; 310; 30; 20 MG/100ML; MG/100ML; MG/100ML; MG/100ML
75 INJECTION, SOLUTION INTRAVENOUS CONTINUOUS
Status: DISCONTINUED | OUTPATIENT
Start: 2021-09-03 | End: 2021-09-03 | Stop reason: HOSPADM

## 2021-09-03 RX ORDER — SODIUM CHLORIDE, SODIUM LACTATE, POTASSIUM CHLORIDE, CALCIUM CHLORIDE 600; 310; 30; 20 MG/100ML; MG/100ML; MG/100ML; MG/100ML
INJECTION, SOLUTION INTRAVENOUS
Status: DISCONTINUED | OUTPATIENT
Start: 2021-09-03 | End: 2021-09-03 | Stop reason: HOSPADM

## 2021-09-03 RX ORDER — LEVOTHYROXINE SODIUM 50 UG/1
50 TABLET ORAL
Status: DISCONTINUED | OUTPATIENT
Start: 2021-09-04 | End: 2021-09-04 | Stop reason: HOSPADM

## 2021-09-03 RX ORDER — ASPIRIN 81 MG/1
81 TABLET ORAL 2 TIMES DAILY
Qty: 60 TABLET | Refills: 0 | Status: SHIPPED | OUTPATIENT
Start: 2021-09-03

## 2021-09-03 RX ORDER — DEXAMETHASONE SODIUM PHOSPHATE 100 MG/10ML
10 INJECTION INTRAMUSCULAR; INTRAVENOUS ONCE
Status: DISCONTINUED | OUTPATIENT
Start: 2021-09-04 | End: 2021-09-04 | Stop reason: HOSPADM

## 2021-09-03 RX ORDER — OXYCODONE HYDROCHLORIDE 5 MG/1
5 TABLET ORAL
Status: DISCONTINUED | OUTPATIENT
Start: 2021-09-03 | End: 2021-09-03 | Stop reason: HOSPADM

## 2021-09-03 RX ORDER — LIDOCAINE HYDROCHLORIDE 10 MG/ML
0.1 INJECTION INFILTRATION; PERINEURAL AS NEEDED
Status: DISCONTINUED | OUTPATIENT
Start: 2021-09-03 | End: 2021-09-03 | Stop reason: HOSPADM

## 2021-09-03 RX ORDER — CYCLOBENZAPRINE HCL 10 MG
10 TABLET ORAL ONCE
Status: COMPLETED | OUTPATIENT
Start: 2021-09-03 | End: 2021-09-03

## 2021-09-03 RX ADMIN — HYDROMORPHONE HYDROCHLORIDE 0.5 MG: 2 INJECTION, SOLUTION INTRAMUSCULAR; INTRAVENOUS; SUBCUTANEOUS at 11:36

## 2021-09-03 RX ADMIN — CEFAZOLIN 2 G: 1 INJECTION, POWDER, FOR SOLUTION INTRAVENOUS at 08:01

## 2021-09-03 RX ADMIN — SODIUM CHLORIDE, SODIUM LACTATE, POTASSIUM CHLORIDE, AND CALCIUM CHLORIDE: 600; 310; 30; 20 INJECTION, SOLUTION INTRAVENOUS at 09:19

## 2021-09-03 RX ADMIN — ASPIRIN 81 MG: 81 TABLET, CHEWABLE ORAL at 06:52

## 2021-09-03 RX ADMIN — Medication 3 AMPULE: at 06:46

## 2021-09-03 RX ADMIN — Medication 10 MG: at 09:24

## 2021-09-03 RX ADMIN — GLYCOPYRROLATE 0.6 MG: 0.2 INJECTION, SOLUTION INTRAMUSCULAR; INTRAVENOUS at 09:47

## 2021-09-03 RX ADMIN — TRANEXAMIC ACID 1000 MG: 100 INJECTION, SOLUTION INTRAVENOUS at 09:22

## 2021-09-03 RX ADMIN — HYDROMORPHONE HYDROCHLORIDE 0.5 MG: 2 INJECTION, SOLUTION INTRAMUSCULAR; INTRAVENOUS; SUBCUTANEOUS at 10:08

## 2021-09-03 RX ADMIN — CYCLOBENZAPRINE 10 MG: 10 TABLET, FILM COATED ORAL at 10:58

## 2021-09-03 RX ADMIN — PANTOPRAZOLE SODIUM 40 MG: 40 TABLET, DELAYED RELEASE ORAL at 15:36

## 2021-09-03 RX ADMIN — SODIUM CHLORIDE, SODIUM LACTATE, POTASSIUM CHLORIDE, AND CALCIUM CHLORIDE: 600; 310; 30; 20 INJECTION, SOLUTION INTRAVENOUS at 08:00

## 2021-09-03 RX ADMIN — CEFAZOLIN SODIUM 2 G: 10 INJECTION, POWDER, FOR SOLUTION INTRAVENOUS at 16:00

## 2021-09-03 RX ADMIN — HYDROMORPHONE HYDROCHLORIDE 0.2 MG: 2 INJECTION INTRAMUSCULAR; INTRAVENOUS; SUBCUTANEOUS at 09:01

## 2021-09-03 RX ADMIN — Medication 81 MG: at 21:02

## 2021-09-03 RX ADMIN — Medication 1 SPRAY: at 15:38

## 2021-09-03 RX ADMIN — MIDAZOLAM 2 MG: 1 INJECTION INTRAMUSCULAR; INTRAVENOUS at 07:32

## 2021-09-03 RX ADMIN — HYDROMORPHONE HYDROCHLORIDE 0.5 MG: 2 INJECTION, SOLUTION INTRAMUSCULAR; INTRAVENOUS; SUBCUTANEOUS at 11:54

## 2021-09-03 RX ADMIN — OXYCODONE 10 MG: 5 TABLET ORAL at 12:09

## 2021-09-03 RX ADMIN — LIDOCAINE HYDROCHLORIDE 0.1 ML: 10 INJECTION, SOLUTION INFILTRATION; PERINEURAL at 07:02

## 2021-09-03 RX ADMIN — FENTANYL CITRATE 100 MCG: 50 INJECTION INTRAMUSCULAR; INTRAVENOUS at 08:09

## 2021-09-03 RX ADMIN — HYDROMORPHONE HYDROCHLORIDE 2 MG: 2 TABLET ORAL at 15:36

## 2021-09-03 RX ADMIN — HYDROMORPHONE HYDROCHLORIDE 0.4 MG: 2 INJECTION INTRAMUSCULAR; INTRAVENOUS; SUBCUTANEOUS at 08:58

## 2021-09-03 RX ADMIN — CYCLOBENZAPRINE 10 MG: 10 TABLET, FILM COATED ORAL at 21:02

## 2021-09-03 RX ADMIN — TRANEXAMIC ACID 1000 MG: 100 INJECTION, SOLUTION INTRAVENOUS at 08:12

## 2021-09-03 RX ADMIN — Medication 10 ML: at 22:00

## 2021-09-03 RX ADMIN — SODIUM CHLORIDE, SODIUM LACTATE, POTASSIUM CHLORIDE, AND CALCIUM CHLORIDE 100 ML/HR: 600; 310; 30; 20 INJECTION, SOLUTION INTRAVENOUS at 07:02

## 2021-09-03 RX ADMIN — HYDROMORPHONE HYDROCHLORIDE 0.5 MG: 2 INJECTION, SOLUTION INTRAMUSCULAR; INTRAVENOUS; SUBCUTANEOUS at 10:30

## 2021-09-03 RX ADMIN — HYDROMORPHONE HYDROCHLORIDE 0.5 MG: 2 INJECTION, SOLUTION INTRAMUSCULAR; INTRAVENOUS; SUBCUTANEOUS at 10:44

## 2021-09-03 RX ADMIN — Medication 10 MG: at 09:43

## 2021-09-03 RX ADMIN — TRANEXAMIC ACID 1300 MG: 650 TABLET ORAL at 15:36

## 2021-09-03 RX ADMIN — Medication 10 MG: at 08:19

## 2021-09-03 RX ADMIN — Medication 10 ML: at 15:39

## 2021-09-03 RX ADMIN — Medication 1 AMPULE: at 21:03

## 2021-09-03 RX ADMIN — ROCURONIUM BROMIDE 50 MG: 10 INJECTION, SOLUTION INTRAVENOUS at 08:09

## 2021-09-03 RX ADMIN — LIDOCAINE HYDROCHLORIDE 100 MG: 20 INJECTION, SOLUTION EPIDURAL; INFILTRATION; INTRACAUDAL; PERINEURAL at 08:09

## 2021-09-03 RX ADMIN — TRANEXAMIC ACID 1300 MG: 650 TABLET ORAL at 21:02

## 2021-09-03 RX ADMIN — ATORVASTATIN CALCIUM 80 MG: 40 TABLET, FILM COATED ORAL at 21:02

## 2021-09-03 RX ADMIN — Medication 10 MG: at 08:17

## 2021-09-03 RX ADMIN — ACETAMINOPHEN 1000 MG: 500 TABLET, FILM COATED ORAL at 17:37

## 2021-09-03 RX ADMIN — METHADONE HYDROCHLORIDE 5 MG: 10 TABLET ORAL at 17:38

## 2021-09-03 RX ADMIN — HYDROMORPHONE HYDROCHLORIDE 2 MG: 2 TABLET ORAL at 21:02

## 2021-09-03 RX ADMIN — HYDROMORPHONE HYDROCHLORIDE 0.5 MG: 2 INJECTION, SOLUTION INTRAMUSCULAR; INTRAVENOUS; SUBCUTANEOUS at 10:18

## 2021-09-03 RX ADMIN — Medication 10 MG: at 08:36

## 2021-09-03 RX ADMIN — Medication 10 MG: at 08:46

## 2021-09-03 RX ADMIN — PROPOFOL 200 MG: 10 INJECTION, EMULSION INTRAVENOUS at 08:09

## 2021-09-03 RX ADMIN — Medication 3 MG: at 09:47

## 2021-09-03 RX ADMIN — GABAPENTIN 100 MG: 100 CAPSULE ORAL at 17:37

## 2021-09-03 NOTE — PROGRESS NOTES
Care Management Interventions  Mode of Transport at Discharge: Self  Transition of Care Consult (CM Consult): [address]: Yes  Physical Therapy Consult: Yes  Current Support Network: Lives with Spouse  Confirm Follow Up Transport: Family  The Plan for Transition of Care is Related to the Following Treatment Goals : Return Home  Discharge Location  Discharge Placement: Home with home health    Patient is a 60y.o. year old male admitted for Left DONNA . Patient plans to return home on discharge. Order received to arrange home health. Patient without preference towards agency. Referral sent to [facility]. Patient needs a walker and bedside commode. Patient requesting we arrange a walker and bedside commode. Referral sent to [address] [place] [address]. delivered to the hospital room prior to discharge.  Will follow until discharge. SALLY Benedict

## 2021-09-03 NOTE — OP NOTES
Total Hip Procedure Note    Abraham Andrew,  [id number],  1965    Pre-operative Diagnosis: Primary osteoarthritis of left hip [M16.12]    Post-operative Diagnosis: Same    Procedure: Left Total Hip Arthroplasty    BMI: Body mass index is 33.61 kg/m². Poonam Rodriguez Location: [address] [place] [address]    Surgeon: Caden Nava MD    Assistant: Mayte Maria NP CFA and Peggy Hebert CFA    Anesthesia: Spinal     Complications: None    EBL: 200cc    Drains: None    Intra-op Findings: Pre-operatively leg lengths were assessed using preop Xrays and with the patient in the lateral decubitus position and operative leg was felt to be similar in length compared to the contralateral leg. The operative hip showed notable cartilage loss of both the femoral head and acetabulum. No intra-operative periprosthetic fracture was encountered. Sciatic nerve was noted to be intact at the end of the procedure. We measured the distance of our resected femoral head/neck from the cut surface to the center of the femoral head to be approximately 35mm. The overall length replaced with the implanted head/neck was 35mm. Intra-operatively we felt that we restored the patients leg lengths to equal lengths using the method described later. Postop with the patient supine we assessed leg lengths and felt they were similar. Patient condition at completion of Procedure: Stable    Implants:   Implant Name Type Inv. Item Serial No.  Lot No. LRB No. Used Action   PIN ORTH L229MM OD3. 6MM S STL SMOOTH SGL SHRP TIP TRCR PLN [[id number]] [facility]] - [id number]  PIN ORTH L229MM OD3. 6MM S STL SMOOTH SGL SHRP TIP TRCR PLN [[id number]] [employer]_ [id number] Left 2 Implanted and Explanted   SHELL ACET CLUS H 56F TRTANIUM -- TRIDENT II - [id number]  SHELL ACET CLUS H 56F TRTANIUM -- [employer] [facility] [id number] Left 1 Implanted   LINER MDM COCR 46MM F --  - [id number]  LINER MDM COCR 46MM F --   [employer] [facility]_ [id number] Left 1 Implanted   INSERT ACET CUP X3 [id number] -- RESTORATION ADM - [id number]  INSERT ACET CUP X3 [id number] -- RESTORATION ADM  [facility]_ [id number] Left 1 Implanted   STEM FEM SZ 5 132D [id number] -- ACCOLADE II V40 - [id number]  STEM FEM SZ 5 132D [id number] -- ACCOLADE II V40  [facility]_ [id number] Left 1 Implanted   HEAD FEM DELT V40 0MM NK 28MM -- V40 BIOLOX - [id number]  HEAD FEM DELT V40 0MM NK 28MM -- V40 BIOLOX  [facility]_ [id number] Left 1 Implanted       Description of Procedure    The complexity of the total joint surgery requires the use of a first assistant for positioning, retraction and assistance in closure. Abraham Andrew was brought to the operating room. Patient was transferred from the stretcher to OR bed. Anesthesia was induced. IV antibiotics were administered per protocol. Abraham Andrew was positioned in the lateral decubitus position and the pelvis/torso stabilized with posts. The left leg was prepped and draped in the usual sterile fashion. A time out identifying the patient, procedure, operative side and surgeon was administered and charted by the OR Nurse. Prior to incision one gram of TXA was given intravenously. A standard posterior approach was utilized to expose the left hip. The incision was carried through the subcutaneous tissue and underlying fascia with hemostasis obtained using the bovie cauterization and Aquamantys. A Charmley retractor was inserted. We resected any redundent bursal tissue off the external rotators. We were able to identify the piriformis tendon. The short external rotators and capsule were dissected off the posterior femur as a single layer just superior to the piriformis tendon. The sciatic nerve was palpated and protected during dissection. The femoral head was dislocated. The articular surface revealed loss of cartilage, exposed bone and bone spurring.  The neck was osteotomized approximately 1cm above the top of the lesser trochanter. We were careful to protect the greater trochanter during osteotomy and protect it from iatrogenic injury. Acetabular retractors were placed both anterior and posterior just superficial to the acetabular labrum. Remaining acetabular labrum was resected and any soft tissue was removed from the acetabulum including any tissue within the codyloid fossa. The acetabular surface revealed loss of cartilage with exposed bone. The acetabulum was sequentially reamed noting proper anteversion and inclination during reaming. The transverse acetabular ligament was used as the primary anatomic landmark to determine version and inclination. The acetabulum was sized to a 56 mm acetabular component. The prepared acetabulum was irrigated of any residual reamings and soft tissue. The permanent acetabular component was impacted into place to achieve appropriate horizontal tilt, anteversion, bone coverage and stability. We visualize that the acetabular component was fully seated. A trial liner was impacted into position. We did not have to excise overhanging osteophytes anterior and posterior in order to minimize the risk of impingement. We then turned our attention to the proximal femur. A 2-prong proximal femoral retractor was placed. We gained access to the proximal femur using a  and femoral canal finder. The canal was prepared with appropriate lateralization in which we used the initial smaller broaches to remove lateral bone to avoid varus placement of the femoral component. The canal was then broached progressively. The broach was positioned with the appropriate anatomic femoral anteversion. We broached up to a size 5 Accolade II stem. A calcar planar was used. A trial reduction with a size #5 132 degree Accolade II stem with a +0 neck length and 46mm MDM head was performed.   This was found to be the most stable with maximum hip flexion and with internal/external rotation testing. We did not appreciate any evidence of component or bony impingement. Limb lengths were assessed using three techniques. First, the position of the tip of the operative greater trochanter was compared to the center of the femoral head component and was felt to match this same anatomic relationship as the non-operative hip based on preoperative X-rays. Next, we measured the length of our resected femoral head neck and felt that the trial components matched this resected length as closely as possible when accounting for the length provided by the femoral neck/head. Finally, we compared leg lengths by comparing the possible of the patella with the patients heels together with the patient in the lateral decubitus position. Using these methods it was felt that the patients leg lengths were equilibrated and with appropriate stability as mentioned above. All trial components were removed. The final liner was impacted into place which was a MDM liner. A cementless size 5 132 degree Accolade II stem was impacted into place carefully. We were careful to observe the calcar region for any iatrogenic fractures during insertion. The permanent femoral head was impacted into place which was a +0mm 46mm MDM head. Alvechriss Araceli Hernando's hip was reduced and stability was as mentioned above. Dilute Betadine solution was allowed to sit in the surgical site for a 3 minute period and copious saline was used to irrigate the surgical site. The sciatic nerve was palpated and noted to be intact. A periarticular of ropivicaine, toradol, and morphine was injected about the surgical site with care being taken not to inject in the vicinity of neurovascular structures. Prior to wound closure one additional gram of TXA was given for a total of 2 grams. The capsule was repaired with a three #2 Fiberwires secured through drill holes placed in the posterior aspect of the trochanter.  No drain was placed. The fascia was closed with a #0 Bidirectional Stratafix barbed suture. The sub-Q layer was closed with 2-0 monocril suture and a  3-0 moncril stratafix suture in a running subcuticular fashion was used to close the skin. The incision site was thoroughly cleaned with alcohol and the Exofin wound closure system was applied to seal it off from external contamination after the overlying skin was thoroughly cleaned with alcohol. A thin layer of [place] lubricant was applied over the wound to keep the dressing from adhering to the overlying sterile bandage and said bandage was placed. Drapes were then broken down and patient was transferred carefully back to the OR stretcher. The sponge and needle counts were correct. The patient tolerated the procedure without difficulty and left the operating room in satisfactory condition.     Signed By: Linda Causey MD

## 2021-09-03 NOTE — PERIOP NOTES
Judith Meléndez spouse will be in car in hospital parking lot. Call with all updates [phone]. Patients belongings in locker room.

## 2021-09-03 NOTE — PROGRESS NOTES
Problem: Self Care Deficits Care Plan (Adult)  Goal: *Acute Goals and Plan of Care (Insert Text)  Outcome: Progressing Towards Goal  Note: GOALS:   DISCHARGE GOALS (in preparation for going home/rehab):  3 days  1. Mr. Maritza Paniagua will perform one lower body dressing activity with minimal assistance with adaptive equipment to demonstrate improved functional mobility and safety. 2.  Mr. Maritza Paniagua will perform one lower body bathing activity with minimal  assistance with adaptive equipment to demonstrate improved functional mobility and safety. 3.  Mr. Maritza Paniagua will perform toileting/toilet transfer with contact guard assistance with adaptive equipment to demonstrate improved functional mobility and safety. 4.  Mr. Maritza Paniagua will perform shower transfer with contact guard assistance with adaptive equipment to demonstrate improved functional mobility and safety. 5.  Mr. Maritza Paniagua will state DONNA precautions with two verbal cues to demonstrate improved functional mobility and safety. JOINT CAMP OCCUPATIONAL THERAPY DONNA: Initial Assessment, Daily Note, and PM 9/3/2021  INPATIENT: Hospital Day: 1  Payor: Gildardo Rodriguez / Plan: [employer] HMO/CHOICE PLUS/POS / Product Type: HMO /      NAME/AGE/GENDER: Thane Habermann is a 60 y.o. male   PRIMARY DIAGNOSIS:  Primary osteoarthritis of left hip [M16.12]   Procedure(s) and Anesthesia Type:     * HIP ARTHROPLASTY TOTAL/ LEFT/ ANTELMO - General (Left)  ICD-10: Treatment Diagnosis: L DONNA   Pain in left hip (M25.552)  Stiffness of Left Hip, Not elsewhere classified (J33.672)      ASSESSMENT:     Mr. Maritza Paniagua is s/p L DONNA and presents with decreased weight bearing on L LE and decreased independence with functional mobility and activities of daily living as compared to prior level of function and safety. Patient would benefit from skilled Occupational Therapy to maximize independence and safety with self-care task and functional mobility.   Pt would also benefit from education on lower body adaptive equipment and hip precautions post-surgery in preparation for going home. Patient plans for further rehab at home with home health services and good family support from spouse. OT reviewed therapy schedule and plan of care with patient. Patient was able to transfer and perform self care skills as charted below. Patient instructed to call for assistance when needing to get up from the recliner and all needs in reach. Patient verbalized understanding of call light. Should progress well with ADL session tomorrow. This section established at most recent assessment   PROBLEM LIST (Impairments causing functional limitations):  Decreased Strength  Decreased ADL/Functional Activities  Decreased Transfer Abilities  Increased Pain  Increased Fatigue  Decreased Flexibility/Joint Mobility  Decreased Knowledge of Precautions   INTERVENTIONS PLANNED: (Benefits and precautions of occupational therapy have been discussed with the patient.)  Activities of daily living training  Adaptive equipment training  Balance training  Clothing management  Donning&doffing training  Theraputic activity     TREATMENT PLAN: Frequency/Duration: Follow patient 1-2 sessions to address above goals. Rehabilitation Potential For Stated Goals: Good     RECOMMENDED REHABILITATION/EQUIPMENT: (at time of discharge pending progress): Continue Skilled Therapy. OCCUPATIONAL PROFILE AND HISTORY:   History of Present Injury/Illness (Reason for Referral): Pt presents this date s/p (L) DONNA. Past Medical History/Comorbidities:   Mr. Maritza Paniagua  has a past medical history of Anxiety, Back pain, CAD (coronary artery disease), Chronic obstructive pulmonary disease ([address] Depression, GERD (gastroesophageal reflux disease), H/O echocardiogram (04/28/2020), Hypercholesterolemia, Hypertension, Hypothyroidism, MI (myocardial infarction) ([address] (2008), Nausea & vomiting, KIRAN on CPAP, and Thyroid disease.   Mr. Maritza Paniagua  has a past surgical history that includes hx orthopaedic (1984); hx ptca (2008); hx gi (05/2021); hx heart catheterization (05/12/2021); and hx lumbar fusion. Social History/Living Environment:   Home Environment: Private residence  # Steps to Enter: 1  Rails to Enter: No  One/Two Story Residence: One story  Support Systems: Spouse/Significant Other/Partner  Patient Expects to be Discharged toVF Cor[de-identified]ration  Current DME Used/Available at Home: None  Tub or Shower Type: Tub/Shower combination    Prior Level of Function/Work/Activity:  Independent, lives with spouse     Number of Personal Factors/Comorbidities that affect the Plan of Care: Brief history (0):  LOW COMPLEXITY   ASSESSMENT OF OCCUPATIONAL PERFORMANCE[de-identified]   Most Recent Physical Functioning:   Balance  Sitting: Intact  Standing: Pull to stand; With support                    Coordination  Fine Motor Skills-Upper: Left Intact; Right Intact  Gross Motor Skills-Upper: Left Intact; Right Intact         Mental Status  Neurologic State: Alert  Orientation Level: Oriented X4  Cognition: Appropriate decision making  Perception: Appears intact  Perseveration: No perseveration noted  Safety/Judgement: Awareness of environment                Basic ADLs (From Assessment) Complex ADLs (From Assessment)   Basic ADL  Feeding: Independent  Oral Facial Hygiene/Grooming: Stand-by assistance  Bathing: Moderate assistance  Type of Bath: Chlorhexidine (CHG)  Upper Body Dressing: Setup  Lower Body Dressing:  Moderate assistance  Toileting: Minimum assistance     Grooming/Bathing/Dressing Activities of Daily Living     Cognitive Retraining  Safety/Judgement: Awareness of environment                 Functional Transfers  Bathroom Mobility: Minimum assistance  Toilet Transfer : Minimum assistance  Shower Transfer: Minimum assistance     Bed/Mat Mobility  Supine to Sit: Minimum assistance  Sit to Stand: Minimum assistance  Stand to Sit: Minimum assistance  Bed to Chair: Minimum assistance  Scooting: Contact guard assistance         Physical Skills Involved:  Range of Motion  Balance  Strength Cognitive Skills Affected (resulting in the inability to perform in a timely and safe manner):  [facility] Psychosocial Skills Affected:  Environmental Adaptation   Number of elements that affect the Plan of Care: 3-5:  MODERATE COMPLEXITY   CLINICAL DECISION MAKING:   [facility] AM-PAC 6 Clicks   Daily Activity Inpatient Short Form  How much help from another person does the patient currently need. .. Total A Lot A Little None   1. Putting on and taking off regular lower body clothing? [] 1   [x] 2   [] 3   [] 4   2. Bathing (including washing, rinsing, drying)? [] 1   [x] 2   [] 3   [] 4   3. Toileting, which includes using toilet, bedpan or urinal?   [] 1   [x] 2   [] 3   [] 4   4. Putting on and taking off regular upper body clothing? [] 1   [] 2   [] 3   [x] 4   5. Taking care of personal grooming such as brushing teeth? [] 1   [] 2   [] 3   [x] 4   6. Eating meals? [] 1   [] 2   [] 3   [x] 4   © 2007, Trustees of [facility], under license to [employer]. All rights reserved     Score:  Initial: 18 Most Recent: X (Date: -- )    Interpretation of Tool:  Represents activities that are increasingly more difficult (i.e. Bed mobility, Transfers, Gait). Medical Necessity:     Skilled intervention continues to be required due to the above deficits. Reason for Services/Other Comments:  Patient continues to require skilled intervention due to   New DONNA  .    Use of outcome tool(s) and clinical judgement create a POC that gives a: LOW COMPLEXITY            TREATMENT:   (In addition to Assessment/Re-Assessment sessions the following treatments were rendered)     Pre-treatment Symptoms/Complaints:    Pain: Initial:   Pain Intensity 1: 4  Pain Location 1: Hip  Pain Orientation 1: Left  Post Session:  4, ice in place     Self Care: (15): Procedure(s) (per grid) utilized to improve and/or restore self-care/home management as related to dressing and functional mobility . Required minimal verbal, manual, and tactile cueing to facilitate activities of daily living skills and compensatory activities. Evaluation complete    Treatment/Session Assessment:     Response to Treatment:  Good, up in chart. Education:  [] Home Exercises  [x] Fall Precautions  [x] Hip Precautions [] Going Home Video  [] Knee/Hip Prosthesis Review  [x] Walker Management/Safety [x] Adaptive Equipment as Needed       Interdisciplinary Collaboration:   Physical Therapist  Occupational Therapist  Registered Nurse    After treatment position/precautions:   Up in chair  Bed/Chair-wheels locked  Call light within reach  RN notified     Compliance with Program/Exercises: Will assess as treatment progresses. Recommendations/Intent for next treatment session:  Treatment next visit will focus on increasing Mr. Quang Calderón independence with bed mobility, transfers, self care, functional mobility, modalities for pain, and patient education.       Total Treatment Duration:  OT Patient Time In/Time Out  Time In: 4179  Time Out: [address] [place], [place]

## 2021-09-03 NOTE — PROGRESS NOTES
09/03/21 1611   Oxygen Therapy   O2 Sat (%) 94 %   Pulse via Oximetry 78 beats per minute   O2 Device None (Room air)   O2 Flow Rate (L/min) 0 l/min     Pt placed on continuous sat. Patient achieved 3500 Ml/sec on IS. Patient encouraged to do every hour while awake-patient agreed and demonstrated. No shortness of breath or distress noted. BS are clear b/l.    [facility] notes reviewed- continuous sat ordered HS

## 2021-09-03 NOTE — PROGRESS NOTES
PHYSICAL THERAPY JOINT CAMP DONNA: Initial Assessment, Treatment Day: Day of Assessment, and PM 9/3/2021  INPATIENT: Hospital Day: 1  Payor: Nola Snowball / Plan: [employer] HMO/CHOICE PLUS/POS / Product Type: HMO /      NAME/AGE/GENDER: Brian Palma is a 60 y.o. male   PRIMARY DIAGNOSIS:  Primary osteoarthritis of left hip [M16.12]   Procedure(s) and Anesthesia Type:     * HIP ARTHROPLASTY TOTAL/ LEFT/ ANTELMO - General (Left)  ICD-10: Treatment Diagnosis:    · Pain in left hip (M25.552)  · Stiffness of Left Hip, Not elsewhere classified (M25.652)  · Difficulty in walking, Not elsewhere classified (R26.2)      ASSESSMENT:     Mr. Suellen Bar presents with impaired strength & mobility s/p left DONNA. pt also had decreased stability during out of bed activity. This pt will benefit from follow up therapy to help restore safe function prior to returning home with caregiver. This section established at most recent assessment   PROBLEM LIST (Impairments causing functional limitations):  1. Decreased Strength  2. Decreased ADL/Functional Activities  3. Decreased Transfer Abilities  4. Decreased Ambulation Ability/Technique  5. Decreased Balance  6. Increased Pain  7. Decreased Activity Tolerance  8. Decreased Knowledge of Precautions  9. Decreased [place] with Home Exercise Program   INTERVENTIONS PLANNED: (Benefits and precautions of physical therapy have been discussed with the patient.)  1. Bed Mobility  2. Cold  3. Gait Training  4. Home Exercise Program (HEP)  5. Range of Motion (ROM)  6. Therapeutic Activites  7. Therapeutic Exercise/Strengthening  8. Transfer Training     TREATMENT PLAN: Frequency/Duration: Follow patient BID for duration of hospital stay to address above goals. Rehabilitation Potential For Stated Goals: Good     RECOMMENDED REHABILITATION/EQUIPMENT: (at time of discharge pending progress): Continue Skilled Therapy and Home Health: Physical Therapy.               HISTORY: History of Present Injury/Illness (Reason for Referral):  Left DONNA  Past Medical History/Comorbidities:   Mr. Marya Daily  has a past medical history of Anxiety, Back pain, CAD (coronary artery disease), Chronic obstructive pulmonary disease ([facility] [address] Depression, GERD (gastroesophageal reflux disease), H/O echocardiogram (04/28/2020), Hypercholesterolemia, Hypertension, Hypothyroidism, MI (myocardial infarction) ([facility] [address] (2008), Nausea & vomiting, KIRAN on CPAP, and Thyroid disease. Mr. Marya Daily  has a past surgical history that includes hx orthopaedic (1984); hx ptca (2008); hx gi (05/2021); hx heart catheterization (05/12/2021); and hx lumbar fusion. Social History/Living Environment:   Home Environment: Private residence  # Steps to Enter: 1  Rails to Enter: No  One/Two Story Residence: One story  Support Systems: Spouse/Significant Other/Partner  Patient Expects to be Discharged toVF Cor[de-identified]ration  Current DME Used/Available at Home: None  Tub or Shower Type: Tub/Shower combination    Prior Level of Function/Work/Activity:     Number of Personal Factors/Comorbidities that affect the Plan of Care: 3+: HIGH COMPLEXITY   EXAMINATION:   Most Recent Physical Functioning:   Gross Assessment: Yes  Gross Assessment  AROM: Within functional limits (right LE)  Strength: Within functional limits (right LE)  Coordination: Within functional limits (right LE)                     Bed Mobility  Supine to Sit: Contact guard assistance  Sit to Supine: Contact guard assistance  Scooting: Contact guard assistance    Transfers  Sit to Stand: Contact guard assistance  Stand to Sit: Contact guard assistance  Bed to Chair: Contact guard assistance (with walker)    Balance  Sitting: Intact; Without support  Standing: Impaired; With support (walker)              Weight Bearing Status  Left Side Weight Bearing: As tolerated  Distance (ft):  (an additional 200ft after an initial 50 ft gait assessment)  Ambulation - Level of Assistance: Contact guard assistance  Assistive Device: Walker, rolling  Speed/Charlette: Delayed  Step Length: Right shortened  Stance: Left decreased  Gait Abnormalities: Antalgic;Decreased step clearance        Braces/Orthotics: none    Left Hip Cold  Type: Cold/ice pack      Body Structures Involved:  1. Joints  2. Muscles Body Functions Affected:  1. Sensory/Pain  2. Movement Related Activities and Participation Affected:  1. General Tasks and Demands  2. Mobility   Number of elements that affect the Plan of Care: 4+: HIGH COMPLEXITY   CLINICAL PRESENTATION:   Presentation: Stable and uncomplicated: LOW COMPLEXITY   CLINICAL DECISION MAKIN[address] [place] [address] AM-PAC 6 Clicks   Basic Mobility Inpatient Short Form  How much difficulty does the patient currently have. .. Unable A Lot A Little None   1. Turning over in bed (including adjusting bedclothes, sheets and blankets)? [] 1   [] 2   [x] 3   [] 4   2. Sitting down on and standing up from a chair with arms ( e.g., wheelchair, bedside commode, etc.)   [] 1   [] 2   [x] 3   [] 4   3. Moving from lying on back to sitting on the side of the bed? [] 1   [] 2   [x] 3   [] 4   How much help from another person does the patient currently need. .. Total A Lot A Little None   4. Moving to and from a bed to a chair (including a wheelchair)? [] 1   [] 2   [x] 3   [] 4   5. Need to walk in hospital room? [] 1   [] 2   [x] 3   [] 4   6. Climbing 3-5 steps with a railing? [x] 1   [] 2   [] 3   [] 4   © , Trustees of [address] [place] [address], under license to [employer]. All rights reserved     Score:  Initial: 16 Most Recent: X (Date: -- )    Interpretation of Tool:  Represents activities that are increasingly more difficult (i.e. Bed mobility, Transfers, Gait).     Medical Necessity:     · Patient is expected to demonstrate progress in   · strength, balance, coordination, and functional technique  ·  to   · decrease assistance required with bed mobility, transfers & gait  · .  Reason for Services/Other Comments:  · Patient continues to require skilled intervention due to   · Pt not independent with functional mobility  · . Use of outcome tool(s) and clinical judgement create a POC that gives a: Clear prediction of patient's progress: LOW COMPLEXITY            TREATMENT:   (In addition to Assessment/Re-Assessment sessions the following treatments were rendered)     Pre-treatment Symptoms/Complaints:  none  Pain Initial: numeric scale  Pain Intensity 1: 3  Pain Location 1: Hip  Pain Orientation 1: Left  Pain Intervention(s) 1: Ambulation/Increased Activity, Cold pack  Post Session:  3/10     Therapeutic Activity: (    23 min (extra time to work through activity noted): Therapeutic activities including DONNA exercises, repeated bed mobility for practice, progressive gait training an additional 200 ft after an initial 65 ft gait assessment to improve mobility, strength, balance, coordination, and dynamic movement of leg - left to improve functional endurance & stability  . Assessment     Date:  9/3 Date:   Date:     ACTIVITY/EXERCISE AM PM AM PM AM PM   GROUP THERAPY  []  []  []  []  []  []   Ankle Pumps  20       Quad Sets  20       Gluteal Sets  20       Hip ABd/ADduction  20       Straight Leg Raises  --       Knee Slides  20       Short Arc Quads  20       Long Arc Quads  20       Chair Slides  --                B = bilateral; AA = active assistive; A = active; P = passive      Treatment/Session Assessment:     Response to Treatment:  tolerated well    Education:  [x] Home Exercises  [x] Fall Precautions  [x] Hip Precautions [] D/C Instruction Review  [] Hip Prosthesis Review  [x] Walker Management/Safety [] Adaptive Equipment as Needed       Interdisciplinary Collaboration:   o Registered Nurse    After treatment position/precautions:   o Up in chair  o Bed/Chair-wheels locked  o Call light within reach  o RN notified    Compliance with Program/Exercises:  Will assess as treatment progresses. Recommendations/Intent for next treatment session:  Treatment next visit will focus on increasing Mr. Feliciano Colindres independence with bed mobility, transfers, gait training, strength/ROM exercises, modalities for pain, and patient education.       Total Treatment Duration:  PT Patient Time In/Time Out  Time In: 1518  Time Out: HALLE Rosales

## 2021-09-03 NOTE — ANESTHESIA POSTPROCEDURE EVALUATION
Procedure(s):  HIP ARTHROPLASTY TOTAL/ LEFT/ ANTELMO. general    Anesthesia Post Evaluation      Multimodal analgesia: multimodal analgesia used between 6 hours prior to anesthesia start to PACU discharge  Patient location during evaluation: bedside  Patient participation: complete - patient participated  Level of consciousness: responsive to verbal stimuli  Pain management: adequate  Airway patency: patent  Anesthetic complications: no  Cardiovascular status: hemodynamically stable  Respiratory status: spontaneous ventilation  Hydration status: stable    Final Post Anesthesia Temperature Assessment:  Normothermia (36.0-37.5 degrees C)      INITIAL Post-op Vital signs:   Vitals Value Taken Time   /66 09/03/21 1025   Temp 36.8 °C (98.2 °F) 09/03/21 1005   Pulse 73 09/03/21 1028   Resp 16 09/03/21 1005   SpO2 100 % 09/03/21 1028   Vitals shown include unvalidated device data.

## 2021-09-03 NOTE — H&P
Patient ID:  Brit Estevez  [id number]  89 y.o.  1965    Today: September 3, 2021       CC:  Left hip pain    HPI:   The patient has end stage arthritis of the left hip. The patient was evaluated and examined in the office prior to today and was found to have a history and physical exam consistent with intra-articular pathology of the left hip. Patient complains of anterior groin pain predominately. Pain occurs during activity. Patient has difficulty putting on socks/shoes and has notable pain when getting up from a chair and getting out of a car. Patient does not complain of significant lateral hip pain or radicular pain down the leg. There have been no changes to the patient's orthopedic condition since the last office visit    Past Medical History:  Past Medical History:   Diagnosis Date    Anxiety     Back pain     CAD (coronary artery disease)     Followed by [facility], Dr. Navya Devine, Nitroglycerin PRN-last used 5/2021    Chronic obstructive pulmonary disease ([facility] [address]     Patient does not have any inhalers at this time, and would like to switch pulmonologist      Depression     GERD (gastroesophageal reflux disease)     managed with medication     H/O echocardiogram 04/28/2020    EF 60-65%    Hypercholesterolemia     managed with medication     Hypertension     managed with medication     Hypothyroidism     managed with medication     MI (myocardial infarction) ([address] 2008    2 stents placed at that time, daily 81 mg ASA, followed by Dr. Debbie Laura Nausea & vomiting     KIRAN on CPAP     Thyroid disease        Past Surgical History:  Past Surgical History:   Procedure Laterality Date    HX GI  05/2021    EGD     HX HEART CATHETERIZATION  05/12/2021    HX LUMBAR FUSION      HX ORTHOPAEDIC  1984    elbow infusion    HX PTCA  2008    stents x 2        Medications:     Prior to Admission medications    Medication Sig Start Date End Date Taking?  Authorizing Provider   cyclobenzaprine (FLEXERIL) 10 mg tablet Take 10 mg by mouth three (3) times daily as needed. 7/27/21  Yes Provider, Historical   diclofenac EC (VOLTAREN) 50 mg EC tablet Take 50 mg by mouth two (2) times a day. 7/14/21  Yes Provider, Historical   naproxen sodium (Aleve) 220 mg tablet Take 220 mg by mouth two (2) times daily (with meals). Yes Provider, Historical   ALPRAZolam (XANAX) 0.5 mg tablet Take 1-2 Tablets by mouth daily as needed for Anxiety. 6/28/21  Yes Tiffani Godoy MD   testosterone cypionate 200 mg/mL kit 200 mg by IntraMUSCular route every fourteen (14) days. Max Daily Amount: 200 mg. 6/23/21  Yes Tiffani Godoy MD   naphazoline-pheniramine [facility] A) 0.025-0.3 % ophthalmic solution Apply 1-2 Drops to eye four (4) times daily as needed (Pain and/or Redness). 6/23/21  Yes Tiffani Godoy MD   lisinopril-hydroCHLOROthiazide (PRINZIDE, ZESTORETIC) 20-12.5 mg per tablet Take 2 Tablets by mouth daily. 6/23/21  Yes Tiffani Godoy MD   levothyroxine (SYNTHROID) 50 mcg tablet Take 1 Tablet by mouth daily. 6/23/21  Yes Tiffani Godoy MD   buPROPion XL (WELLBUTRIN XL) 300 mg XL tablet Take 1 Tablet by mouth daily. 6/23/21  Yes Tiffani Godoy MD   atorvastatin (LIPITOR) 80 mg tablet Take 1 Tablet by mouth daily. 6/23/21  Yes Tiffani Godoy MD   pantoprazole (PROTONIX) 40 mg tablet Take 1 Tablet by mouth Before breakfast and dinner. 6/23/21  Yes Tiffani Godoy MD   metoprolol succinate (TOPROL-XL) 25 mg XL tablet Take 1 Tablet by mouth daily. 6/23/21  Yes Tiffani Godoy MD   methadone (DOLOPHINE) 5 mg tablet TAKE 1 TABLET BY MOUTH TWICE A DAY MUST LAST 30 DAYS 11/30/20  Yes Provider, Historical   aspirin delayed-release 81 mg tablet Take 81 mg by mouth daily. Yes Provider, Historical   sildenafil citrate (VIAGRA) 100 mg tablet Take 1 Tab by mouth daily as needed for Erectile Dysfunction. 9/15/20 9/15/21 Yes Tiffani Godoy MD   cpap machine kit by Does Not Apply route.   Patient not taking: Reported on 9/3/2021    Provider, Historical   DISABLED PLACARD (DISABLED PLACARD) DMV Supply prescription to [facility] with completed form RG-007A. Patient not taking: Reported on 9/3/2021 8/21/20   Provider, Historical   nitroglycerin (NITROSTAT) 0.4 mg SL tablet 0.4 mg by SubLINGual route as needed. Provider, Historical       Family History:     Family History   Problem Relation Age of Onset    Cancer Mother     Prostate Cancer Father     Heart Disease Father        Social History:      Social History     Tobacco Use    Smoking status: Former Smoker     Packs/day: 1.50     Years: 17.00     Pack years: 25.50     Quit date:      Years since quittin.6    Smokeless tobacco: Never Used   Substance Use Topics    Alcohol use: Never         Allergies:    No Known Allergies     Vitals:      Visit Vitals  /77 (BP 1 Location: Right upper arm, BP Patient Position: At rest)   Pulse 62   Temp 96.8 °F (36 °C)   Resp 18   Wt 247 lb 12.8 oz (112.4 kg)   SpO2 94%   BMI 33.61 kg/m²        Objective:     General: No Acute distress                  HEENT: Normocephalic/atramatic                  Lungs:  Breathing non-labored                  Heart:   RRR                   Abdomen: soft  Extremities:  Prior exam done in office has been consistent with end-stage hip arthritis. We have noted pain with ROM of the left hip which manifests as anterior groin   pain. There is decreased internal and external rotation of the affected hip. No significant pain with palpation over the greater trochanteric bursa. No radicular pain with    straight leg raise. Patient walks with and antalgic gait. Distally patient has no neurologic deficit.       Patient Active Problem List   Diagnosis Code    Hypotestosteronemia E34.9    Panic anxiety syndrome F41.0    COPD (chronic obstructive pulmonary disease) ([facility] [address] J44.9    ASCVD (arteriosclerotic cardiovascular disease) I25.10    HLD (hyperlipidemia) E78.5    HTN (hypertension) I10    Chronic back pain M54.9, G89.29    KIRAN (obstructive sleep apnea) G47.33    S/P lumbar spine operation Z98.890    Hypothyroidism (acquired) E03.9    GERD (gastroesophageal reflux disease) K21.9    ED (erectile dysfunction) N52.9    IGT (impaired glucose tolerance) R73.02    Allergic conjunctivitis H10.10    Methadone dependence (HCC) F11.20    Chronic prescription benzodiazepine use Z79.899    History of tobacco use Z87.891    OA (osteoarthritis) M19.90    Healthcare maintenance Z00.00    Primary osteoarthritis of left hip M16.12       Assessment:   1. Arthritis of the Left hip    Plan:   The patient has failed previous conservative treatment for this condition. The patient has pain in the left hip which causes daily symptoms which affects the patient's activities of daily living and quality of life. The risks, benefits, alternatives and possible complications of left total hip arthroplasty have been discussed with the patient including but not limited to infection, bleeding, damage to nerves and blood vessels with particular attention given to risk of damage of the femoral, obturator, lateral femoral cutaneous, superior gluteal, inferior gluteal, and sciatic nerve, risk of dislocation, fracture both intra-op and post-op, limb length inequality, polyethylene wear, implant loosening, risk for continued pain, and risk for revision surgery secondary to but not limited to all of these discussed risks. Further we discussed risk of venous thrombo-embolism including deep vein thrombosis and pulmonary embolism despite being on prophylaxis. We have also previously discussed the potential of morbidity and mortality associated with, but not limited to, the actual surgical procedure, anesthesia, prior medical conditions, and/or the administration of medications perioperatively. I have made no guarantees to the patient regarding outcomes and the patient has voiced understanding of that.  The patient has been given the opportunity to ask questions all of which have been answered and the patient wishes to proceed. The patient was counseled at length about the risks of sony Covid-19 during their perioperative period and any recovery window from their procedure. The patient was made aware that sony Covid-19  may worsen their prognosis for recovering from their procedure and lend to a higher morbidity and/or mortality risk. All material risks, benefits, and reasonable alternatives including postponing the procedure were discussed. The patient does  wish to proceed with the procedure at this time.         Signed By: Kristy Pinedo MD  September 3, 2021

## 2021-09-03 NOTE — PROGRESS NOTES
Patient arrived in room alert and oriented x4, oriented to room call light and menu. Pt states pain is manageable at this time and denies need for pain medication. Dressing to left hip clean dry and intact. No NV deficits noted.  IS at bedside

## 2021-09-03 NOTE — ANESTHESIA PREPROCEDURE EVALUATION
Relevant Problems   RESPIRATORY SYSTEM   (+) COPD (chronic obstructive pulmonary disease) (HCC)   (+) KIRAN (obstructive sleep apnea)      NEUROLOGY   (+) Panic anxiety syndrome      CARDIOVASCULAR   (+) HTN (hypertension)      GASTROINTESTINAL   (+) GERD (gastroesophageal reflux disease)      ENDOCRINE   (+) Hypothyroidism (acquired)       Anesthetic History   No history of anesthetic complications            Review of Systems / Medical History  Patient summary reviewed, nursing notes reviewed and pertinent labs reviewed    Pulmonary    COPD: mild    Sleep apnea: No treatment           Neuro/Psych         Psychiatric history     Cardiovascular    Hypertension          Past MI, CAD and cardiac stents (2005)    Exercise tolerance: >4 METS  Comments:   EF 50%    Stents in remote past. Recent workup for chest pain found to be noncardiac in nature   GI/Hepatic/Renal     GERD: well controlled           Endo/Other      Hypothyroidism: well controlled  Obesity and arthritis     Other Findings   Comments: Complicated Lumbar instrumentation and indwelling pain pump tubing         Physical Exam    Airway  Mallampati: II  TM Distance: 4 - 6 cm  Neck ROM: normal range of motion   Mouth opening: Normal     Cardiovascular  Regular rate and rhythm,  S1 and S2 normal,  no murmur, click, rub, or gallop             Dental    Dentition: Full lower dentures and Full upper dentures     Pulmonary  Breath sounds clear to auscultation               Abdominal         Other Findings            Anesthetic Plan    ASA: 3  Anesthesia type: general          Induction: Intravenous  Anesthetic plan and risks discussed with: Patient      Will perform GA given complicated lower back instrumentation and remnants of pain pump tubing

## 2021-09-03 NOTE — PROGRESS NOTES
TRANSFER - IN REPORT:    Verbal report received from [facility]) on Celestino Luong  being received from [facility]) for routine post - op      Report consisted of patients Situation, Background, Assessment and   Recommendations(SBAR). Information from the following report(s) SBAR, Kardex, OR Summary, Procedure Summary, Intake/Output and MAR was reviewed with the receiving nurse. Opportunity for questions and clarification was provided.

## 2021-09-03 NOTE — PERIOP NOTES
TRANSFER - OUT REPORT:    Verbal report given to Jase byrne RN (name) on Erin Mayer  being transferred to [facility](unit) for routine post - op       Report consisted of patients Situation, Background, Assessment and   Recommendations(SBAR). Information from the following report(s) SBAR, Kardex, OR Summary, Procedure Summary, Intake/Output and MAR was reviewed with the receiving nurse. Lines:   Peripheral IV 09/03/21 Left;Posterior Hand (Active)   Site Assessment Clean, dry, & intact 09/03/21 1105   Phlebitis Assessment 0 09/03/21 1105   Infiltration Assessment 0 09/03/21 1105   Dressing Status Clean, dry, & intact 09/03/21 1105   Dressing Type Transparent 09/03/21 1105   Hub Color/Line Status Pink; Infusing 09/03/21 1105        Opportunity for questions and clarification was provided.       Patient transported with:   O2 2L

## 2021-09-04 VITALS
DIASTOLIC BLOOD PRESSURE: 71 MMHG | WEIGHT: 247.8 LBS | TEMPERATURE: 97.9 F | RESPIRATION RATE: 18 BRPM | HEART RATE: 73 BPM | SYSTOLIC BLOOD PRESSURE: 119 MMHG | BODY MASS INDEX: 33.61 KG/M2 | OXYGEN SATURATION: 98 %

## 2021-09-04 LAB — HGB BLD-MCNC: 12.4 G/DL (ref 13.6–17.2)

## 2021-09-04 PROCEDURE — 36415 COLL VENOUS BLD VENIPUNCTURE: CPT

## 2021-09-04 PROCEDURE — 97530 THERAPEUTIC ACTIVITIES: CPT

## 2021-09-04 PROCEDURE — 94669 MECHANICAL CHEST WALL OSCILL: CPT

## 2021-09-04 PROCEDURE — 74011000250 HC RX REV CODE- 250: Performed by: NURSE PRACTITIONER

## 2021-09-04 PROCEDURE — 77030019905 HC CATH URETH INTMIT MDII -A

## 2021-09-04 PROCEDURE — 94760 N-INVAS EAR/PLS OXIMETRY 1: CPT

## 2021-09-04 PROCEDURE — 85018 HEMOGLOBIN: CPT

## 2021-09-04 PROCEDURE — 97535 SELF CARE MNGMENT TRAINING: CPT

## 2021-09-04 PROCEDURE — 74011250637 HC RX REV CODE- 250/637: Performed by: NURSE PRACTITIONER

## 2021-09-04 PROCEDURE — 74011250636 HC RX REV CODE- 250/636: Performed by: NURSE PRACTITIONER

## 2021-09-04 RX ADMIN — LISINOPRIL AND HYDROCHLOROTHIAZIDE 2 TABLET: 12.5; 2 TABLET ORAL at 09:50

## 2021-09-04 RX ADMIN — HYDROMORPHONE HYDROCHLORIDE 2 MG: 2 TABLET ORAL at 06:02

## 2021-09-04 RX ADMIN — LEVOTHYROXINE SODIUM 50 MCG: 0.05 TABLET ORAL at 06:02

## 2021-09-04 RX ADMIN — PANTOPRAZOLE SODIUM 40 MG: 40 TABLET, DELAYED RELEASE ORAL at 06:02

## 2021-09-04 RX ADMIN — CEFAZOLIN SODIUM 2 G: 10 INJECTION, POWDER, FOR SOLUTION INTRAVENOUS at 00:47

## 2021-09-04 RX ADMIN — HYDROMORPHONE HYDROCHLORIDE 2 MG: 2 TABLET ORAL at 14:31

## 2021-09-04 RX ADMIN — DOCUSATE SODIUM 50MG AND SENNOSIDES 8.6MG 2 TABLET: 8.6; 5 TABLET, FILM COATED ORAL at 09:50

## 2021-09-04 RX ADMIN — METOPROLOL SUCCINATE 25 MG: 25 TABLET, EXTENDED RELEASE ORAL at 09:50

## 2021-09-04 RX ADMIN — ACETAMINOPHEN 1000 MG: 500 TABLET, FILM COATED ORAL at 00:46

## 2021-09-04 RX ADMIN — ALPRAZOLAM 1 MG: 0.5 TABLET ORAL at 00:45

## 2021-09-04 RX ADMIN — ACETAMINOPHEN 1000 MG: 500 TABLET, FILM COATED ORAL at 06:02

## 2021-09-04 RX ADMIN — HYDROMORPHONE HYDROCHLORIDE 2 MG: 2 TABLET ORAL at 09:57

## 2021-09-04 RX ADMIN — DIPHENHYDRAMINE HYDROCHLORIDE 25 MG: 25 CAPSULE ORAL at 00:45

## 2021-09-04 RX ADMIN — BUPROPION HYDROCHLORIDE 150 MG: 150 TABLET, EXTENDED RELEASE ORAL at 09:50

## 2021-09-04 RX ADMIN — CYCLOBENZAPRINE 10 MG: 10 TABLET, FILM COATED ORAL at 09:57

## 2021-09-04 RX ADMIN — Medication 81 MG: at 09:50

## 2021-09-04 RX ADMIN — ACETAMINOPHEN 1000 MG: 500 TABLET, FILM COATED ORAL at 12:33

## 2021-09-04 RX ADMIN — GABAPENTIN 100 MG: 100 CAPSULE ORAL at 09:49

## 2021-09-04 RX ADMIN — METHADONE HYDROCHLORIDE 5 MG: 10 TABLET ORAL at 09:48

## 2021-09-04 RX ADMIN — Medication 1 AMPULE: at 09:53

## 2021-09-04 NOTE — PROGRESS NOTES
GOALS (1-4 days):  (1.)Mr. Ekta Garcia will move from supine to sit and sit to supine  in bed with MODIFIED INDEPENDENCE. Met 9/4  (2.)Mr. Ekta Garcia will transfer from bed to chair and chair to bed with SUPERVISION using the least restrictive device. Met 9/4   (3.)Mr. Ekta Garcia will ambulate with SUPERVISION for 400 feet with the least restrictive device. Met 9/4  (4.)Mr. Ekta Garcia will ambulate up/down 3 steps with no railing, simulating door frame for support with CONTACT GUARD ASSIST with device PRN. Met 9/4  (5.)Mr. Ekta Garcia will state/observe DONNA precautions with no verbal cues. Met 9/4  _____________________________________________________________________    PHYSICAL THERAPY JOINT CAMP DONNA: Daily Note, Treatment Day: 1st and AM 9/4/2021  INPATIENT: Hospital Day: 2  Payor: [employer] / Plan: [address] HMO/CHOICE PLUS/POS / Product Type: HMO /      NAME/AGE/GENDER: Vero Bragg is a 60 y.o. male   PRIMARY DIAGNOSIS:  Primary osteoarthritis of left hip [M16.12]   Procedure(s) and Anesthesia Type:     * HIP ARTHROPLASTY TOTAL/ LEFT/ ANTELMO - General (Left)  ICD-10: Treatment Diagnosis:    · Pain in left hip (M25.552)  · Stiffness of Left Hip, Not elsewhere classified (M25.652)  · Difficulty in walking, Not elsewhere classified (R26.2)      ASSESSMENT:     Mr. Ekta Garcia showed increased gait distance & improved level of assist for bed mobility, transfers & gait. This pt is ready for the next phase of his rehab program    This section established at most recent assessment   PROBLEM LIST (Impairments causing functional limitations):  1. Decreased Strength  2. Decreased ADL/Functional Activities  3. Decreased Transfer Abilities  4. Decreased Ambulation Ability/Technique  5. Decreased Balance  6. Increased Pain  7. Decreased Activity Tolerance  8. Decreased Knowledge of Precautions  9.  Decreased [place] with Home Exercise Program   INTERVENTIONS PLANNED: (Benefits and precautions of physical therapy have been discussed with the patient.)  1. Bed Mobility  2. Cold  3. Gait Training  4. Home Exercise Program (HEP)  5. Range of Motion (ROM)  6. Therapeutic Activites  7. Therapeutic Exercise/Strengthening  8. Transfer Training     TREATMENT PLAN: Frequency/Duration: Follow patient BID for duration of hospital stay to address above goals. Rehabilitation Potential For Stated Goals: Good     RECOMMENDED REHABILITATION/EQUIPMENT: (at time of discharge pending progress): Continue Skilled Therapy and Home Health: Physical Therapy. HISTORY:   History of Present Injury/Illness (Reason for Referral):  Left DONNA  Past Medical History/Comorbidities:   Mr. Beto Banda  has a past medical history of Anxiety, Back pain, CAD (coronary artery disease), Chronic obstructive pulmonary disease ([facility] [address] Depression, GERD (gastroesophageal reflux disease), H/O echocardiogram (04/28/2020), Hypercholesterolemia, Hypertension, Hypothyroidism, MI (myocardial infarction) ([facility] [address] (2008), Nausea & vomiting, KIRAN on CPAP, and Thyroid disease. Mr. Beto Banda  has a past surgical history that includes hx orthopaedic (1984); hx ptca (2008); hx gi (05/2021); hx heart catheterization (05/12/2021); and hx lumbar fusion.    Social History/Living Environment:   Home Environment: Private residence  # Steps to Enter: 1  Rails to Enter: No  One/Two Story Residence: One story  Living Alone: No  Support Systems: Spouse/Significant Other/Partner  Patient Expects to be Discharged to[de-identified] [employer]  Current DME Used/Available at Home: None  Tub or Shower Type: Tub/Shower combination    Prior Level of Function/Work/Activity:     Number of Personal Factors/Comorbidities that affect the Plan of Care: 3+: HIGH COMPLEXITY   EXAMINATION:   Most Recent Physical Functioning:                            Bed Mobility  Supine to Sit: Modified independent  Sit to Supine:  (NT)  Scooting: Independent    Transfers  Sit to Stand: Supervision  Stand to Sit: Supervision  Bed to Chair: Supervision (with walker)    Balance  Sitting: Intact; Without support  Standing: Intact; With support (walker)              Weight Bearing Status  Left Side Weight Bearing: As tolerated  Distance (ft): 425 Feet (ft)  Ambulation - Level of Assistance: Supervision  Assistive Device: Walker, rolling  Speed/Charlette: Delayed  Step Length: Right shortened  Stance: Left decreased  Gait Abnormalities: Antalgic;Decreased step clearance  Number of Stairs Trained: 3  Stairs - Level of Assistance: Contact guard assistance  Rail Use:  (none, simualte door frame for support)     Braces/Orthotics: none           Body Structures Involved:  1. Joints  2. Muscles Body Functions Affected:  1. Sensory/Pain  2. Movement Related Activities and Participation Affected:  1. General Tasks and Demands  2. Mobility   Number of elements that affect the Plan of Care: 4+: HIGH COMPLEXITY   CLINICAL PRESENTATION:   Presentation: Stable and uncomplicated: LOW COMPLEXITY   CLINICAL DECISION MAKING:   M [employer] AM-PAC 6 Clicks   Basic Mobility Inpatient Short Form  How much difficulty does the patient currently have. .. Unable A Lot A Little None   1. Turning over in bed (including adjusting bedclothes, sheets and blankets)? [] 1   [] 2   [x] 3   [] 4   2. Sitting down on and standing up from a chair with arms ( e.g., wheelchair, bedside commode, etc.)   [] 1   [] 2   [x] 3   [] 4   3. Moving from lying on back to sitting on the side of the bed? [] 1   [] 2   [x] 3   [] 4   How much help from another person does the patient currently need. .. Total A Lot A Little None   4. Moving to and from a bed to a chair (including a wheelchair)? [] 1   [] 2   [x] 3   [] 4   5. Need to walk in hospital room? [] 1   [] 2   [x] 3   [] 4   6. Climbing 3-5 steps with a railing? [x] 1   [] 2   [] 3   [] 4   © 2007, Trustees of [facility] [employer], under license to [employer].  All rights reserved     Score:  Initial: 16 Most Recent: X (Date: -- ) Interpretation of Tool:  Represents activities that are increasingly more difficult (i.e. Bed mobility, Transfers, Gait). Medical Necessity:     · Patient is expected to demonstrate progress in   · strength, balance, coordination, and functional technique  ·  to   · decrease assistance required with bed mobility, transfers & gait  · .  Reason for Services/Other Comments:  · Patient continues to require skilled intervention due to   · Pt not independent with functional mobility  · . Use of outcome tool(s) and clinical judgement create a POC that gives a: Clear prediction of patient's progress: LOW COMPLEXITY            TREATMENT:   (In addition to Assessment/Re-Assessment sessions the following treatments were rendered)     Pre-treatment Symptoms/Complaints:  Pt eager for DC  Pain Initial: numeric scale  Pain Intensity 1: 3  Pain Location 1: Hip  Pain Orientation 1: Left  Pain Intervention(s) 1: Ambulation/Increased Activity  Post Session:  3/10     Therapeutic Activity: (  23 Minutes (extra time to work through activity noted) ):  Therapeutic activities including DONNA exercises, bath room transfers, progressive gait training & stair training to improve mobility, strength, balance, coordination and dynamic movement of leg - left to improve functional endurance & stability.        Date:  9/3 Date:  9/4 Date:     ACTIVITY/EXERCISE AM PM AM PM AM PM   GROUP THERAPY  []  []  []  []  []  []   Ankle Pumps  20 20      Quad Sets  20 20      Gluteal Sets  20 20      Hip ABd/ADduction  20 20      Straight Leg Raises  -- --      Knee Slides  20 20      Short Arc Quads  20 20      Long Arc Quads  20 20      Chair Slides  -- --               B = bilateral; AA = active assistive; A = active; P = passive      Treatment/Session Assessment:     Response to Treatment:  Pt understanding  of PT exepectations    Education:  [x] Home Exercises  [x] Fall Precautions  [x] Hip Precautions [x] D/C Instruction Review  [] Hip Prosthesis Review  [x] Walker Management/Safety [] Adaptive Equipment as Needed       Interdisciplinary Collaboration:   o Registered Nurse    After treatment position/precautions:   o Up in chair  o Bed/Chair-wheels locked  o Call light within reach  o RN notified    Compliance with Program/Exercises: Will assess as treatment progresses. Recommendations/Intent for next treatment session:  Treatment next visit will focus on increasing Mr. Elli Okeefe independence with bed mobility, transfers, gait training, strength/ROM exercises, modalities for pain, and patient education.       Total Treatment Duration:  PT Patient Time In/Time Out  Time In: 1121  Time Out: Theron Wall [address] Anali Marshall, HALLE

## 2021-09-04 NOTE — PROGRESS NOTES
09/03/21 2115   Oxygen Therapy   O2 Sat (%) 92 %   Pulse via Oximetry 70 beats per minute   O2 Device None (Room air)   Patient placed on continuous sat monitor. Monitor history deleted prior to placing on patient. Patient started on PEP therapy. Michelle well.

## 2021-09-04 NOTE — PROGRESS NOTES
Orthopedic Joint Progress Note    2021  Admit Date: 9/3/2021  Admit Diagnosis: Primary osteoarthritis of left hip [M16.12]; Osteoarthritis of right hip [M16.11]; Osteoarthritis of left hip [M16.12]    1 Day Post-Op    Subjective:     Shelli Cea awake and alert    Review of Systems: Pertinent items are noted in HPI. Objective:     PT/OT:     PATIENT MOBILITY    Bed Mobility  Supine to Sit: Contact guard assistance  Sit to Supine: Contact guard assistance  Scooting: Contact guard assistance  Transfers  Sit to Stand: Contact guard assistance  Stand to Sit: Contact guard assistance  Bed to Chair: Contact guard assistance (with walker)      Gait  Speed/Charlette: Delayed  Step Length: Right shortened  Stance: Left decreased  Gait Abnormalities: Antalgic, Decreased step clearance  Ambulation - Level of Assistance: Contact guard assistance  Distance (ft):  (an additional 200ft after an initial 50 ft gait assessment)  Assistive Device: Walker, rolling   Weight Bearing Status  Left Side Weight Bearing: As tolerated        Vital Signs:    Blood pressure 119/68, pulse 84, temperature 98.2 °F (36.8 °C), resp. rate 18, weight 247 lb 12.8 oz (112.4 kg), SpO2 96 %.   Temp (24hrs), Av.4 °F (36.9 °C), Min:98.2 °F (36.8 °C), Max:98.5 °F (36.9 °C)      Pain Control:   Pain Assessment  Pain Scale 1: Numeric (0 - 10)  Pain Intensity 1: 6  Pain Location 1: Hip  Pain Orientation 1: Left  Pain Description 1: Aching  Pain Intervention(s) 1: Medication (see MAR)    Meds:  Current Facility-Administered Medications   Medication Dose Route Frequency    albuterol (PROVENTIL VENTOLIN) nebulizer solution 2.5 mg  2.5 mg Nebulization Q6H PRN    ALPRAZolam (XANAX) tablet 0.5-1 mg  0.5-1 mg Oral DAILY PRN    atorvastatin (LIPITOR) tablet 80 mg  80 mg Oral QHS    buPROPion SR (WELLBUTRIN SR) tablet 150 mg  150 mg Oral BID    cyclobenzaprine (FLEXERIL) tablet 10 mg  10 mg Oral TID PRN    levothyroxine (SYNTHROID) tablet 50 mcg 50 mcg Oral ACB    lisinopril-hydroCHLOROthiazide (PRINZIDE, ZESTORETIC) 20-12.5 mg per tablet 2 Tablet  2 Tablet Oral DAILY    methadone (DOLOPHINE) tablet 5 mg  5 mg Oral BID    metoprolol succinate (TOPROL-XL) XL tablet 25 mg  25 mg Oral DAILY    nitroglycerin (NITROSTAT) tablet 0.4 mg  0.4 mg SubLINGual Q5MIN PRN    pantoprazole (PROTONIX) tablet 40 mg  40 mg Oral ACB&D    alcohol 62% (NOZIN) nasal  1 Ampule  1 Ampule Topical Q12H    0.9% sodium chloride infusion  100 mL/hr IntraVENous CONTINUOUS    sodium chloride (NS) flush 5-40 mL  5-40 mL IntraVENous Q8H    sodium chloride (NS) flush 5-40 mL  5-40 mL IntraVENous PRN    acetaminophen (TYLENOL) tablet 1,000 mg  1,000 mg Oral Q6H    HYDROmorphone (DILAUDID) tablet 2 mg  2 mg Oral Q4H PRN    HYDROmorphone (DILAUDID) injection 1 mg  1 mg IntraVENous Q3H PRN    naloxone (NARCAN) injection 0.2-0.4 mg  0.2-0.4 mg IntraVENous Q10MIN PRN    dexamethasone (DECADRON) 10 mg/mL injection 10 mg  10 mg IntraVENous ONCE    ondansetron (ZOFRAN ODT) tablet 4 mg  4 mg Oral Q4H PRN    diphenhydrAMINE (BENADRYL) capsule 25 mg  25 mg Oral Q4H PRN    senna-docusate (PERICOLACE) 8.6-50 mg per tablet 2 Tablet  2 Tablet Oral DAILY    aspirin delayed-release tablet 81 mg  81 mg Oral Q12H    gabapentin (NEURONTIN) capsule 100 mg  100 mg Oral BID    ondansetron (ZOFRAN ODT) tablet 8 mg  8 mg Oral Q8H PRN    phenol throat spray (CHLORASEPTIC) 1 Spray  1 Spray Oral PRN        LAB:    Lab Results   Component Value Date/Time    INR 1.0 08/24/2021 07:55 AM     Lab Results   Component Value Date/Time    HGB 12.4 (L) 09/04/2021 04:17 AM    HGB 13.5 (L) 09/03/2021 06:44 PM    HGB 15.9 08/24/2021 07:55 AM       Incision 09/03/21 Hip Left (Active)   Dressing Status Dry; Intact;Breakthrough drainage noted 09/03/21 1913   Drainage Amount None 09/03/21 1913   Number of days: 1         Physical Exam:  Calves soft/ neuro intact      Assessment:      Active Problems: Osteoarthritis of right hip (9/3/2021)      Osteoarthritis of left hip (9/3/2021)         Plan:     Continue PT/OT/Rehab  Consult: Rehab team including PT, OT, recreational therapy, and     Patient Expects to be Discharged to[de-identified] House

## 2021-09-04 NOTE — PROGRESS NOTES
Care Management Interventions  Mode of Transport at Discharge: Self  Transition of Care Consult (CM Consult): [address]: Yes  Physical Therapy Consult: Yes  Current Support Network: Lives with Spouse  Confirm Follow Up Transport: Family  The Plan for Transition of Care is Related to the Following Treatment Goals : Return Home  Discharge Location  Discharge Placement: Home with home health

## 2021-09-04 NOTE — PROGRESS NOTES
Problem: Self Care Deficits Care Plan (Adult)  Goal: *Acute Goals and Plan of Care (Insert Text)  Outcome: Progressing Towards Goal  Note: GOALS:   DISCHARGE GOALS (in preparation for going home/rehab):  3 days  1. Mr. Brandee Maldonado will perform one lower body dressing activity with minimal assistance with adaptive equipment to demonstrate improved functional mobility and safety. GOAL MET 9/4/2021  2. Mr. Brandee Maldonado will perform one lower body bathing activity with minimal  assistance with adaptive equipment to demonstrate improved functional mobility and safety. GOAL MET 9/4/2021  3. Mr. Brandee Maldonado will perform toileting/toilet transfer with contact guard assistance with adaptive equipment to demonstrate improved functional mobility and safety. GOAL MET 9/4/2021  4. Mr. Brandee Maldonado will perform shower transfer with contact guard assistance with adaptive equipment to demonstrate improved functional mobility and safety. GOAL MET 9/4/2021  5. Mr. Brandee Maldonado will state DONNA precautions with two verbal cues to demonstrate improved functional mobility and safety. GOAL MET 9/4/2021       JOINT CAMP OCCUPATIONAL THERAPY DONNA: Daily Note, Discharge, Treatment Day: 2nd and AM 9/4/2021  INPATIENT: Hospital Day: 2  Payor: Dominic Ananda / Plan: [employer] HMO/CHOICE PLUS/POS / Product Type: HMO /      NAME/AGE/GENDER: Brit Estevez is a 60 y.o. male   PRIMARY DIAGNOSIS:  Primary osteoarthritis of left hip [M16.12]   Procedure(s) and Anesthesia Type:     * HIP ARTHROPLASTY TOTAL/ LEFT/ ANTELMO - General (Left)  ICD-10: Treatment Diagnosis: L DONNA   · Pain in left hip (M25.552)  · Stiffness of Left Hip, Not elsewhere classified (J94.373)      ASSESSMENT:      Mr. Brandee Maldonado is s/p L DONNA and presents with decreased weight bearing on L LE and decreased independence with functional mobility and activities of daily living.   Patient completed shower and dressing as charted below in ADL grid and is ambulating with rolling walker with supervision. Patient has met 5/5 goals and plans to return home with good family support. Family able to provide patient with appropriate level of assistance at this time. OT reviewed hip precautions throughout session. Patient instructed to call for assistance when needing to get up from recliner and all needs in reach. Patient verbalized understanding of call light. This section established at most recent assessment   PROBLEM LIST (Impairments causing functional limitations):  1. Decreased Strength  2. Decreased ADL/Functional Activities  3. Decreased Transfer Abilities  4. Increased Pain  5. Increased Fatigue  6. Decreased Flexibility/Joint Mobility  7. Decreased Knowledge of Precautions   INTERVENTIONS PLANNED: (Benefits and precautions of occupational therapy have been discussed with the patient.)  1. Activities of daily living training  2. Adaptive equipment training  3. Balance training  4. Clothing management  5. Donning&doffing training  6. Theraputic activity     TREATMENT PLAN: Frequency/Duration: Follow patient 1-2 sessions to address above goals. Rehabilitation Potential For Stated Goals: Good     RECOMMENDED REHABILITATION/EQUIPMENT: (at time of discharge pending progress): Continue Skilled Therapy. OCCUPATIONAL PROFILE AND HISTORY:   History of Present Injury/Illness (Reason for Referral): Pt presents this date s/p (L) DONNA. Past Medical History/Comorbidities:   Mr. Lorenzo Lopez  has a past medical history of Anxiety, Back pain, CAD (coronary artery disease), Chronic obstructive pulmonary disease ([address] Depression, GERD (gastroesophageal reflux disease), H/O echocardiogram (04/28/2020), Hypercholesterolemia, Hypertension, Hypothyroidism, MI (myocardial infarction) ([address] (2008), Nausea & vomiting, KIRAN on CPAP, and Thyroid disease.   Mr. Lorenzo Lopez  has a past surgical history that includes hx orthopaedic (1984); hx ptca (2008); hx gi (05/2021); hx heart catheterization (05/12/2021); and hx lumbar fusion. Social History/Living Environment:   Home Environment: Private residence  # Steps to Enter: 1  Rails to Enter: No  One/Two Story Residence: One story  Living Alone: No  Support Systems: Spouse/Significant Other/Partner  Patient Expects to be Discharged to[de-identified] [employer]  Current DME Used/Available at Home: None  Tub or Shower Type: Tub/Shower combination    Prior Level of Function/Work/Activity:  Independent, lives with spouse     Number of Personal Factors/Comorbidities that affect the Plan of Care: Brief history (0):  LOW COMPLEXITY   ASSESSMENT OF OCCUPATIONAL PERFORMANCE[de-identified]   Most Recent Physical Functioning:   Balance  Sitting: Intact  Standing: Intact                              Mental Status  Neurologic State: Alert  Orientation Level: Oriented X4  Cognition: Appropriate decision making; Appropriate for age attention/concentration  Perception: Appears intact  Perseveration: No perseveration noted  Safety/Judgement: Fall prevention                Basic ADLs (From Assessment) Complex ADLs (From Assessment)   Basic ADL  Feeding: Independent  Oral Facial Hygiene/Grooming: Stand-by assistance  Bathing: Moderate assistance  Type of Bath: Chlorhexidine (CHG), Full, Shower  Upper Body Dressing: Setup  Lower Body Dressing: Moderate assistance  Toileting: Minimum assistance     Grooming/Bathing/Dressing Activities of Daily Living   Grooming  Grooming Assistance: Independent  Washing Face: Independent Cognitive Retraining  Safety/Judgement: Fall prevention   Upper Body Bathing  Bathing Assistance: Modified independent  Position Performed: Seated in chair  Adaptive Equipment: Shower chair     Lower Body Bathing  Bathing Assistance: Modified independent  Perineal  : Independent  Lower Body : Modified independent  Adaptive Equipment: Long handled sponge; Shower chair Toileting  Bladder Hygiene: Independent   Upper Body Dressing Assistance  Dressing Assistance: Independent  Pullover Shirt: Independent Functional Transfers  Toilet Transfer : Modified independent  Shower Transfer: Modified independent   Lower Body Dressing Assistance  Dressing Assistance: Minimum assistance  Underpants: Minimum assistance  Pants With Elastic Waist: Minimum assistance  Socks: Minimum assistance  Slip on Shoes with Back: Minimum assistance  Adaptive Equipment Used: Reacher;Long handled shoe horn;Sock aid Bed/Mat Mobility  Supine to Sit: Independent  Sit to Supine: Independent  Sit to Stand: Independent  Stand to Sit: Independent         Physical Skills Involved:  1. Range of Motion  2. Balance  3. Strength Cognitive Skills Affected (resulting in the inability to perform in a timely and safe manner):  1. [facility] Psychosocial Skills Affected:  1. Environmental Adaptation   Number of elements that affect the Plan of Care: 3-5:  MODERATE COMPLEXITY   CLINICAL DECISION MAKIN[address] [place] [address] AM-PAC 6 Clicks   Daily Activity Inpatient Short Form  How much help from another person does the patient currently need. .. Total A Lot A Little None   1. Putting on and taking off regular lower body clothing? [] 1   [x] 2   [] 3   [] 4   2. Bathing (including washing, rinsing, drying)? [] 1   [x] 2   [] 3   [] 4   3. Toileting, which includes using toilet, bedpan or urinal?   [] 1   [x] 2   [] 3   [] 4   4. Putting on and taking off regular upper body clothing? [] 1   [] 2   [] 3   [x] 4   5. Taking care of personal grooming such as brushing teeth? [] 1   [] 2   [] 3   [x] 4   6. Eating meals? [] 1   [] 2   [] 3   [x] 4   © , Trustees of [address] [place] [address], under license to [employer]. All rights reserved     Score:  Initial: 18 Most Recent: X (Date: -- )    Interpretation of Tool:  Represents activities that are increasingly more difficult (i.e. Bed mobility, Transfers, Gait).        Use of outcome tool(s) and clinical judgement create a POC that gives a: LOW COMPLEXITY            TREATMENT:   (In addition to Assessment/Re-Assessment sessions the following treatments were rendered)     Pre-treatment Symptoms/Complaints:    Pain: Initial:   Pain Intensity 1: 0  Post Session:  4, ice in place     Self Care: (25): Procedure(s) (per grid) utilized to improve and/or restore self-care/home management as related to dressing, bathing, toileting, grooming and functional mobility. Required minimal verbal, manual, and tactile cueing to facilitate activities of daily living skills and compensatory activities. Treatment/Session Assessment:     Response to Treatment:  Good, up in chart. Education:  [] Home Exercises  [x] Fall Precautions  [x] Hip Precautions [] Going Home Video  [] Knee/Hip Prosthesis Review  [x] Walker Management/Safety [x] Adaptive Equipment as Needed       Interdisciplinary Collaboration:   o Physical Therapist  o Certified Occupational Therapy Assistant  o Registered Nurse    After treatment position/precautions:   o Up in chair  o Bed/Chair-wheels locked  o Call light within reach  o RN notified     Compliance with Program/Exercises: Will assess as treatment progresses. Pt doing well all goals met and will do well at home with support from family. Patient will be discharged home with home health PT. No further Occupational Therapy warranted, will discharge Occupational Therapy services.       Total Treatment Duration:  OT Patient Time In/Time Out  Time In: 9146  Time Out: [address] [place]

## 2021-09-04 NOTE — PROGRESS NOTES
Problem: Falls - Risk of  Goal: *Absence of Falls  Description: Document Edwina He Fall Risk and appropriate interventions in the flowsheet.   Outcome: Progressing Towards Goal  Note: Fall Risk Interventions:  Mobility Interventions: PT Consult for mobility concerns         Medication Interventions: Teach patient to arise slowly    Elimination Interventions: Call light in reach              Problem: Patient Education: Go to Patient Education Activity  Goal: Patient/Family Education  Outcome: Progressing Towards Goal     Problem: Patient Education: Go to Patient Education Activity  Goal: Patient/Family Education  Outcome: Progressing Towards Goal

## 2021-09-04 NOTE — DISCHARGE INSTRUCTIONS
IF YOU HAVE ANY PROBLEMS ONCE YOU ARE AT HOME CALL THE FOLLOWING NUMBERS:   Main office number: [phone]    Take Home Medications     · It is important that you take the medication exactly as they are prescribed. · Keep your medication in the bottles provided by the pharmacist and keep a list of the medication names, dosages, and times to be taken in your wallet. · Do not take other medications without consulting your doctor. What to do at [facility] [address] your prehospital diet. If you have excessive nausea or vomitting call your doctor's office     Home Physical Therapy is arranged. Use rolling walker when walking. Patients who have had a joint replacement should not drive until you are seen for your follow up appointment by Dr. Rob Gr. When to Call    - Call if you have a temperature greater then 101  - Unable to keep food down  - Loose control of your bladder or bowel function  - Are unable to bear any weight   - Need a pain medication refill     Information obtained by :  I understand that if any problems occur once I am at home I am to contact my physician. I understand and acknowledge receipt of the instructions indicated above.                                                                                                                                            Physician's or R.N.'s Signature                                                                  Date/Time                                                                                                                                              Patient or Representative Signature                                                          Date/Time        DISCHARGE SUMMARY from Nurse    PATIENT INSTRUCTIONS:    After general anesthesia or intravenous sedation, for 24 hours or while taking prescription Narcotics:  · Limit your activities  · Do not drive and operate hazardous machinery  · Do not make important personal or business decisions  · Do  not drink alcoholic beverages  · If you have not urinated within 8 hours after discharge, please contact your surgeon on call. Report the following to your surgeon:  · Excessive pain, swelling, redness or odor of or around the surgical area  · Nausea and vomiting lasting longer than 4 hours or if unable to take medications  · Any signs of decreased circulation or nerve impairment to extremity: change in color, persistent  numbness, tingling, coldness or increase pain  · Any questions    What to do at Home:  Recommended activity: PT/OT per Home Health,         *  Please give a list of your current medications to your Primary Care Provider. *  Please update this list whenever your medications are discontinued, doses are      changed, or new medications (including over-the-counter products) are added. *  Please carry medication information at all times in case of emergency situations. These are general instructions for a healthy lifestyle:    No smoking/ No tobacco products/ Avoid exposure to second hand smoke  Surgeon General's Warning:  Quitting smoking now greatly reduces serious risk to your health. Obesity, smoking, and sedentary lifestyle greatly increases your risk for illness    A healthy diet, regular physical exercise & weight monitoring are important for maintaining a healthy lifestyle    You may be retaining fluid if you have a history of heart failure or if you experience any of the following symptoms:  Weight gain of 3 pounds or more overnight or 5 pounds in a week, increased swelling in our hands or feet or shortness of breath while lying flat in bed. Please call your doctor as soon as you notice any of these symptoms; do not wait until your next office visit. The discharge information has been reviewed with the patient. The patient verbalized understanding.   Discharge medications reviewed with the patient and appropriate educational materials and side effects teaching were provided.   ___________________________________________________________________________________________________________________________________

## 2021-09-06 ENCOUNTER — HOME CARE VISIT (OUTPATIENT)
Dept: SCHEDULING | Facility: HOME HEALTH | Age: 56
End: 2021-09-06
Payer: COMMERCIAL

## 2021-09-06 VITALS
RESPIRATION RATE: 18 BRPM | DIASTOLIC BLOOD PRESSURE: 66 MMHG | SYSTOLIC BLOOD PRESSURE: 126 MMHG | OXYGEN SATURATION: 94 % | HEART RATE: 90 BPM | TEMPERATURE: 98.3 F

## 2021-09-06 PROCEDURE — 400013 HH SOC

## 2021-09-06 PROCEDURE — G0151 HHCP-SERV OF PT,EA 15 MIN: HCPCS

## 2021-09-08 ENCOUNTER — HOME CARE VISIT (OUTPATIENT)
Dept: SCHEDULING | Facility: HOME HEALTH | Age: 56
End: 2021-09-08
Payer: COMMERCIAL

## 2021-09-08 VITALS
RESPIRATION RATE: 16 BRPM | HEART RATE: 78 BPM | TEMPERATURE: 97.8 F | DIASTOLIC BLOOD PRESSURE: 68 MMHG | SYSTOLIC BLOOD PRESSURE: 118 MMHG

## 2021-09-08 PROCEDURE — G0157 HHC PT ASSISTANT EA 15: HCPCS

## 2021-09-10 ENCOUNTER — HOME CARE VISIT (OUTPATIENT)
Dept: SCHEDULING | Facility: HOME HEALTH | Age: 56
End: 2021-09-10
Payer: COMMERCIAL

## 2021-09-10 VITALS
TEMPERATURE: 98.4 F | HEART RATE: 78 BPM | RESPIRATION RATE: 16 BRPM | DIASTOLIC BLOOD PRESSURE: 84 MMHG | SYSTOLIC BLOOD PRESSURE: 132 MMHG

## 2021-09-10 PROCEDURE — G0157 HHC PT ASSISTANT EA 15: HCPCS

## 2021-09-14 ENCOUNTER — HOME CARE VISIT (OUTPATIENT)
Dept: SCHEDULING | Facility: HOME HEALTH | Age: 56
End: 2021-09-14
Payer: COMMERCIAL

## 2021-09-14 VITALS
RESPIRATION RATE: 17 BRPM | HEART RATE: 70 BPM | SYSTOLIC BLOOD PRESSURE: 110 MMHG | TEMPERATURE: 97.5 F | OXYGEN SATURATION: 98 % | DIASTOLIC BLOOD PRESSURE: 70 MMHG

## 2021-09-14 PROCEDURE — G0157 HHC PT ASSISTANT EA 15: HCPCS

## 2021-09-15 ENCOUNTER — HOME CARE VISIT (OUTPATIENT)
Dept: SCHEDULING | Facility: HOME HEALTH | Age: 56
End: 2021-09-15
Payer: COMMERCIAL

## 2021-09-15 VITALS
HEART RATE: 64 BPM | SYSTOLIC BLOOD PRESSURE: 96 MMHG | OXYGEN SATURATION: 95 % | DIASTOLIC BLOOD PRESSURE: 60 MMHG | TEMPERATURE: 97.8 F

## 2021-09-15 PROCEDURE — G0157 HHC PT ASSISTANT EA 15: HCPCS

## 2021-09-15 NOTE — CASE COMMUNICATION
Pt's BP was very low this visit. Pt's resting BP was 70/44. Pt insisted on walking, and following activity, it was measured at 96/60. Pt reports that he is taking medication for hypertension, and he has already taken his medication this morning. Due to the pt not reporting any real side effects, PTA contacted Dr Shaye Luna' office at 9:35am and left a message to inform, and PTA requested that Dr Shaye Luna or his assistant reach out to the pt to advise. Will continue to monitor in subsequent visits. Awaiting further necessary correspondence at this time.

## 2021-09-20 ENCOUNTER — HOME CARE VISIT (OUTPATIENT)
Dept: SCHEDULING | Facility: HOME HEALTH | Age: 56
End: 2021-09-20
Payer: COMMERCIAL

## 2021-09-20 VITALS
HEART RATE: 68 BPM | TEMPERATURE: 97.7 F | RESPIRATION RATE: 16 BRPM | SYSTOLIC BLOOD PRESSURE: 114 MMHG | DIASTOLIC BLOOD PRESSURE: 78 MMHG | OXYGEN SATURATION: 97 %

## 2021-09-20 PROCEDURE — G0157 HHC PT ASSISTANT EA 15: HCPCS

## 2021-09-20 NOTE — CASE COMMUNICATION
Pt reported that his physician, Dr. Edmond Mendoza, took him off the Metoprolol completely and this has stabilized his BP. Pt's vitals were within parameters today or therapy.

## 2021-09-24 ENCOUNTER — HOME CARE VISIT (OUTPATIENT)
Dept: SCHEDULING | Facility: HOME HEALTH | Age: 56
End: 2021-09-24
Payer: COMMERCIAL

## 2021-09-24 VITALS
SYSTOLIC BLOOD PRESSURE: 118 MMHG | TEMPERATURE: 98.4 F | OXYGEN SATURATION: 95 % | HEART RATE: 76 BPM | DIASTOLIC BLOOD PRESSURE: 78 MMHG

## 2021-09-24 PROCEDURE — G0151 HHCP-SERV OF PT,EA 15 MIN: HCPCS

## 2021-09-30 PROBLEM — M16.12 OSTEOARTHRITIS OF LEFT HIP: Status: RESOLVED | Noted: 2021-09-03 | Resolved: 2021-09-30

## 2021-09-30 PROBLEM — M16.11 OSTEOARTHRITIS OF RIGHT HIP: Status: RESOLVED | Noted: 2021-09-03 | Resolved: 2021-09-30

## 2021-09-30 PROBLEM — M16.12 PRIMARY OSTEOARTHRITIS OF LEFT HIP: Status: RESOLVED | Noted: 2021-07-06 | Resolved: 2021-09-30

## 2021-11-21 ENCOUNTER — APPOINTMENT (OUTPATIENT)
Dept: GENERAL RADIOLOGY | Age: 56
End: 2021-11-21
Attending: EMERGENCY MEDICINE
Payer: COMMERCIAL

## 2021-11-21 ENCOUNTER — HOSPITAL ENCOUNTER (EMERGENCY)
Age: 56
Discharge: HOME | End: 2021-11-21
Attending: EMERGENCY MEDICINE
Payer: COMMERCIAL

## 2021-11-21 VITALS
HEIGHT: 72 IN | TEMPERATURE: 98.3 F | DIASTOLIC BLOOD PRESSURE: 70 MMHG | WEIGHT: 240 LBS | SYSTOLIC BLOOD PRESSURE: 125 MMHG | OXYGEN SATURATION: 95 % | BODY MASS INDEX: 32.51 KG/M2 | HEART RATE: 77 BPM | RESPIRATION RATE: 28 BRPM

## 2021-11-21 DIAGNOSIS — F41.0 PANIC ANXIETY SYNDROME: ICD-10-CM

## 2021-11-21 DIAGNOSIS — R06.02 SOB (SHORTNESS OF BREATH): Primary | ICD-10-CM

## 2021-11-21 DIAGNOSIS — G47.30 SLEEP APNEA, UNSPECIFIED TYPE: ICD-10-CM

## 2021-11-21 DIAGNOSIS — R07.9 ACUTE CHEST PAIN: ICD-10-CM

## 2021-11-21 LAB
ALBUMIN SERPL-MCNC: 3.2 G/DL (ref 3.5–5)
ALBUMIN/GLOB SERPL: 0.9 {RATIO} (ref 1.2–3.5)
ALP SERPL-CCNC: 75 U/L (ref 50–136)
ALT SERPL-CCNC: 28 U/L (ref 12–65)
ANION GAP SERPL CALC-SCNC: 2 MMOL/L (ref 7–16)
AST SERPL-CCNC: 11 U/L (ref 15–37)
ATRIAL RATE: 75 BPM
BASOPHILS # BLD: 0 K/UL (ref 0–0.2)
BASOPHILS NFR BLD: 0 % (ref 0–2)
BILIRUB SERPL-MCNC: 0.5 MG/DL (ref 0.2–1.1)
BNP SERPL-MCNC: 54 PG/ML (ref 5–125)
BUN SERPL-MCNC: 12 MG/DL (ref 6–23)
CALCIUM SERPL-MCNC: 8.2 MG/DL (ref 8.3–10.4)
CALCULATED P AXIS, ECG09: 51 DEGREES
CALCULATED R AXIS, ECG10: 62 DEGREES
CALCULATED T AXIS, ECG11: 72 DEGREES
CHLORIDE SERPL-SCNC: 103 MMOL/L (ref 98–107)
CO2 SERPL-SCNC: 33 MMOL/L (ref 21–32)
CREAT SERPL-MCNC: 1 MG/DL (ref 0.8–1.5)
DIAGNOSIS, 93000: NORMAL
DIFFERENTIAL METHOD BLD: NORMAL
EOSINOPHIL # BLD: 0.2 K/UL (ref 0–0.8)
EOSINOPHIL NFR BLD: 2 % (ref 0.5–7.8)
ERYTHROCYTE [DISTWIDTH] IN BLOOD BY AUTOMATED COUNT: 13.7 % (ref 11.9–14.6)
GLOBULIN SER CALC-MCNC: 3.5 G/DL (ref 2.3–3.5)
GLUCOSE SERPL-MCNC: 102 MG/DL (ref 65–100)
HCT VFR BLD AUTO: 45.2 % (ref 41.1–50.3)
HGB BLD-MCNC: 14.4 G/DL (ref 13.6–17.2)
IMM GRANULOCYTES # BLD AUTO: 0 K/UL (ref 0–0.5)
IMM GRANULOCYTES NFR BLD AUTO: 0 % (ref 0–5)
LYMPHOCYTES # BLD: 1.2 K/UL (ref 0.5–4.6)
LYMPHOCYTES NFR BLD: 17 % (ref 13–44)
MCH RBC QN AUTO: 28.1 PG (ref 26.1–32.9)
MCHC RBC AUTO-ENTMCNC: 31.9 G/DL (ref 31.4–35)
MCV RBC AUTO: 88.3 FL (ref 79.6–97.8)
MONOCYTES # BLD: 0.5 K/UL (ref 0.1–1.3)
MONOCYTES NFR BLD: 7 % (ref 4–12)
NEUTS SEG # BLD: 5.1 K/UL (ref 1.7–8.2)
NEUTS SEG NFR BLD: 73 % (ref 43–78)
NRBC # BLD: 0 K/UL (ref 0–0.2)
P-R INTERVAL, ECG05: 170 MS
PLATELET # BLD AUTO: 186 K/UL (ref 150–450)
PMV BLD AUTO: 11 FL (ref 9.4–12.3)
POTASSIUM SERPL-SCNC: 3.7 MMOL/L (ref 3.5–5.1)
PROT SERPL-MCNC: 6.7 G/DL (ref 6.3–8.2)
Q-T INTERVAL, ECG07: 360 MS
QRS DURATION, ECG06: 94 MS
QTC CALCULATION (BEZET), ECG08: 402 MS
RBC # BLD AUTO: 5.12 M/UL (ref 4.23–5.6)
SODIUM SERPL-SCNC: 138 MMOL/L (ref 136–145)
TROPONIN-HIGH SENSITIVITY: 11 PG/ML (ref 0–14)
VENTRICULAR RATE, ECG03: 75 BPM
WBC # BLD AUTO: 6.9 K/UL (ref 4.3–11.1)

## 2021-11-21 PROCEDURE — 85025 COMPLETE CBC W/AUTO DIFF WBC: CPT

## 2021-11-21 PROCEDURE — 74011250636 HC RX REV CODE- 250/636: Performed by: EMERGENCY MEDICINE

## 2021-11-21 PROCEDURE — 80053 COMPREHEN METABOLIC PANEL: CPT

## 2021-11-21 PROCEDURE — 71046 X-RAY EXAM CHEST 2 VIEWS: CPT

## 2021-11-21 PROCEDURE — 83880 ASSAY OF NATRIURETIC PEPTIDE: CPT

## 2021-11-21 PROCEDURE — 99284 EMERGENCY DEPT VISIT MOD MDM: CPT

## 2021-11-21 PROCEDURE — 96374 THER/PROPH/DIAG INJ IV PUSH: CPT

## 2021-11-21 PROCEDURE — 74011250637 HC RX REV CODE- 250/637: Performed by: EMERGENCY MEDICINE

## 2021-11-21 PROCEDURE — 84484 ASSAY OF TROPONIN QUANT: CPT

## 2021-11-21 PROCEDURE — 93005 ELECTROCARDIOGRAM TRACING: CPT | Performed by: EMERGENCY MEDICINE

## 2021-11-21 RX ORDER — ALPRAZOLAM 0.5 MG/1
1 TABLET ORAL
Status: COMPLETED | OUTPATIENT
Start: 2021-11-21 | End: 2021-11-21

## 2021-11-21 RX ORDER — ALPRAZOLAM 0.5 MG/1
.5-1 TABLET ORAL
Qty: 6 TABLET | Refills: 2 | Status: SHIPPED | OUTPATIENT
Start: 2021-11-21 | End: 2022-01-04 | Stop reason: SDUPTHER

## 2021-11-21 RX ORDER — LORAZEPAM 2 MG/ML
1 INJECTION INTRAMUSCULAR
Status: COMPLETED | OUTPATIENT
Start: 2021-11-21 | End: 2021-11-21

## 2021-11-21 RX ADMIN — LORAZEPAM 1 MG: 2 INJECTION INTRAMUSCULAR; INTRAVENOUS at 08:40

## 2021-11-21 RX ADMIN — ALPRAZOLAM 1 MG: 0.5 TABLET ORAL at 10:23

## 2021-11-21 NOTE — DISCHARGE INSTRUCTIONS
Restart Xanax. Call your primary care doctor tomorrow if prescription needed. May try to use spacer with inhaler. Also call lung doctors for follow-up appointment.

## 2021-11-21 NOTE — ED NOTES
Pt presents to the ED via EMS from home for shortness of breath. Hx of sleep apnea and anxiety. Pt reports taking xanax for anxiety and has been out for the past week. Reports not being able to fall asleep.  Denies chest pain, n/v/d.

## 2021-11-21 NOTE — ED NOTES
I have reviewed discharge instructions with the patient. The patient verbalized understanding. Patient left ED via Discharge Method: ambulatory to Home with wife    Opportunity for questions and clarification provided. Patient given 1 scripts. To continue your aftercare when you leave the hospital, you may receive an automated call from our care team to check in on how you are doing. This is a free service and part of our promise to provide the best care and service to meet your aftercare needs.  If you have questions, or wish to unsubscribe from this service please call [phone]. Thank you for Choosing our [facility] Emergency Department.

## 2021-11-21 NOTE — ED PROVIDER NOTES
53-year-old male woke up 4 in the morning with some shortness of breath. States this is happened before and sometimes can lead to him feeling panicky, which it did. He has some slight chest pain on the left. This is occurred before, with his panicky feeling when he awakens with shortness of breath. Chest pain was transient to the left upper chest and not associated with any vomiting vomiting or diaphoresis. No pleurisy. Feels slightly short of breath right now. Past history is significant for coronary artery disease with stenting. Review of records reveals catheterization of May 2021 that revealed some slight in-stent stenosis but not hemodynamically significant. Also history history of reflux. There is some history of COPD. History of sleep apnea but not wearing CPAP at this point. Review of records reveals he had seen a pulmonologist within the last year but some discrepancy about the need for oxygen or CPAP. No recent fever chills or cough. Of note is a, as patient has been on Xanax but has been out for 1 week. Prescribed by his primary care doctor. No history of DVT or PE. Patient does not use inhaler or nebulizer. He had hip replacement surgery about 3 months ago    The history is provided by the patient. Shortness of Breath  This is a new problem. The current episode started 3 to 5 hours ago. The problem has been gradually improving. Associated symptoms include wheezing (Mild on occasion) and chest pain. Pertinent negatives include no fever, no neck pain, no cough, no sputum production, no hemoptysis, no syncope, no vomiting, no abdominal pain, no rash, no leg pain and no leg swelling. He has tried nothing for the symptoms. He has had no prior hospitalizations. Associated medical issues include COPD and CAD. Associated medical issues do not include asthma, PE, heart failure, DVT or recent surgery (3 months ago).         Past Medical History:   Diagnosis Date    Anxiety     Back pain     CAD (coronary artery disease)     Followed by [facility], Dr. Karlos Benjamin, Nitroglycerin PRN-last used 2021    Chronic obstructive pulmonary disease ([facility] [address]     Patient does not have any inhalers at this time, and would like to switch pulmonologist      Depression     GERD (gastroesophageal reflux disease)     managed with medication     H/O echocardiogram 2020    EF 60-65%    Hypercholesterolemia     managed with medication     Hypertension     managed with medication     Hypothyroidism     managed with medication     MI (myocardial infarction) ([facility] [address]     2 stents placed at that time, daily 81 mg ASA, followed by Dr. Roverto Curran Nausea & vomiting     KIRAN on CPAP     Thyroid disease        Past Surgical History:   Procedure Laterality Date    HX GI  2021    EGD     HX HEART CATHETERIZATION  2021    HX LUMBAR FUSION      HX ORTHOPAEDIC  1984    elbow infusion    HX PTCA  2008    stents x 2         Family History:   Problem Relation Age of Onset    Cancer Mother     Prostate Cancer Father     Heart Disease Father        Social History     Socioeconomic History    Marital status:      Spouse name: Not on file    Number of children: Not on file    Years of education: Not on file    Highest education level: Not on file   Occupational History    Not on file   Tobacco Use    Smoking status: Former Smoker     Packs/day: 1.50     Years: 17.00     Pack years: 25.50     Quit date:      Years since quittin.8    Smokeless tobacco: Never Used   Substance and Sexual Activity    Alcohol use: Never    Drug use: Never    Sexual activity: Yes     Partners: Female   Other Topics Concern    Not on file   Social History Narrative     and lives with wife. Has one dog. Has lived in [place] and [place]. Works as a .      Social Determinants of Health     Financial Resource Strain:     Difficulty of Paying Living Expenses: Not on file   Food Insecurity:     Worried About [facility] in the Last Year: Not on file    Claire of Food in the Last Year: Not on file   Transportation Needs:     Lack of Transportation (Medical): Not on file    Lack of Transportation (Non-Medical): Not on file   Physical Activity:     Days of Exercise per Week: Not on file    Minutes of Exercise per Session: Not on file   Stress:     Feeling of Stress : Not on file   Social Connections:     Frequency of Communication with Friends and Family: Not on file    Frequency of Social Gatherings with Friends and Family: Not on file    Attends Cheondo[employer] Services: Not on file    Active Member of [address] [place] [address] or Organizations: Not on file    Attends Club or Organization Meetings: Not on file    Marital Status: Not on file   Intimate Partner Violence:     Fear of Current or Ex-Partner: Not on file    Emotionally Abused: Not on file    Physically Abused: Not on file    Sexually Abused: Not on file   Housing Stability:     Unable to Pay for Housing in the Last Year: Not on file    Number of [place] in the Last Year: Not on file    Unstable Housing in the Last Year: Not on file         ALLERGIES: Patient has no known allergies. Review of Systems   Constitutional: Negative for chills and fever. Respiratory: Positive for shortness of breath and wheezing (Mild on occasion). Negative for cough, hemoptysis and sputum production. Cardiovascular: Positive for chest pain. Negative for palpitations, leg swelling and syncope. Gastrointestinal: Negative for abdominal pain, diarrhea and vomiting. Genitourinary: Negative for flank pain. Musculoskeletal: Negative for back pain and neck pain. Skin: Negative for color change and rash. Neurological: Negative for syncope. All other systems reviewed and are negative.       Vitals:    11/21/21 0825 11/21/21 0830 11/21/21 0830   BP: (!) 141/75     Pulse: 77     Resp: 18     Temp: 98.3 °F (36.8 °C)     SpO2: 96%  95%   Weight:  108.9 kg (240 lb)    Height:  6' (1.829 m)             Physical Exam  Vitals and nursing note reviewed. Constitutional:       General: He is not in acute distress. Appearance: He is well-developed. HENT:      Head: Normocephalic and atraumatic. Right Ear: External ear normal.      Left Ear: External ear normal.      Mouth/Throat:      Pharynx: No oropharyngeal exudate. Eyes:      General: No scleral icterus. Conjunctiva/sclera: Conjunctivae normal.   Cardiovascular:      Rate and Rhythm: Normal rate and regular rhythm. Heart sounds: No murmur heard. Pulmonary:      Effort: No respiratory distress. Breath sounds: Normal breath sounds. Abdominal:      General: Bowel sounds are normal.      Palpations: Abdomen is soft. There is no mass. Tenderness: There is no abdominal tenderness. There is no guarding or rebound. Hernia: No hernia is present. Musculoskeletal:         General: No swelling or tenderness. Right lower leg: No edema. Left lower leg: No edema. Skin:     General: Skin is warm and dry. Neurological:      Mental Status: He is alert and oriented to person, place, and time. Gait: Gait normal.      Comments: Nl speech   Psychiatric:         Speech: Speech normal.          MDM  Number of Diagnoses or Management Options  Diagnosis management comments: Transient chest pain and some shortness of breath. Possibility episode due to sleep apnea of hypoxemia. Check EKG and troponin however. Screen for congestive failure. Doubt pulmonary embolism. Amount and/or Complexity of Data Reviewed  Clinical lab tests: reviewed and ordered  Tests in the radiology section of CPT®: ordered and reviewed  Tests in the medicine section of CPT®: ordered and reviewed  Review and summarize past medical records: yes  Independent visualization of images, tracings, or specimens: yes (My interpretation EKG shows normal sinus rhythm 75. No ST-T changes. No ectopy.   Normal QT interval.)    Risk of Complications, Morbidity, and/or Mortality  Presenting problems: moderate  Management options: low    Patient Progress  Patient progress: stable         Procedures      Results Include:    Recent Results (from the past 24 hour(s))   EKG, 12 LEAD, INITIAL    Collection Time: 11/21/21  8:30 AM   Result Value Ref Range    Ventricular Rate 75 BPM    Atrial Rate 75 BPM    P-R Interval 170 ms    QRS Duration 94 ms    Q-T Interval 360 ms    QTC Calculation (Bezet) 402 ms    Calculated P Axis 51 degrees    Calculated R Axis 62 degrees    Calculated T Axis 72 degrees    Diagnosis       !! AGE AND GENDER SPECIFIC ECG ANALYSIS !! Normal sinus rhythm  Normal ECG  When compared with ECG of 12-MAY-2021 14:38,  No significant change was found     CBC WITH AUTOMATED DIFF    Collection Time: 11/21/21  8:35 AM   Result Value Ref Range    WBC 6.9 4.3 - 11.1 K/uL    RBC 5.12 4.23 - 5.6 M/uL    HGB 14.4 13.6 - 17.2 g/dL    HCT 45.2 41.1 - 50.3 %    MCV 88.3 79.6 - 97.8 FL    MCH 28.1 26.1 - 32.9 PG    MCHC 31.9 31.4 - 35.0 g/dL    RDW 13.7 11.9 - 14.6 %    PLATELET 227 963 - 906 K/uL    MPV 11.0 9.4 - 12.3 FL    ABSOLUTE NRBC 0.00 0.0 - 0.2 K/uL    DF AUTOMATED      NEUTROPHILS 73 43 - 78 %    LYMPHOCYTES 17 13 - 44 %    MONOCYTES 7 4.0 - 12.0 %    EOSINOPHILS 2 0.5 - 7.8 %    BASOPHILS 0 0.0 - 2.0 %    IMMATURE GRANULOCYTES 0 0.0 - 5.0 %    ABS. NEUTROPHILS 5.1 1.7 - 8.2 K/UL    ABS. LYMPHOCYTES 1.2 0.5 - 4.6 K/UL    ABS. MONOCYTES 0.5 0.1 - 1.3 K/UL    ABS. EOSINOPHILS 0.2 0.0 - 0.8 K/UL    ABS. BASOPHILS 0.0 0.0 - 0.2 K/UL    ABS. IMM.  GRANS. 0.0 0.0 - 0.5 K/UL   METABOLIC PANEL, COMPREHENSIVE    Collection Time: 11/21/21  8:35 AM   Result Value Ref Range    Sodium 138 136 - 145 mmol/L    Potassium 3.7 3.5 - 5.1 mmol/L    Chloride 103 98 - 107 mmol/L    CO2 33 (H) 21 - 32 mmol/L    Anion gap 2 (L) 7 - 16 mmol/L    Glucose 102 (H) 65 - 100 mg/dL    BUN 12 6 - 23 MG/DL    Creatinine 1.00 0.8 - 1.5 MG/DL    GFR est AA >60 >60 ml/min/1.73m2    GFR est non-AA >60 >60 ml/min/1.73m2    Calcium 8.2 (L) 8.3 - 10.4 MG/DL    Bilirubin, total 0.5 0.2 - 1.1 MG/DL    ALT (SGPT) 28 12 - 65 U/L    AST (SGOT) 11 (L) 15 - 37 U/L    Alk. phosphatase 75 50 - 136 U/L    Protein, total 6.7 6.3 - 8.2 g/dL    Albumin 3.2 (L) 3.5 - 5.0 g/dL    Globulin 3.5 2.3 - 3.5 g/dL    A-G Ratio 0.9 (L) 1.2 - 3.5     TROPONIN-HIGH SENSITIVITY    Collection Time: 11/21/21  8:35 AM   Result Value Ref Range    Troponin-High Sensitivity 11.0 0 - 14 pg/mL   NT-PRO BNP    Collection Time: 11/21/21  8:35 AM   Result Value Ref Range    NT pro-BNP 54 5 - 125 PG/ML     XR CHEST PA LAT    Result Date: 11/21/2021  Two view chest History: ER order-SOB, left-sided chest pain, anxiety; onset around 0400 today Comparison: 05/12/2021 Findings: The heart is normal in size and configuration. The lungs and pleural spaces are clear. The pulmonary vascularity is within normal limits. The visualized osseous structures are unremarkable. No active disease in the chest.     Patient resting well in the department. Review of  reveals patient had Xanax prescription filled 3 weeks ago. He states he does not remember that and perhaps he is misplaced them in the house. Due to potential for withdrawal seizures, feel obligated to write him for 48hours Xanax to cut down on potential for seizures and withdrawal symptoms.

## 2021-12-09 PROBLEM — G47.00 INSOMNIA: Status: ACTIVE | Noted: 2021-12-09

## 2021-12-09 PROBLEM — N40.0 BPH (BENIGN PROSTATIC HYPERPLASIA): Status: ACTIVE | Noted: 2021-12-09

## 2021-12-11 ENCOUNTER — APPOINTMENT (OUTPATIENT)
Dept: CT IMAGING | Age: 56
End: 2021-12-11
Attending: EMERGENCY MEDICINE
Payer: COMMERCIAL

## 2021-12-11 ENCOUNTER — HOSPITAL ENCOUNTER (EMERGENCY)
Age: 56
Discharge: HOME | End: 2021-12-11
Attending: EMERGENCY MEDICINE
Payer: COMMERCIAL

## 2021-12-11 VITALS
TEMPERATURE: 98.3 F | WEIGHT: 240 LBS | OXYGEN SATURATION: 97 % | HEART RATE: 79 BPM | DIASTOLIC BLOOD PRESSURE: 71 MMHG | BODY MASS INDEX: 32.51 KG/M2 | SYSTOLIC BLOOD PRESSURE: 134 MMHG | RESPIRATION RATE: 21 BRPM | HEIGHT: 72 IN

## 2021-12-11 DIAGNOSIS — R06.02 SOB (SHORTNESS OF BREATH): Primary | ICD-10-CM

## 2021-12-11 LAB
ALBUMIN SERPL-MCNC: 3.7 G/DL (ref 3.5–5)
ALBUMIN/GLOB SERPL: 1 {RATIO} (ref 1.2–3.5)
ALP SERPL-CCNC: 100 U/L (ref 50–136)
ALT SERPL-CCNC: 27 U/L (ref 12–65)
ANION GAP SERPL CALC-SCNC: 6 MMOL/L (ref 7–16)
AST SERPL-CCNC: 9 U/L (ref 15–37)
BASOPHILS # BLD: 0 K/UL (ref 0–0.2)
BASOPHILS NFR BLD: 0 % (ref 0–2)
BILIRUB SERPL-MCNC: 0.6 MG/DL (ref 0.2–1.1)
BUN SERPL-MCNC: 19 MG/DL (ref 6–23)
CALCIUM SERPL-MCNC: 9.2 MG/DL (ref 8.3–10.4)
CHLORIDE SERPL-SCNC: 103 MMOL/L (ref 98–107)
CO2 SERPL-SCNC: 29 MMOL/L (ref 21–32)
CREAT SERPL-MCNC: 1.41 MG/DL (ref 0.8–1.5)
DIFFERENTIAL METHOD BLD: ABNORMAL
EOSINOPHIL # BLD: 0.2 K/UL (ref 0–0.8)
EOSINOPHIL NFR BLD: 2 % (ref 0.5–7.8)
ERYTHROCYTE [DISTWIDTH] IN BLOOD BY AUTOMATED COUNT: 13.9 % (ref 11.9–14.6)
GLOBULIN SER CALC-MCNC: 3.8 G/DL (ref 2.3–3.5)
GLUCOSE SERPL-MCNC: 127 MG/DL (ref 65–100)
HCT VFR BLD AUTO: 51.7 % (ref 41.1–50.3)
HGB BLD-MCNC: 16.5 G/DL (ref 13.6–17.2)
IMM GRANULOCYTES # BLD AUTO: 0 K/UL (ref 0–0.5)
IMM GRANULOCYTES NFR BLD AUTO: 0 % (ref 0–5)
LIPASE SERPL-CCNC: 98 U/L (ref 73–393)
LYMPHOCYTES # BLD: 1.5 K/UL (ref 0.5–4.6)
LYMPHOCYTES NFR BLD: 16 % (ref 13–44)
MAGNESIUM SERPL-MCNC: 2 MG/DL (ref 1.8–2.4)
MCH RBC QN AUTO: 27.3 PG (ref 26.1–32.9)
MCHC RBC AUTO-ENTMCNC: 31.9 G/DL (ref 31.4–35)
MCV RBC AUTO: 85.6 FL (ref 79.6–97.8)
MONOCYTES # BLD: 0.7 K/UL (ref 0.1–1.3)
MONOCYTES NFR BLD: 8 % (ref 4–12)
NEUTS SEG # BLD: 7 K/UL (ref 1.7–8.2)
NEUTS SEG NFR BLD: 74 % (ref 43–78)
NRBC # BLD: 0 K/UL (ref 0–0.2)
PLATELET # BLD AUTO: 239 K/UL (ref 150–450)
PMV BLD AUTO: 11.5 FL (ref 9.4–12.3)
POTASSIUM SERPL-SCNC: 3.8 MMOL/L (ref 3.5–5.1)
PROT SERPL-MCNC: 7.5 G/DL (ref 6.3–8.2)
RBC # BLD AUTO: 6.04 M/UL (ref 4.23–5.6)
SODIUM SERPL-SCNC: 138 MMOL/L (ref 136–145)
TROPONIN-HIGH SENSITIVITY: 11 PG/ML (ref 0–14)
TROPONIN-HIGH SENSITIVITY: 11.2 PG/ML (ref 0–14)
WBC # BLD AUTO: 9.4 K/UL (ref 4.3–11.1)

## 2021-12-11 PROCEDURE — 85025 COMPLETE CBC W/AUTO DIFF WBC: CPT

## 2021-12-11 PROCEDURE — 99284 EMERGENCY DEPT VISIT MOD MDM: CPT

## 2021-12-11 PROCEDURE — 84484 ASSAY OF TROPONIN QUANT: CPT

## 2021-12-11 PROCEDURE — 83690 ASSAY OF LIPASE: CPT

## 2021-12-11 PROCEDURE — 83735 ASSAY OF MAGNESIUM: CPT

## 2021-12-11 PROCEDURE — 93005 ELECTROCARDIOGRAM TRACING: CPT | Performed by: EMERGENCY MEDICINE

## 2021-12-11 PROCEDURE — 74011000258 HC RX REV CODE- 258: Performed by: EMERGENCY MEDICINE

## 2021-12-11 PROCEDURE — 71260 CT THORAX DX C+: CPT

## 2021-12-11 PROCEDURE — 94762 N-INVAS EAR/PLS OXIMTRY CONT: CPT

## 2021-12-11 PROCEDURE — 74011250636 HC RX REV CODE- 250/636: Performed by: EMERGENCY MEDICINE

## 2021-12-11 PROCEDURE — 80053 COMPREHEN METABOLIC PANEL: CPT

## 2021-12-11 PROCEDURE — 74011000636 HC RX REV CODE- 636: Performed by: EMERGENCY MEDICINE

## 2021-12-11 RX ORDER — SODIUM CHLORIDE 0.9 % (FLUSH) 0.9 %
5-10 SYRINGE (ML) INJECTION AS NEEDED
Status: DISCONTINUED | OUTPATIENT
Start: 2021-12-11 | End: 2021-12-12 | Stop reason: HOSPADM

## 2021-12-11 RX ORDER — SODIUM CHLORIDE 0.9 % (FLUSH) 0.9 %
5-10 SYRINGE (ML) INJECTION EVERY 8 HOURS
Status: DISCONTINUED | OUTPATIENT
Start: 2021-12-11 | End: 2021-12-12 | Stop reason: HOSPADM

## 2021-12-11 RX ORDER — SODIUM CHLORIDE 0.9 % (FLUSH) 0.9 %
10 SYRINGE (ML) INJECTION
Status: COMPLETED | OUTPATIENT
Start: 2021-12-11 | End: 2021-12-11

## 2021-12-11 RX ADMIN — SODIUM CHLORIDE 1000 ML: 900 INJECTION, SOLUTION INTRAVENOUS at 19:07

## 2021-12-11 RX ADMIN — IOPAMIDOL 100 ML: 755 INJECTION, SOLUTION INTRAVENOUS at 19:58

## 2021-12-11 RX ADMIN — Medication 10 ML: at 19:58

## 2021-12-11 RX ADMIN — SODIUM CHLORIDE 1000 ML: 900 INJECTION, SOLUTION INTRAVENOUS at 20:27

## 2021-12-11 RX ADMIN — SODIUM CHLORIDE 100 ML: 9 INJECTION, SOLUTION INTRAVENOUS at 19:57

## 2021-12-12 LAB
ATRIAL RATE: 125 BPM
CALCULATED P AXIS, ECG09: 58 DEGREES
CALCULATED R AXIS, ECG10: 77 DEGREES
CALCULATED T AXIS, ECG11: 56 DEGREES
DIAGNOSIS, 93000: NORMAL
P-R INTERVAL, ECG05: 148 MS
Q-T INTERVAL, ECG07: 316 MS
QRS DURATION, ECG06: 92 MS
QTC CALCULATION (BEZET), ECG08: 456 MS
VENTRICULAR RATE, ECG03: 125 BPM

## 2021-12-12 NOTE — ED PROVIDER NOTES
61-year-old male presenting for chest pain, lightheadedness some episodes of shortness of breath. He tells me this is been going on for a long time has been worked up in [place] for it. When he was living down there they had him on oxygen but since he is moved here tell them that it is not indicated. Believes that this keeps happening whenever he tries to fall asleep. He thinks that he stopped breathing. Currently the patient is feeling lightheaded somewhat short of breath. Whenever he stands up the symptoms get worse. The history is provided by the patient. Chest Pain (Angina)   Associated symptoms include shortness of breath.         Past Medical History:   Diagnosis Date    Anxiety     Back pain     CAD (coronary artery disease)     Followed by [facility], Dr. Brett Ayon, Nitroglycerin PRN-last used 5/2021    Chronic obstructive pulmonary disease ([facility] [address]     Patient does not have any inhalers at this time, and would like to switch pulmonologist      Depression     GERD (gastroesophageal reflux disease)     managed with medication     H/O echocardiogram 04/28/2020    EF 60-65%    Hypercholesterolemia     managed with medication     Hypertension     managed with medication     Hypothyroidism     managed with medication     MI (myocardial infarction) ([facility] [address] 2008    2 stents placed at that time, daily 81 mg ASA, followed by Dr. Jade Eli Nausea & vomiting     KIRAN on CPAP     Thyroid disease        Past Surgical History:   Procedure Laterality Date    HX GI  05/2021    EGD     HX HEART CATHETERIZATION  05/12/2021    HX LUMBAR FUSION      HX ORTHOPAEDIC  1984    elbow infusion    HX PTCA  2008    stents x 2         Family History:   Problem Relation Age of Onset    Cancer Mother     Prostate Cancer Father     Heart Disease Father        Social History     Socioeconomic History    Marital status:      Spouse name: Not on file    Number of children: Not on file    Years of education: Not on file    Highest education level: Not on file   Occupational History    Not on file   Tobacco Use    Smoking status: Former Smoker     Packs/day: 1.50     Years: 17.00     Pack years: 25.50     Quit date:      Years since quittin.9    Smokeless tobacco: Never Used   Substance and Sexual Activity    Alcohol use: Never    Drug use: Never    Sexual activity: Yes     Partners: Female   Other Topics Concern    Not on file   Social History Narrative     and lives with wife. Has one dog. Has lived in [facility]. Works as a . Social Determinants of Health     Financial Resource Strain:     Difficulty of Paying Living Expenses: Not on file   Food Insecurity:     Worried About Running Out of Food in the Last Year: Not on file    Claire of Food in the Last Year: Not on file   Transportation Needs:     Lack of Transportation (Medical): Not on file    Lack of Transportation (Non-Medical): Not on file   Physical Activity:     Days of Exercise per Week: Not on file    Minutes of Exercise per Session: Not on file   Stress:     Feeling of Stress : Not on file   Social Connections:     Frequency of Communication with Friends and Family: Not on file    Frequency of Social Gatherings with Friends and Family: Not on file    Attends Hindu Services: Not on file    Active Member of [address] [place] [address] [employer] or Organizations: Not on file    Attends Club or Organization Meetings: Not on file    Marital Status: Not on file   Intimate Partner Violence:     Fear of Current or Ex-Partner: Not on file    Emotionally Abused: Not on file    Physically Abused: Not on file    Sexually Abused: Not on file   Housing Stability:     Unable to Pay for Housing in the Last Year: Not on file    Number of [place] in the Last Year: Not on file    Unstable Housing in the Last Year: Not on file         ALLERGIES: Patient has no known allergies.     Review of Systems   Constitutional: Positive for activity change and fatigue. Respiratory: Positive for apnea, chest tightness and shortness of breath. Cardiovascular: Positive for chest pain. Neurological: Positive for light-headedness. All other systems reviewed and are negative. Vitals:    12/11/21 1853   BP: (!) 84/64   Pulse: (!) 114   Resp: 18   Temp: 98.3 °F (36.8 °C)   SpO2: 97%   Weight: 108.9 kg (240 lb)   Height: 6' (1.829 m)            Physical Exam  Vitals and nursing note reviewed. Constitutional:       Appearance: He is well-developed and normal weight. HENT:      Head: Normocephalic and atraumatic. Eyes:      Conjunctiva/sclera: Conjunctivae normal.      Pupils: Pupils are equal, round, and reactive to light. Cardiovascular:      Rate and Rhythm: Regular rhythm. Tachycardia present. Heart sounds: Normal heart sounds. Pulmonary:      Effort: Pulmonary effort is normal.      Breath sounds: Normal breath sounds. Abdominal:      General: Bowel sounds are normal.      Palpations: Abdomen is soft. Musculoskeletal:         General: No deformity. Normal range of motion. Cervical back: Normal range of motion and neck supple. Skin:     General: Skin is warm and dry. Neurological:      Mental Status: He is alert and oriented to person, place, and time. Cranial Nerves: No cranial nerve deficit. Psychiatric:         Behavior: Behavior normal.          MDM  Number of Diagnoses or Management Options  SOB (shortness of breath)  Diagnosis management comments: 52-year-old male presenting for evaluation of shortness of breath, chest tightness lightheadedness was found to be hypotensive and tachycardic upon arrival.  My evaluation was more reassuring. Heart rate was 95 and O2 sats were 97% on room air.        Amount and/or Complexity of Data Reviewed  Clinical lab tests: ordered and reviewed (Results for orders placed or performed during the hospital encounter of 12/11/21  -TROPONIN-HIGH SENSITIVITY: Result                      Value             Ref Range           Troponin-High Sensitiv*     11.0              0 - 14 pg/mL   -CBC WITH AUTOMATED DIFF:        Result                      Value             Ref Range           WBC                         9.4               4.3 - 11.1 K*       RBC                         6. 04 (H)          4.23 - 5.6 M*       HGB                         16.5              13.6 - 17.2 *       HCT                         51.7 (H)          41.1 - 50.3 %       MCV                         85.6              79.6 - 97.8 *       MCH                         27.3              26.1 - 32.9 *       MCHC                        31.9              31.4 - 35.0 *       RDW                         13.9              11.9 - 14.6 %       PLATELET                    239               150 - 450 K/*       MPV                         11.5              9.4 - 12.3 FL       ABSOLUTE NRBC               0.00              0.0 - 0.2 K/*       DF                          AUTOMATED                             NEUTROPHILS                 74                43 - 78 %           LYMPHOCYTES                 16                13 - 44 %           MONOCYTES                   8                 4.0 - 12.0 %        EOSINOPHILS                 2                 0.5 - 7.8 %         BASOPHILS                   0                 0.0 - 2.0 %         IMMATURE GRANULOCYTES       0                 0.0 - 5.0 %         ABS. NEUTROPHILS            7.0               1.7 - 8.2 K/*       ABS. LYMPHOCYTES            1.5               0.5 - 4.6 K/*       ABS. MONOCYTES              0.7               0.1 - 1.3 K/*       ABS. EOSINOPHILS            0.2               0.0 - 0.8 K/*       ABS. BASOPHILS              0.0               0.0 - 0.2 K/*       ABS. IMM.  GRANS.            0.0               0.0 - 0.5 K/*  -METABOLIC PANEL, COMPREHENSIVE:        Result                      Value             Ref Range           Sodium                      138 136 - 145 mm*       Potassium                   3.8               3.5 - 5.1 mm*       Chloride                    103               98 - 107 mmo*       CO2                         29                21 - 32 mmol*       Anion gap                   6 (L)             7 - 16 mmol/L       Glucose                     127 (H)           65 - 100 mg/*       BUN                         19                6 - 23 MG/DL        Creatinine                  1.41              0.8 - 1.5 MG*       GFR est AA                  >60               >60 ml/min/1*       GFR est non-AA              55 (L)            >60 ml/min/1*       Calcium                     9.2               8.3 - 10.4 M*       Bilirubin, total            0.6               0.2 - 1.1 MG*       ALT (SGPT)                  27                12 - 65 U/L         AST (SGOT)                  9 (L)             15 - 37 U/L         Alk.  phosphatase            100               50 - 136 U/L        Protein, total              7.5               6.3 - 8.2 g/*       Albumin                     3.7               3.5 - 5.0 g/*       Globulin                    3.8 (H)           2.3 - 3.5 g/*       A-G Ratio                   1.0 (L)           1.2 - 3.5      -LIPASE:        Result                      Value             Ref Range           Lipase                      98                73 - 393 U/L   -MAGNESIUM:        Result                      Value             Ref Range           Magnesium                   2.0               1.8 - 2.4 mg*  -EKG, 12 LEAD, INITIAL:        Result                      Value             Ref Range           Ventricular Rate            125               BPM                 Atrial Rate                 125               BPM                 P-R Interval                148               ms                  QRS Duration                92                ms                  Q-T Interval                316               ms                  QTC Calculation (Gricel) 456               ms                  Calculated P Axis           58                degrees             Calculated R Axis           77                degrees             Calculated T Axis           56                degrees             Diagnosis                                                     Sinus tachycardia   Incomplete right bundle branch block   Possible Inferior infarct , age undetermined   Abnormal ECG   When compared with ECG of 21-NOV-2021 08:30,   Vent. rate has increased BY  50 BPM   Incomplete right bundle branch block is now Present     )  Tests in the radiology section of CPT®: ordered and reviewed (XR CHEST PA LAT    Result Date: 11/21/2021  Two view chest History: ER order-SOB, left-sided chest pain, anxiety; onset around 0400 today Comparison: 05/12/2021 Findings: The heart is normal in size and configuration. The lungs and pleural spaces are clear. The pulmonary vascularity is within normal limits. The visualized osseous structures are unremarkable. No active disease in the chest.     CT CHEST PULMONARY EMBOLISM    Result Date: 12/11/2021  EXAM: CT angiogram chest. HISTORY: cp, sob ,ro pe. TECHNIQUE: CT angiographic images of the chest were obtained following intravenous administration of contrast. Imaging was performed utilizing PE protocol. Examination was performed in the axial plane and coronal reconstructions were performed. 3-D MIPS reconstructions of the chest were obtained. Dose reduction technique used: Automated exposure control/Adjustment of the mA and/or kV according to patient size/Use of iterative reconstruction technique. COMPARISON: None. FINDINGS: There is adequate opacification of the pulmonary arterial tree. No significant filling defects are identified to suggest pulmonary embolism. Main pulmonary artery is normal in caliber. The heart is not enlarged and there is no pericardial effusion. There are atherosclerotic changes seen in the coronary vessels.  The great vessels appear normal. The visualized thyroid is unremarkable. There are no pathologically enlarged mediastinal hilar or axillary lymph nodes. Trachea and mainstem bronchi are patent. There is no consolidation, pleural effusion, or pneumothorax. No soft tissue nodules are seen. There is a tiny pleural-based calcified nodule in the left lower lobe. The visualized upper abdomen is unremarkable. Osseous structures are within normal limits. 1. No evidence of pulmonary embolism. 2. No acute intrathoracic process.     )  Tests in the medicine section of CPT®: ordered and reviewed  Independent visualization of images, tracings, or specimens: yes    Risk of Complications, Morbidity, and/or Mortality  Presenting problems: high  Diagnostic procedures: high  Management options: moderate  General comments: I personally reviewed the patient's vital signs, laboratory tests, and/or radiological findings. I discussed these findings with the patient and their significance. I answered all questions and gave the patient clear return precautions.   The patient was discharged from the emergency department in stable condition        Patient Progress  Patient progress: improved    ED Course as of 12/11/21 1925   Sat Dec 11, 2021   1903 EKG performed shows sinus tachycardia rate 125, left axis deviation, Q waves in the inferior leads,    IL interval 148 ms QRS duration 92 ms QT/QTc 316/456 ms    No ST changes [JS]      ED Course User Index  [JS] Terrial Backbone, MD       Procedures

## 2021-12-12 NOTE — DISCHARGE INSTRUCTIONS
Your work-up today is reassuring. Continue working towards evaluation with pulmonary.   There is no clear intervention or need to stay in the hospital.

## 2022-01-10 PROBLEM — Z00.00 ANNUAL PHYSICAL EXAM: Status: ACTIVE | Noted: 2022-01-10

## 2022-01-23 ENCOUNTER — APPOINTMENT (OUTPATIENT)
Dept: GENERAL RADIOLOGY | Age: 57
End: 2022-01-23
Attending: EMERGENCY MEDICINE
Payer: COMMERCIAL

## 2022-01-23 ENCOUNTER — HOSPITAL ENCOUNTER (EMERGENCY)
Age: 57
Discharge: HOME | End: 2022-01-24
Attending: EMERGENCY MEDICINE
Payer: COMMERCIAL

## 2022-01-23 ENCOUNTER — HOSPITAL ENCOUNTER (EMERGENCY)
Age: 57
Discharge: OTHER INSTITUTION | End: 2022-01-23
Attending: EMERGENCY MEDICINE
Payer: COMMERCIAL

## 2022-01-23 ENCOUNTER — APPOINTMENT (OUTPATIENT)
Dept: CT IMAGING | Age: 57
End: 2022-01-23
Attending: EMERGENCY MEDICINE
Payer: COMMERCIAL

## 2022-01-23 VITALS
OXYGEN SATURATION: 98 % | BODY MASS INDEX: 33.86 KG/M2 | DIASTOLIC BLOOD PRESSURE: 89 MMHG | HEIGHT: 72 IN | RESPIRATION RATE: 20 BRPM | HEART RATE: 79 BPM | SYSTOLIC BLOOD PRESSURE: 147 MMHG | WEIGHT: 250 LBS | TEMPERATURE: 97.3 F

## 2022-01-23 DIAGNOSIS — U07.1 COVID-19: Primary | ICD-10-CM

## 2022-01-23 DIAGNOSIS — U07.1 COVID-19 VIRUS INFECTION: Primary | ICD-10-CM

## 2022-01-23 DIAGNOSIS — R07.9 CHEST PAIN, UNSPECIFIED TYPE: ICD-10-CM

## 2022-01-23 DIAGNOSIS — J44.9 CHRONIC OBSTRUCTIVE PULMONARY DISEASE, UNSPECIFIED COPD TYPE (HCC): ICD-10-CM

## 2022-01-23 LAB
ALBUMIN SERPL-MCNC: 3.5 G/DL (ref 3.5–5)
ALBUMIN/GLOB SERPL: 0.9 {RATIO} (ref 1.2–3.5)
ALP SERPL-CCNC: 97 U/L (ref 50–136)
ALT SERPL-CCNC: 97 U/L (ref 12–65)
ANION GAP SERPL CALC-SCNC: 3 MMOL/L (ref 7–16)
AST SERPL-CCNC: 69 U/L (ref 15–37)
ATRIAL RATE: 80 BPM
BASOPHILS # BLD: 0 K/UL (ref 0–0.2)
BASOPHILS NFR BLD: 0 % (ref 0–2)
BILIRUB SERPL-MCNC: 0.5 MG/DL (ref 0.2–1.1)
BUN SERPL-MCNC: 23 MG/DL (ref 6–23)
CALCIUM SERPL-MCNC: 8.6 MG/DL (ref 8.3–10.4)
CALCULATED P AXIS, ECG09: 46 DEGREES
CALCULATED R AXIS, ECG10: 70 DEGREES
CALCULATED T AXIS, ECG11: 81 DEGREES
CHLORIDE SERPL-SCNC: 108 MMOL/L (ref 98–107)
CO2 SERPL-SCNC: 25 MMOL/L (ref 21–32)
CREAT SERPL-MCNC: 1.15 MG/DL (ref 0.8–1.5)
DIAGNOSIS, 93000: NORMAL
DIFFERENTIAL METHOD BLD: ABNORMAL
EOSINOPHIL # BLD: 0 K/UL (ref 0–0.8)
EOSINOPHIL NFR BLD: 0 % (ref 0.5–7.8)
ERYTHROCYTE [DISTWIDTH] IN BLOOD BY AUTOMATED COUNT: 16.1 % (ref 11.9–14.6)
GLOBULIN SER CALC-MCNC: 3.7 G/DL (ref 2.3–3.5)
GLUCOSE SERPL-MCNC: 100 MG/DL (ref 65–100)
HCT VFR BLD AUTO: 52.9 % (ref 41.1–50.3)
HGB BLD-MCNC: 17.1 G/DL (ref 13.6–17.2)
IMM GRANULOCYTES # BLD AUTO: 0 K/UL (ref 0–0.5)
IMM GRANULOCYTES NFR BLD AUTO: 0 % (ref 0–5)
LYMPHOCYTES # BLD: 0.5 K/UL (ref 0.5–4.6)
LYMPHOCYTES NFR BLD: 13 % (ref 13–44)
MAGNESIUM SERPL-MCNC: 2.2 MG/DL (ref 1.8–2.4)
MCH RBC QN AUTO: 28 PG (ref 26.1–32.9)
MCHC RBC AUTO-ENTMCNC: 32.3 G/DL (ref 31.4–35)
MCV RBC AUTO: 86.6 FL (ref 79.6–97.8)
MONOCYTES # BLD: 0.3 K/UL (ref 0.1–1.3)
MONOCYTES NFR BLD: 7 % (ref 4–12)
NEUTS SEG # BLD: 2.8 K/UL (ref 1.7–8.2)
NEUTS SEG NFR BLD: 80 % (ref 43–78)
NRBC # BLD: 0 K/UL (ref 0–0.2)
P-R INTERVAL, ECG05: 144 MS
PLATELET # BLD AUTO: 112 K/UL (ref 150–450)
PMV BLD AUTO: 11.2 FL (ref 9.4–12.3)
POTASSIUM SERPL-SCNC: 4.2 MMOL/L (ref 3.5–5.1)
PROT SERPL-MCNC: 7.2 G/DL (ref 6.3–8.2)
Q-T INTERVAL, ECG07: 378 MS
QRS DURATION, ECG06: 88 MS
QTC CALCULATION (BEZET), ECG08: 435 MS
RBC # BLD AUTO: 6.11 M/UL (ref 4.23–5.6)
SODIUM SERPL-SCNC: 136 MMOL/L (ref 136–145)
TROPONIN-HIGH SENSITIVITY: 11 PG/ML (ref 0–14)
TROPONIN-HIGH SENSITIVITY: 7.8 PG/ML (ref 0–14)
VENTRICULAR RATE, ECG03: 80 BPM
WBC # BLD AUTO: 3.5 K/UL (ref 4.3–11.1)

## 2022-01-23 PROCEDURE — 74011000258 HC RX REV CODE- 258: Performed by: EMERGENCY MEDICINE

## 2022-01-23 PROCEDURE — 74011000636 HC RX REV CODE- 636: Performed by: EMERGENCY MEDICINE

## 2022-01-23 PROCEDURE — 99284 EMERGENCY DEPT VISIT MOD MDM: CPT

## 2022-01-23 PROCEDURE — M0247 HC SOTROVIMAB INFUSION: HCPCS

## 2022-01-23 PROCEDURE — 85025 COMPLETE CBC W/AUTO DIFF WBC: CPT

## 2022-01-23 PROCEDURE — 96361 HYDRATE IV INFUSION ADD-ON: CPT

## 2022-01-23 PROCEDURE — 71045 X-RAY EXAM CHEST 1 VIEW: CPT

## 2022-01-23 PROCEDURE — 74011250637 HC RX REV CODE- 250/637: Performed by: EMERGENCY MEDICINE

## 2022-01-23 PROCEDURE — 96374 THER/PROPH/DIAG INJ IV PUSH: CPT

## 2022-01-23 PROCEDURE — 99285 EMERGENCY DEPT VISIT HI MDM: CPT

## 2022-01-23 PROCEDURE — 74011250636 HC RX REV CODE- 250/636: Performed by: EMERGENCY MEDICINE

## 2022-01-23 PROCEDURE — 84484 ASSAY OF TROPONIN QUANT: CPT

## 2022-01-23 PROCEDURE — 93005 ELECTROCARDIOGRAM TRACING: CPT | Performed by: NURSE PRACTITIONER

## 2022-01-23 PROCEDURE — 80053 COMPREHEN METABOLIC PANEL: CPT

## 2022-01-23 PROCEDURE — 96360 HYDRATION IV INFUSION INIT: CPT

## 2022-01-23 PROCEDURE — 71260 CT THORAX DX C+: CPT

## 2022-01-23 PROCEDURE — 83735 ASSAY OF MAGNESIUM: CPT

## 2022-01-23 RX ORDER — ACETAMINOPHEN 500 MG
1000 TABLET ORAL
Status: COMPLETED | OUTPATIENT
Start: 2022-01-23 | End: 2022-01-23

## 2022-01-23 RX ORDER — SODIUM CHLORIDE 0.9 % (FLUSH) 0.9 %
10 SYRINGE (ML) INJECTION
Status: COMPLETED | OUTPATIENT
Start: 2022-01-23 | End: 2022-01-23

## 2022-01-23 RX ORDER — ALBUTEROL SULFATE 90 UG/1
2 AEROSOL, METERED RESPIRATORY (INHALATION)
Qty: 1 EACH | Refills: 0 | Status: SHIPPED | OUTPATIENT
Start: 2022-01-23 | End: 2022-04-28 | Stop reason: SDUPTHER

## 2022-01-23 RX ORDER — ONDANSETRON 2 MG/ML
4 INJECTION INTRAMUSCULAR; INTRAVENOUS
Status: COMPLETED | OUTPATIENT
Start: 2022-01-23 | End: 2022-01-23

## 2022-01-23 RX ORDER — SODIUM CHLORIDE, SODIUM LACTATE, POTASSIUM CHLORIDE, CALCIUM CHLORIDE 600; 310; 30; 20 MG/100ML; MG/100ML; MG/100ML; MG/100ML
500 INJECTION, SOLUTION INTRAVENOUS
Status: COMPLETED | OUTPATIENT
Start: 2022-01-23 | End: 2022-01-23

## 2022-01-23 RX ADMIN — SODIUM CHLORIDE 500 MG: 9 INJECTION, SOLUTION INTRAVENOUS at 22:25

## 2022-01-23 RX ADMIN — SODIUM CHLORIDE, SODIUM LACTATE, POTASSIUM CHLORIDE, AND CALCIUM CHLORIDE 500 ML: 600; 310; 30; 20 INJECTION, SOLUTION INTRAVENOUS at 13:00

## 2022-01-23 RX ADMIN — IOPAMIDOL 100 ML: 755 INJECTION, SOLUTION INTRAVENOUS at 13:51

## 2022-01-23 RX ADMIN — Medication 10 ML: at 13:51

## 2022-01-23 RX ADMIN — ACETAMINOPHEN 1000 MG: 500 TABLET, FILM COATED ORAL at 22:24

## 2022-01-23 RX ADMIN — ONDANSETRON 4 MG: 2 INJECTION INTRAMUSCULAR; INTRAVENOUS at 22:24

## 2022-01-23 RX ADMIN — ACETAMINOPHEN 1000 MG: 500 TABLET, FILM COATED ORAL at 11:55

## 2022-01-23 RX ADMIN — SODIUM CHLORIDE 100 ML: 900 INJECTION, SOLUTION INTRAVENOUS at 13:51

## 2022-01-23 NOTE — ED NOTES
TRANSFER - OUT REPORT:    Verbal report given to Jeff Nava RN on Jaclyn Gold  being transferred to [facility] ER for routine progression of care       Report consisted of patients Situation, Background, Assessment and   Recommendations(SBAR). Information from the following report(s) SBAR, Kardex, ED Summary and Recent Results was reviewed with the receiving nurse. Lines:   Peripheral IV 01/23/22 Left Wrist (Active)   Site Assessment Clean, dry, & intact 01/23/22 0917   Phlebitis Assessment 0 01/23/22 0917   Infiltration Assessment 0 01/23/22 0917       Peripheral IV 01/23/22 Left Antecubital (Active)   Site Assessment Clean, dry, & intact 01/23/22 1302   Phlebitis Assessment 0 01/23/22 1302   Infiltration Assessment 0 01/23/22 1302   Dressing Status Clean, dry, & intact 01/23/22 1302   Dressing Type Transparent 01/23/22 1302   Hub Color/Line Status Green 01/23/22 1302        Opportunity for questions and clarification was provided.       Patient transported with:   Oxygen via [employer]

## 2022-01-23 NOTE — ED PROVIDER NOTES
49-year-old male with history of COPD is on home oxygen presents with coronavirus of symptoms and a positive test 5 days ago. He has been ill for maybe 7 days he thinks. His symptoms include cough congestion and fever for the last several days and sharp chest pain when he breathes. No radiation of this pain or other aggravating or alleviating factors. No other associated symptoms. Shortness of breath and chest pain. No vomiting or diarrhea. Patient is unvaccinated for coronavirus.            Past Medical History:   Diagnosis Date    Anxiety     Back pain     CAD (coronary artery disease)     Followed by [facility], Dr. Nino Sherman, Nitroglycerin PRN-last used 5/2021    Chronic obstructive pulmonary disease ([facility]     Patient does not have any inhalers at this time, and would like to switch pulmonologist      Depression     GERD (gastroesophageal reflux disease)     managed with medication     H/O echocardiogram 04/28/2020    EF 60-65%    Hypercholesterolemia     managed with medication     Hypertension     managed with medication     Hypothyroidism     managed with medication     MI (myocardial infarction) ([facility] [address] 2008    2 stents placed at that time, daily 81 mg ASA, followed by Dr. Richard Haq Nausea & vomiting     KIRAN on CPAP     Thyroid disease        Past Surgical History:   Procedure Laterality Date    HX GI  05/2021    EGD     HX HEART CATHETERIZATION  05/12/2021    HX HIP REPLACEMENT Left 09/03/2021    total    HX LUMBAR FUSION      HX ORTHOPAEDIC  1984    elbow infusion    HX PTCA  2008    stents x 2         Family History:   Problem Relation Age of Onset    Cancer Mother     Prostate Cancer Father     Heart Disease Father        Social History     Socioeconomic History    Marital status:      Spouse name: Not on file    Number of children: Not on file    Years of education: Not on file    Highest education level: Not on file   Occupational History    Not on file Tobacco Use    Smoking status: Former Smoker     Packs/day: 1.50     Years: 17.00     Pack years: 25.50     Quit date:      Years since quittin.0    Smokeless tobacco: Never Used   Substance and Sexual Activity    Alcohol use: Never    Drug use: Never    Sexual activity: Yes     Partners: Female   Other Topics Concern    Not on file   Social History Narrative     and lives with wife. Has one dog. Has lived in [facility]. Works as a . Social Determinants of Health     Financial Resource Strain:     Difficulty of Paying Living Expenses: Not on file   Food Insecurity:     Worried About Running Out of Food in the Last Year: Not on file    Claire of Food in the Last Year: Not on file   Transportation Needs:     Lack of Transportation (Medical): Not on file    Lack of Transportation (Non-Medical): Not on file   Physical Activity:     Days of Exercise per Week: Not on file    Minutes of Exercise per Session: Not on file   Stress:     Feeling of Stress : Not on file   Social Connections:     Frequency of Communication with Friends and Family: Not on file    Frequency of Social Gatherings with Friends and Family: Not on file    Attends Moravian Services: Not on file    Active Member of [address] [place] [address] [employer] or Organizations: Not on file    Attends Club or Organization Meetings: Not on file    Marital Status: Not on file   Intimate Partner Violence:     Fear of Current or Ex-Partner: Not on file    Emotionally Abused: Not on file    Physically Abused: Not on file    Sexually Abused: Not on file   Housing Stability:     Unable to Pay for Housing in the Last Year: Not on file    Number of [place] in the Last Year: Not on file    Unstable Housing in the Last Year: Not on file         ALLERGIES: Patient has no known allergies. Review of Systems   Constitutional: Positive for chills, fatigue and fever. HENT: Negative for congestion and rhinorrhea.     Respiratory: Positive for cough and shortness of breath. Cardiovascular: Positive for chest pain. Negative for leg swelling. Gastrointestinal: Negative for abdominal pain, diarrhea, nausea and vomiting. Endocrine: Negative for polydipsia and polyuria. Genitourinary: Negative for dysuria and frequency. Musculoskeletal: Positive for myalgias. Negative for arthralgias. All other systems reviewed and are negative. Vitals:    01/23/22 0823   BP: (!) 126/97   Pulse: 91   Resp: 20   Temp: 98.5 °F (36.9 °C)   SpO2: 96%   Weight: 113.4 kg (250 lb)   Height: 6' (1.829 m)            Physical Exam  Vitals and nursing note reviewed. Constitutional:       General: He is not in acute distress. Appearance: He is well-developed. He is ill-appearing. He is not toxic-appearing or diaphoretic. HENT:      Mouth/Throat:      Pharynx: No oropharyngeal exudate. Eyes:      Conjunctiva/sclera: Conjunctivae normal.      Pupils: Pupils are equal, round, and reactive to light. Cardiovascular:      Rate and Rhythm: Normal rate and regular rhythm. Heart sounds: Normal heart sounds. Pulmonary:      Effort: Pulmonary effort is normal.      Breath sounds: Normal breath sounds. Abdominal:      General: Bowel sounds are normal. There is no distension. Palpations: Abdomen is soft. Tenderness: There is no abdominal tenderness. There is no guarding or rebound. Musculoskeletal:         General: No tenderness. Normal range of motion. Cervical back: Neck supple. Lymphadenopathy:      Cervical: No cervical adenopathy. Skin:     General: Skin is warm and dry. Neurological:      Mental Status: He is alert and oriented to person, place, and time.           MDM  Number of Diagnoses or Management Options  Diagnosis management comments: After speaking with his wife the patient is agreeable to treat with monoclonal antibodies given his unvaccinated status and the fact that he has been ill for 7 days and is not qualify for the oral antivirals. We do not have so treatment FaBB here at this hospital but I will do an ER to ER transfer so he can get treated at [facility] [address]. I will give him IV fluids here and scan his chest for PE given his pleuritic chest pain then transferred for treatment. If he does have PE we will treat that patient will be admitted here       Amount and/or Complexity of Data Reviewed  Clinical lab tests: ordered and reviewed (Results for orders placed or performed during the hospital encounter of 01/23/22  -TROPONIN-HIGH SENSITIVITY:        Result                      Value             Ref Range           Troponin-High Sensitiv*     11.0              0 - 14 pg/mL   -METABOLIC PANEL, COMPREHENSIVE:        Result                      Value             Ref Range           Sodium                      136               136 - 145 mm*       Potassium                   4.2               3.5 - 5.1 mm*       Chloride                    108 (H)           98 - 107 mmo*       CO2                         25                21 - 32 mmol*       Anion gap                   3 (L)             7 - 16 mmol/L       Glucose                     100               65 - 100 mg/*       BUN                         23                6 - 23 MG/DL        Creatinine                  1.15              0.8 - 1.5 MG*       GFR est AA                  >60               >60 ml/min/1*       GFR est non-AA              >60               >60 ml/min/1*       Calcium                     8.6               8.3 - 10.4 M*       Bilirubin, total            0.5               0.2 - 1.1 MG*       ALT (SGPT)                  97 (H)            12 - 65 U/L         AST (SGOT)                  69 (H)            15 - 37 U/L         Alk.  phosphatase            97                50 - 136 U/L        Protein, total              7.2               6.3 - 8.2 g/*       Albumin                     3.5               3.5 - 5.0 g/*       Globulin                    3.7 (H) 2.3 - 3.5 g/*       A-G Ratio                   0.9 (L)           1.2 - 3.5      -MAGNESIUM:        Result                      Value             Ref Range           Magnesium                   2.2               1.8 - 2.4 mg*  -CBC WITH AUTOMATED DIFF:        Result                      Value             Ref Range           WBC                         3.5 (L)           4.3 - 11.1 K*       RBC                         6.11 (H)          4.23 - 5.6 M*       HGB                         17.1              13.6 - 17.2 *       HCT                         52.9 (H)          41.1 - 50.3 %       MCV                         86.6              79.6 - 97.8 *       MCH                         28.0              26.1 - 32.9 *       MCHC                        32.3              31.4 - 35.0 *       RDW                         16.1 (H)          11.9 - 14.6 %       PLATELET                    112 (L)           150 - 450 K/*       MPV                         11.2              9.4 - 12.3 FL       ABSOLUTE NRBC               0.00              0.0 - 0.2 K/*       DF                          AUTOMATED                             NEUTROPHILS                 80 (H)            43 - 78 %           LYMPHOCYTES                 13                13 - 44 %           MONOCYTES                   7                 4.0 - 12.0 %        EOSINOPHILS                 0 (L)             0.5 - 7.8 %         BASOPHILS                   0                 0.0 - 2.0 %         IMMATURE GRANULOCYTES       0                 0.0 - 5.0 %         ABS. NEUTROPHILS            2.8               1.7 - 8.2 K/*       ABS. LYMPHOCYTES            0.5               0.5 - 4.6 K/*       ABS. MONOCYTES              0.3               0.1 - 1.3 K/*       ABS. EOSINOPHILS            0.0               0.0 - 0.8 K/*       ABS. BASOPHILS              0.0               0.0 - 0.2 K/*       ABS. IMM.  GRANS.            0.0               0.0 - 0.5 K/*  -EKG, 12 LEAD, INITIAL:        Result Value             Ref Range           Ventricular Rate            80                BPM                 Atrial Rate                 80                BPM                 P-R Interval                144               ms                  QRS Duration                88                ms                  Q-T Interval                378               ms                  QTC Calculation (Bezet)     435               ms                  Calculated P Axis           46                degrees             Calculated R Axis           70                degrees             Calculated T Axis           81                degrees             Diagnosis                                                     Normal sinus rhythm   Normal ECG   When compared with ECG of 11-DEC-2021 18:09,   Vent. rate has decreased BY  45 BPM     )  Tests in the radiology section of [facility]®: ordered and reviewed (XR CHEST PORT    Result Date: 1/23/2022  EXAM: CHEST X-RAY, 1 VIEW INDICATION: Chest pain and shortness of breath. COMPARISON: Chest x-ray 11/21/2021 and chest CT 12/11/2021 TECHNIQUE: Single AP view of the chest was obtained. FINDINGS: The lungs are clear and the pulmonary vasculature is normal. No pneumothorax or pleural effusion. The cardiomediastinal silhouette is within normal limits. The osseous structures are unremarkable.      No radiographic evidence of acute cardiopulmonary disease.     )  Tests in the medicine section of CPT®: ordered and reviewed  Review and summarize past medical records: yes  Discuss the patient with other providers: yes  Independent visualization of images, tracings, or specimens: yes (EKG interpretation in absence of cardiology  EKG shows a normal sinus rhythm, normal axis, normal intervals, no acute ST-T changes  Normal EKG  Interpreted by ED physician Dr. Ginny Arroyo)    Risk of Complications, Morbidity, and/or Mortality  Presenting problems: high  Diagnostic procedures: low  Management options: moderate    Patient Progress  Patient progress: stable         Procedures

## 2022-01-23 NOTE — ED NOTES
TRANSFER - OUT REPORT:    Verbal report given to [facility] EMS tranport personnel on Jaclyn Gold  being transferred to [facility] ER for routine progression of care       Report consisted of patients Situation, Background, Assessment and   Recommendations(SBAR). Information from the following report(s) SBAR and ED Summary was reviewed with the receiving nurse. Lines:   Peripheral IV 01/23/22 Left Wrist (Active)   Site Assessment Clean, dry, & intact 01/23/22 0917   Phlebitis Assessment 0 01/23/22 0917   Infiltration Assessment 0 01/23/22 0917       Peripheral IV 01/23/22 Left Antecubital (Active)   Site Assessment Clean, dry, & intact 01/23/22 1302   Phlebitis Assessment 0 01/23/22 1302   Infiltration Assessment 0 01/23/22 1302   Dressing Status Clean, dry, & intact 01/23/22 1302   Dressing Type Transparent 01/23/22 1302   Hub Color/Line Status Green 01/23/22 1302        Opportunity for questions and clarification was provided.       Patient transported with: [facility] ambulance transport service with EMTALA and appropriate paperwork   O2 @ 2 liters

## 2022-01-23 NOTE — DISCHARGE INSTRUCTIONS
After your monoclonal antibody treatment at [facility]OSP[facility] [address] you may return home with your usual amount of oxygen. Return for severe worsening trouble breathing. Tylenol and Motrin for aches pains and fevers. Albuterol 2 puffs every 4 hours as needed for cough and wheezing. Warm fluids chicken soup and cough drops for cough. Isolate and quarantine until symptoms are resolved and no fever for 24 hours. Be sure to wear a mask to protect others an additional 5 days following your improvement.

## 2022-01-23 NOTE — ED NOTES
78-year-old male with history significant for COPD presents to the emergency department with complaints of chest pain and shortness of breath, onset of the symptoms last night. Endorses cough with green-colored expectorate, other URI symptoms x5 days. States he tested positive for COVID 5 days ago. Arrives by EMS for increased shortness of breath. States that he has O2 at home, as needed. Reported by EMS to have room air SPO2 of 95% at home, he is afebrile. Conversant with no increased respiratory effort. Patient is on O2 in the lobby. Syncope or near syncope, no diaphoresis. No exertional component reported. No nausea or vomiting, back pain, hemoptysis. Patient evaluated initially in triage. Rapid Medical Evaluation was conducted and necessary orders have been placed. I have performed a medical screening exam.  Care will now be transferred to the provider in the back of the emergency department.   Alexis Johns NP 8:28 AM

## 2022-01-23 NOTE — ED TRIAGE NOTES
Pt to ED from [facility] via EMS for antibody treatment for COVID 19. Pt tested positive 5 days ago. pmh copd on 2.5L of oxygen at home. Pt currently requiring 4L n/s. Oxygen sat 100%. Masked.

## 2022-01-23 NOTE — ED TRIAGE NOTES
Patient arrives via [address] [place] [address] from home. EMS reports: patient reports being [facility] tested + for covid on 1/19. Patient states lost of smell and taste, decreased appetite, and weakness x 5 days.     Patients SPO2 is 95% ra    EMS v/s: 144/84 76 rr 12 95%ra temp 98.1

## 2022-01-24 VITALS
TEMPERATURE: 99.8 F | WEIGHT: 250 LBS | HEART RATE: 92 BPM | SYSTOLIC BLOOD PRESSURE: 138 MMHG | RESPIRATION RATE: 22 BRPM | OXYGEN SATURATION: 95 % | HEIGHT: 72 IN | DIASTOLIC BLOOD PRESSURE: 91 MMHG | BODY MASS INDEX: 33.86 KG/M2

## 2022-01-24 NOTE — ED NOTES
I have reviewed discharge instructions with the patient. The patient verbalized understanding. Patient left ED via Discharge Method: ambulatory to Home with ride. Opportunity for questions and clarification provided. Patient given 0 scripts. To continue your aftercare when you leave the hospital, you may receive an automated call from our care team to check in on how you are doing. This is a free service and part of our promise to provide the best care and service to meet your aftercare needs.  If you have questions, or wish to unsubscribe from this service please call [phone]. Thank you for Choosing our [facility] Emergency Department.

## 2022-01-24 NOTE — ED PROVIDER NOTES
Mask was worn during the entire patient examination. Uma Cameron is a 60 y.o. male who presents to the ED with a chief complaint of shortness of breath and fatigue. Patient has history of positive COVID test about 5 days ago. He was actually seen at the [facility] ED see Dr. Lynn Roy note for full details. He was sent here for monoclonal antibodies as they did not have any at that hospital.  He does qualify. He actually tells me he has not been on any home oxygen in a year and a half. I have taken him off the oxygen and he is above 95% on room air. The history is provided by the patient.    Positive For Covid-19  Associated symptoms: cough    Associated symptoms: no chest pain, no chills, no diarrhea, no nausea, no rash and no vomiting         Past Medical History:   Diagnosis Date    Anxiety     Back pain     CAD (coronary artery disease)     Followed by [facility], Dr. Johana Bonner, Nitroglycerin PRN-last used 5/2021    Chronic obstructive pulmonary disease ([facility] [address]     Patient does not have any inhalers at this time, and would like to switch pulmonologist      Depression     GERD (gastroesophageal reflux disease)     managed with medication     H/O echocardiogram 04/28/2020    EF 60-65%    Hypercholesterolemia     managed with medication     Hypertension     managed with medication     Hypothyroidism     managed with medication     MI (myocardial infarction) ([address] 2008    2 stents placed at that time, daily 81 mg ASA, followed by Dr. Zayra Baker Nausea & vomiting     KIRAN on CPAP     Thyroid disease        Past Surgical History:   Procedure Laterality Date    HX GI  05/2021    EGD     HX HEART CATHETERIZATION  05/12/2021    HX HIP REPLACEMENT Left 09/03/2021    total    HX LUMBAR FUSION      HX ORTHOPAEDIC  1984    elbow infusion    HX PTCA  2008    stents x 2         Family History:   Problem Relation Age of Onset    Cancer Mother     Prostate Cancer Father     Heart Disease Father Social History     Socioeconomic History    Marital status:      Spouse name: Not on file    Number of children: Not on file    Years of education: Not on file    Highest education level: Not on file   Occupational History    Not on file   Tobacco Use    Smoking status: Former Smoker     Packs/day: 1.50     Years: 17.00     Pack years: 25.50     Quit date:      Years since quittin.0    Smokeless tobacco: Never Used   Substance and Sexual Activity    Alcohol use: Never    Drug use: Never    Sexual activity: Yes     Partners: Female   Other Topics Concern    Not on file   Social History Narrative     and lives with wife. Has one dog. Has lived in [facility]. Works as a . Social Determinants of Health     Financial Resource Strain:     Difficulty of Paying Living Expenses: Not on file   Food Insecurity:     Worried About Running Out of Food in the Last Year: Not on file    Claire of Food in the Last Year: Not on file   Transportation Needs:     Lack of Transportation (Medical): Not on file    Lack of Transportation (Non-Medical):  Not on file   Physical Activity:     Days of Exercise per Week: Not on file    Minutes of Exercise per Session: Not on file   Stress:     Feeling of Stress : Not on file   Social Connections:     Frequency of Communication with Friends and Family: Not on file    Frequency of Social Gatherings with Friends and Family: Not on file    Attends Yazidism Services: Not on file    Active Member of Clubs or Organizations: Not on file    Attends Club or Organization Meetings: Not on file    Marital Status: Not on file   Intimate Partner Violence:     Fear of Current or Ex-Partner: Not on file    Emotionally Abused: Not on file    Physically Abused: Not on file    Sexually Abused: Not on file   Housing Stability:     Unable to Pay for Housing in the Last Year: Not on file    Number of [place] in the Last Year: Not on file    Unstable Housing in the Last Year: Not on file         ALLERGIES: Patient has no known allergies. Review of Systems   Constitutional: Positive for fatigue and fever. Negative for chills. Respiratory: Positive for cough and shortness of breath. Negative for chest tightness. Cardiovascular: Negative for chest pain and palpitations. Gastrointestinal: Negative for abdominal pain, diarrhea, nausea and vomiting. Skin: Negative for pallor and rash. All other systems reviewed and are negative. Vitals:    01/23/22 1837 01/23/22 2030   Pulse: 92    Resp: 22    Temp: 98.8 °F (37.1 °C) 99.8 °F (37.7 °C)   SpO2: 99%    Weight: 113.4 kg (250 lb)    Height: 6' (1.829 m)             Physical Exam  Vitals and nursing note reviewed. Constitutional:       General: He is not in acute distress. Appearance: Normal appearance. He is well-developed. He is not ill-appearing, toxic-appearing or diaphoretic. HENT:      Head: Normocephalic and atraumatic. Eyes:      General: No scleral icterus. Conjunctiva/sclera: Conjunctivae normal.   Neck:      Trachea: No tracheal deviation. Pulmonary:      Effort: Pulmonary effort is normal. No respiratory distress. Breath sounds: No stridor. No wheezing, rhonchi or rales. Neurological:      Mental Status: He is alert. Mental status is at baseline. Psychiatric:         Mood and Affect: Mood normal.         Behavior: Behavior normal.          MDM  Number of Diagnoses or Management Options  Diagnosis management comments: Monoclonal's ordered. Plan to discharge as long as room air oxygen is normal.    Priti Sellers MD; 1/23/2022 @9:18 PM Voice dictation software was used during the making of this note. This software is not perfect and grammatical and other typographical errors may be present.   This note has not been proofread for errors.  ====================================        Amount and/or Complexity of Data Reviewed  Clinical lab tests: reviewed  Tests in the radiology section of CPT®: reviewed           Procedures

## 2022-02-09 PROBLEM — R00.2 PALPITATIONS: Status: ACTIVE | Noted: 2022-02-09

## 2022-02-09 PROBLEM — R06.09 DOE (DYSPNEA ON EXERTION): Status: ACTIVE | Noted: 2022-02-09

## 2022-03-08 PROBLEM — U09.9 POST-COVID-19 SYNDROME: Status: ACTIVE | Noted: 2022-03-08

## 2022-03-18 PROBLEM — Z00.00 HEALTHCARE MAINTENANCE: Status: ACTIVE | Noted: 2021-06-22

## 2022-03-18 PROBLEM — J44.9 COPD (CHRONIC OBSTRUCTIVE PULMONARY DISEASE) (HCC): Status: ACTIVE | Noted: 2020-07-14

## 2022-03-18 PROBLEM — M54.9 CHRONIC BACK PAIN: Status: ACTIVE | Noted: 2020-07-14

## 2022-03-18 PROBLEM — I10 HTN (HYPERTENSION): Status: ACTIVE | Noted: 2020-07-14

## 2022-03-18 PROBLEM — R06.09 DOE (DYSPNEA ON EXERTION): Status: ACTIVE | Noted: 2022-02-09

## 2022-03-18 PROBLEM — I25.10 CAD IN NATIVE ARTERY: Status: ACTIVE | Noted: 2020-07-14

## 2022-03-18 PROBLEM — E03.9 HYPOTHYROIDISM (ACQUIRED): Status: ACTIVE | Noted: 2020-09-15

## 2022-03-18 PROBLEM — K21.9 GERD (GASTROESOPHAGEAL REFLUX DISEASE): Status: ACTIVE | Noted: 2020-09-15

## 2022-03-18 PROBLEM — G89.29 CHRONIC BACK PAIN: Status: ACTIVE | Noted: 2020-07-14

## 2022-03-19 PROBLEM — Z98.890 S/P LUMBAR SPINE OPERATION: Status: ACTIVE | Noted: 2020-09-15

## 2022-03-19 PROBLEM — R00.2 PALPITATIONS: Status: ACTIVE | Noted: 2022-02-09

## 2022-03-19 PROBLEM — E34.9 HYPOTESTOSTERONEMIA: Status: ACTIVE | Noted: 2020-07-14

## 2022-03-19 PROBLEM — H10.10 ALLERGIC CONJUNCTIVITIS: Status: ACTIVE | Noted: 2020-09-29

## 2022-03-19 PROBLEM — F41.0 PANIC ANXIETY SYNDROME: Status: ACTIVE | Noted: 2020-07-14

## 2022-03-19 PROBLEM — Z79.899 CHRONIC PRESCRIPTION BENZODIAZEPINE USE: Status: ACTIVE | Noted: 2020-12-21

## 2022-03-19 PROBLEM — M19.90 OA (OSTEOARTHRITIS): Status: ACTIVE | Noted: 2021-04-16

## 2022-03-19 PROBLEM — U09.9 POST-COVID-19 SYNDROME: Status: ACTIVE | Noted: 2022-03-08

## 2022-03-19 PROBLEM — G47.00 INSOMNIA: Status: ACTIVE | Noted: 2021-12-09

## 2022-03-19 PROBLEM — Z00.00 ANNUAL PHYSICAL EXAM: Status: ACTIVE | Noted: 2022-01-10

## 2022-03-19 PROBLEM — Z87.891 HISTORY OF TOBACCO USE: Status: ACTIVE | Noted: 2020-12-29

## 2022-03-19 PROBLEM — N52.9 ED (ERECTILE DYSFUNCTION): Status: ACTIVE | Noted: 2020-09-15

## 2022-03-19 PROBLEM — R73.02 IGT (IMPAIRED GLUCOSE TOLERANCE): Status: ACTIVE | Noted: 2020-09-15

## 2022-03-19 PROBLEM — G47.33 OSA (OBSTRUCTIVE SLEEP APNEA): Status: ACTIVE | Noted: 2020-08-11

## 2022-03-20 PROBLEM — R07.9 CHEST PAIN: Status: ACTIVE | Noted: 2021-05-12

## 2022-03-20 PROBLEM — E78.5 HYPERLIPIDEMIA: Status: ACTIVE | Noted: 2020-07-14

## 2022-03-20 PROBLEM — F11.20 METHADONE DEPENDENCE (HCC): Status: ACTIVE | Noted: 2020-12-21

## 2022-03-20 PROBLEM — N40.0 BPH (BENIGN PROSTATIC HYPERPLASIA): Status: ACTIVE | Noted: 2021-12-09

## 2022-04-01 ENCOUNTER — HOSPITAL ENCOUNTER (OUTPATIENT)
Dept: CARDIAC REHAB | Age: 57
End: 2022-04-01

## 2022-04-07 ENCOUNTER — APPOINTMENT (OUTPATIENT)
Dept: CARDIAC REHAB | Age: 57
End: 2022-04-07
Attending: INTERNAL MEDICINE

## 2022-04-26 PROBLEM — R07.9 CHEST PAIN: Status: RESOLVED | Noted: 2021-05-12 | Resolved: 2022-04-26

## 2022-04-26 PROBLEM — R06.09 DOE (DYSPNEA ON EXERTION): Status: RESOLVED | Noted: 2022-02-09 | Resolved: 2022-04-26

## 2022-04-26 PROBLEM — E78.5 HLD (HYPERLIPIDEMIA): Status: ACTIVE | Noted: 2020-07-14

## 2022-04-28 ENCOUNTER — HOSPITAL ENCOUNTER (OUTPATIENT)
Dept: SLEEP MEDICINE | Age: 57
Discharge: HOME | End: 2022-04-28
Payer: COMMERCIAL

## 2022-04-28 ENCOUNTER — HOSPITAL ENCOUNTER (OUTPATIENT)
Dept: CARDIAC REHAB | Age: 57
Discharge: HOME | End: 2022-04-28

## 2022-04-28 PROCEDURE — 95806 SLEEP STUDY UNATT&RESP EFFT: CPT

## 2022-04-29 NOTE — CARDIO/PULMONARY
Dear Dr. Deborah Mayer:  ? Thank you for referring your patient, Zachary Garnica (JHT:[birth year]), to the Cardiopulmonary Rehabilitation Program at [facility]. Mr. Pastor Coreas is a good candidate for the Pulmonary Rehab Program and should see improvements with regular participation. ?  We will be addressing appropriate interventions for modifiable risk factors with your patient during the next 12 weeks. We will contact you with any issues or concerns that may arise, or you can follow your patient's progress through [facility] at any time. A final summary will be available on [facility] when the program is completed. Again, thank you for your referral. If we can be of further assistance, please feel free to contact the Cardiopulmonary Rehab staff at [phone].  ? Sincerely,  ?   NANCY Galeas, RN  Cardiopulmonary Rehabilitation Nurse  [facility]

## 2022-05-05 ENCOUNTER — HOSPITAL ENCOUNTER (OUTPATIENT)
Dept: CARDIAC REHAB | Age: 57
Discharge: HOME | End: 2022-05-05
Payer: COMMERCIAL

## 2022-05-05 VITALS — HEIGHT: 72 IN | BODY MASS INDEX: 32.91 KG/M2 | WEIGHT: 243 LBS

## 2022-05-05 PROCEDURE — G0239 OTH RESP PROC, GROUP: HCPCS

## 2022-05-17 ENCOUNTER — HOSPITAL ENCOUNTER (OUTPATIENT)
Dept: CARDIAC REHAB | Age: 57
Discharge: HOME | End: 2022-05-17
Payer: COMMERCIAL

## 2022-05-17 VITALS — BODY MASS INDEX: 32.98 KG/M2 | WEIGHT: 243.2 LBS

## 2022-05-17 PROCEDURE — G0239 OTH RESP PROC, GROUP: HCPCS

## 2022-05-19 ENCOUNTER — HOSPITAL ENCOUNTER (OUTPATIENT)
Dept: CARDIAC REHAB | Age: 57
Discharge: HOME | End: 2022-05-19
Payer: COMMERCIAL

## 2022-05-19 PROCEDURE — G0239 OTH RESP PROC, GROUP: HCPCS

## 2022-05-23 DIAGNOSIS — N40.1 BENIGN PROSTATIC HYPERPLASIA WITH INCOMPLETE BLADDER EMPTYING: ICD-10-CM

## 2022-05-23 DIAGNOSIS — E78.5 HYPERLIPIDEMIA, UNSPECIFIED HYPERLIPIDEMIA TYPE: ICD-10-CM

## 2022-05-23 DIAGNOSIS — Z12.5 SCREENING FOR PROSTATE CANCER: ICD-10-CM

## 2022-05-23 DIAGNOSIS — E34.9 HYPOTESTOSTERONEMIA: ICD-10-CM

## 2022-05-23 DIAGNOSIS — R39.14 BENIGN PROSTATIC HYPERPLASIA WITH INCOMPLETE BLADDER EMPTYING: ICD-10-CM

## 2022-05-23 DIAGNOSIS — I10 HYPERTENSION, UNSPECIFIED TYPE: ICD-10-CM

## 2022-05-23 DIAGNOSIS — R73.02 IGT (IMPAIRED GLUCOSE TOLERANCE): Primary | ICD-10-CM

## 2022-05-23 DIAGNOSIS — E03.9 HYPOTHYROIDISM (ACQUIRED): ICD-10-CM

## 2022-05-24 ENCOUNTER — HOSPITAL ENCOUNTER (OUTPATIENT)
Dept: CARDIAC REHAB | Age: 57
Setting detail: RECURRING SERIES
Discharge: HOME | End: 2022-05-27
Payer: COMMERCIAL

## 2022-05-24 VITALS — WEIGHT: 241 LBS | BODY MASS INDEX: 32.69 KG/M2

## 2022-05-24 PROCEDURE — G0239 OTH RESP PROC, GROUP: HCPCS

## 2022-05-24 ASSESSMENT — EXERCISE STRESS TEST
PEAK_HR: 119
PEAK_BP: 98/60
PEAK_METS: 2.4

## 2022-05-31 ENCOUNTER — HOSPITAL ENCOUNTER (OUTPATIENT)
Dept: CARDIAC REHAB | Age: 57
Setting detail: RECURRING SERIES
Discharge: HOME | End: 2022-06-03
Payer: COMMERCIAL

## 2022-05-31 VITALS — BODY MASS INDEX: 32.63 KG/M2 | WEIGHT: 240.6 LBS

## 2022-05-31 PROCEDURE — 94625 PHY/QHP OP PULM RHB W/O MNTR: CPT

## 2022-05-31 ASSESSMENT — EXERCISE STRESS TEST
PEAK_BP: 102/70
PEAK_METS: 2.4
PEAK_HR: 114

## 2022-06-02 ENCOUNTER — HOSPITAL ENCOUNTER (OUTPATIENT)
Dept: CARDIAC REHAB | Age: 57
Setting detail: RECURRING SERIES
Discharge: HOME | End: 2022-06-05
Payer: COMMERCIAL

## 2022-06-02 ENCOUNTER — TELEPHONE (OUTPATIENT)
Dept: FAMILY MEDICINE CLINIC | Facility: CLINIC | Age: 57
End: 2022-06-02

## 2022-06-02 PROCEDURE — 94625 PHY/QHP OP PULM RHB W/O MNTR: CPT

## 2022-06-02 ASSESSMENT — EXERCISE STRESS TEST
PEAK_METS: 2.4
PEAK_BP: 100/62
PEAK_HR: 106

## 2022-06-02 NOTE — TELEPHONE ENCOUNTER
Mr. Butt Soco called stating that he is still having dizzy and lightheaded spells. He states that he is not sure his home cuff is accurate because his reading are between 120-140/70-80, but today at [facility] it was taken manually twice: 89/60 and 90/60. Please advise.

## 2022-06-02 NOTE — TELEPHONE ENCOUNTER
Needs to come in for a nurse visit to have his BP manually checked for confirmation; he should also bring along his machine for an immediate comparison.

## 2022-06-06 NOTE — TELEPHONE ENCOUNTER
I left a message to ask Mr. Lynette Cornelius to call and schedule a nurse visit so we can compare a manual BP to his machine.

## 2022-06-07 ENCOUNTER — HOSPITAL ENCOUNTER (OUTPATIENT)
Dept: CARDIAC REHAB | Age: 57
Setting detail: RECURRING SERIES
Discharge: HOME | End: 2022-06-10
Payer: COMMERCIAL

## 2022-06-07 ENCOUNTER — TELEPHONE (OUTPATIENT)
Dept: FAMILY MEDICINE CLINIC | Facility: CLINIC | Age: 57
End: 2022-06-07

## 2022-06-07 VITALS — WEIGHT: 241 LBS | BODY MASS INDEX: 32.69 KG/M2

## 2022-06-07 PROCEDURE — 94625 PHY/QHP OP PULM RHB W/O MNTR: CPT

## 2022-06-07 ASSESSMENT — EXERCISE STRESS TEST
PEAK_BP: 108/76
PEAK_HR: 128
PEAK_METS: 2.7

## 2022-06-07 NOTE — TELEPHONE ENCOUNTER
David Fox called stating that he took his BP cuff to PT today. He states they compared his cuff to a manual reading and it was only a few numbers off. The manual reading was after PT and was 100/68, pulse 128.

## 2022-06-08 NOTE — TELEPHONE ENCOUNTER
I spoke with Mr. Dallas Mccoy to inform him of Dr. Jose Gomez instruction. He stated his understanding and agreement.

## 2022-06-09 ENCOUNTER — HOSPITAL ENCOUNTER (OUTPATIENT)
Dept: CARDIAC REHAB | Age: 57
Setting detail: RECURRING SERIES
Discharge: HOME | End: 2022-06-12
Payer: COMMERCIAL

## 2022-06-09 PROCEDURE — 94625 PHY/QHP OP PULM RHB W/O MNTR: CPT

## 2022-06-09 ASSESSMENT — EXERCISE STRESS TEST
PEAK_BP: 100/70
PEAK_METS: 2.7
PEAK_HR: 122

## 2022-06-10 DIAGNOSIS — R06.09 DYSPNEA ON EXERTION: Primary | ICD-10-CM

## 2022-06-10 NOTE — PROGRESS NOTES
CT entered from old system. \"          1. Dyspnea on exertion: probably due to multiple causes. There is restriction which is likely  post-COVID and a history of COPD with some mild emphysema and moderate airflow obstruction. R06.00  786.09  albuterol (PROVENTIL HFA, VENTOLIN HFA, PROAIR HFA) 90 mcg/actuation inhaler     2. Restrictive lung disease : recommend pulmonary rehab and follow up imaging in July. J98.4  518.89  CT CHEST HIGH RES WO CONT     3. History of COVID-19 : recommend vaccination be maintained. May need POC oxygen.     Z86.16  V12.09  CT CHEST HIGH RES WO CONT           4.  Stage 2 moderate COPD by GOLD classification (HCC) :  Continue Trelegy and albuterol HFA.    J44.9  496       \"

## 2022-06-13 ENCOUNTER — TELEPHONE (OUTPATIENT)
Dept: FAMILY MEDICINE CLINIC | Facility: CLINIC | Age: 57
End: 2022-06-13

## 2022-06-13 DIAGNOSIS — N52.9 ERECTILE DYSFUNCTION, UNSPECIFIED ERECTILE DYSFUNCTION TYPE: Primary | ICD-10-CM

## 2022-06-13 RX ORDER — SILDENAFIL 50 MG/1
50-100 TABLET, FILM COATED ORAL DAILY PRN
Qty: 30 TABLET | Refills: 2 | Status: SHIPPED | OUTPATIENT
Start: 2022-06-13 | End: 2022-10-06

## 2022-06-13 NOTE — TELEPHONE ENCOUNTER
I spoke with Mr. [place] Garrison. He read the last rx bottle to me, he stated it was from Dr. Naresh Hodgson for sildenafil on 7/13/21 for 30 tabs with 2 refills.

## 2022-06-13 NOTE — TELEPHONE ENCOUNTER
I spoke with Mr. Ford Artist to inform him that Dr. Benjiman Councilman sent in the rx. He stated his understanding and agreement.

## 2022-06-13 NOTE — TELEPHONE ENCOUNTER
Mr. Jaron Robison is requesting a refill of his Viagra, but I don't see it on his med list in either chart. Does he need a visit to discuss?

## 2022-06-13 NOTE — TELEPHONE ENCOUNTER
His ED was noted in 2020, 2 years ago, but I can't find any record of us prescribing him Viagra, or even that it was on his med list, for the past few years. Please confirm with pt what exactly he's been taking & who prescribed it; if it was our office please let him know that on our new system we are missing a lot of information like this unfortunately.  If he's not had it prescribed from our office before then please advise him to schedule a virtual visit to discuss Tx options for ED; tks

## 2022-06-14 ENCOUNTER — APPOINTMENT (OUTPATIENT)
Dept: CARDIAC REHAB | Age: 57
End: 2022-06-14
Payer: COMMERCIAL

## 2022-06-16 ENCOUNTER — HOSPITAL ENCOUNTER (OUTPATIENT)
Dept: CARDIAC REHAB | Age: 57
Setting detail: RECURRING SERIES
End: 2022-06-16
Payer: COMMERCIAL

## 2022-06-16 ENCOUNTER — TELEMEDICINE (OUTPATIENT)
Dept: FAMILY MEDICINE CLINIC | Facility: CLINIC | Age: 57
End: 2022-06-16
Payer: COMMERCIAL

## 2022-06-16 DIAGNOSIS — L25.9 CONTACT DERMATITIS, UNSPECIFIED CONTACT DERMATITIS TYPE, UNSPECIFIED TRIGGER: Primary | ICD-10-CM

## 2022-06-16 PROBLEM — Z79.899 CHRONIC PRESCRIPTION BENZODIAZEPINE USE: Status: ACTIVE | Noted: 2020-12-21

## 2022-06-16 PROCEDURE — 3017F COLORECTAL CA SCREEN DOC REV: CPT | Performed by: FAMILY MEDICINE

## 2022-06-16 PROCEDURE — G8427 DOCREV CUR MEDS BY ELIG CLIN: HCPCS | Performed by: FAMILY MEDICINE

## 2022-06-16 PROCEDURE — 99213 OFFICE O/P EST LOW 20 MIN: CPT | Performed by: FAMILY MEDICINE

## 2022-06-16 RX ORDER — CLOTRIMAZOLE AND BETAMETHASONE DIPROPIONATE 10; .64 MG/G; MG/G
CREAM TOPICAL
Qty: 1 EACH | Refills: 2 | Status: SHIPPED | OUTPATIENT
Start: 2022-06-16 | End: 2022-10-06

## 2022-06-16 NOTE — PROGRESS NOTES
[address]  Phone: [phone]  Fax: [phone]  [email or web address]      Encounter [facility]; Established patient 59 y.o.male; seen 6/16/2022 for: Rash (both hands have red spots, the below pinky on both hand feels like there is a thron)      Assessment & Plan    1. Contact dermatitis, unspecified contact dermatitis type, unspecified trigger  -     clotrimazole-betamethasone (LOTRISONE) 1-0.05 % cream; Apply topically 2 times daily. , Disp-1 each, R-2, Normal    Problem and/or Symptoms are new to provider. Will have patient follow up as directed and make the following plan for further evaluation and/or treatment:    Will Tx with topical steroid/antifugal cream & advised pt to avoid resting his hands on those areas until rash is resolved. Also advised him to clean his steering wheel & desk, or anywhere that he regularly rests his hands, for possible contact irration with some foreign substance. Orders Placed This Encounter    clotrimazole-betamethasone (LOTRISONE) 1-0.05 % cream     Sig: Apply topically 2 times daily. Dispense:  1 each     Refill:  2        Check Out Instructions  Return if symptoms worsen or fail to improve. Subjective & Objective    HPI  Pt has a few weeks of worsening red rash across the medial aspects of B/L palms & pinky fingers. 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 Denies any new detergents, soaps, etc. Mild itch & no pain. Review of Systems   Physical Exam  No flowsheet data found. BP Readings from Last 3 Encounters:   04/28/22 100/60   03/25/22 114/80   03/18/22 (!) 142/80     Wt Readings from Last 3 Encounters:   06/07/22 241 lb (109.3 kg)   05/31/22 240 lb 9.6 oz (109.1 kg)   05/24/22 241 lb (109.3 kg)     There is no height or weight on file to calculate BMI.    TIME    Emily Da Silva was evaluated through a synchronous (real-time) audio and/or video encounter. The patient (or guardian if applicable) is aware that this is a billable service, which includes applicable co-pays. This Virtual Visit was conducted with patient's (and/or legal guardian's) consent. The visit was conducted pursuant to the emergency declaration under the [address] [place] [address], [address] [place] [address] authority and the [employer] Act. Patient identification was verified, and a caregiver was present when appropriate. The patient was located at Home: [address] [place] [id number]. Provider was located at Home ([place] [address]): [place]. An electronic signature was used to authenticate this note.   -- Kymberly Donnelly MD

## 2022-06-21 ENCOUNTER — HOSPITAL ENCOUNTER (OUTPATIENT)
Dept: CARDIAC REHAB | Age: 57
Setting detail: RECURRING SERIES
Discharge: HOME | End: 2022-06-24
Payer: COMMERCIAL

## 2022-06-21 ENCOUNTER — TELEPHONE (OUTPATIENT)
Dept: FAMILY MEDICINE CLINIC | Facility: CLINIC | Age: 57
End: 2022-06-21

## 2022-06-21 VITALS — BODY MASS INDEX: 33.66 KG/M2 | WEIGHT: 248.2 LBS

## 2022-06-21 DIAGNOSIS — I10 HYPERTENSION, UNSPECIFIED TYPE: Primary | ICD-10-CM

## 2022-06-21 PROCEDURE — G0239 OTH RESP PROC, GROUP: HCPCS

## 2022-06-21 RX ORDER — HYDROCHLOROTHIAZIDE 12.5 MG/1
12.5 CAPSULE, GELATIN COATED ORAL EVERY MORNING
Qty: 30 CAPSULE | Refills: 2 | Status: SHIPPED | OUTPATIENT
Start: 2022-06-21 | End: 2022-07-07 | Stop reason: SDUPTHER

## 2022-06-21 ASSESSMENT — EXERCISE STRESS TEST
PEAK_METS: 2.8
PEAK_BP: 98/70
PEAK_HR: 106

## 2022-06-22 NOTE — TELEPHONE ENCOUNTER
I spoke with Mr. Winnie Shah to inform him that Dr. Karly Laurent sent in HCTZ 12.5 mg. I also asked him to keep track of his BP again and watch for dizziness and let us know. He stated his understanding and agreement.

## 2022-06-23 ENCOUNTER — HOSPITAL ENCOUNTER (OUTPATIENT)
Dept: CARDIAC REHAB | Age: 57
Setting detail: RECURRING SERIES
End: 2022-06-23
Payer: COMMERCIAL

## 2022-06-27 ENCOUNTER — PATIENT MESSAGE (OUTPATIENT)
Dept: FAMILY MEDICINE CLINIC | Facility: CLINIC | Age: 57
End: 2022-06-27

## 2022-06-27 DIAGNOSIS — Z79.899 CHRONIC PRESCRIPTION BENZODIAZEPINE USE: Primary | ICD-10-CM

## 2022-06-27 DIAGNOSIS — F41.0 PANIC ANXIETY SYNDROME: ICD-10-CM

## 2022-06-28 RX ORDER — ALPRAZOLAM 0.5 MG/1
.5-1 TABLET ORAL DAILY PRN
Qty: 20 TABLET | Refills: 0 | Status: SHIPPED | OUTPATIENT
Start: 2022-06-28 | End: 2022-07-07 | Stop reason: SDUPTHER

## 2022-06-28 NOTE — TELEPHONE ENCOUNTER
From: Rashid Gonzalez  To: Dr. Saira Valler: 6/27/2022 6:04 PM EDT  Subject: Refill Xanax     Good morning Artie Cullen. I'm in need of a new script for my Xanax. It's filled every 2 weeks. Not monthly. Can you please have Dr. Lucita Hay call a new one in for me please. Josh on [employer]. Please. [facility] I don't think is covered with my insurance.      Thank you,  Mickeal Bence  [phone]

## 2022-06-28 NOTE — TELEPHONE ENCOUNTER
Please note that scripts are sent in for a month supply at a time, not every 2 weeks. Pt has a visit scheduled in approx 10 days, so a short term RF approved today to last until next scheduled visit.

## 2022-06-29 DIAGNOSIS — N40.1 BENIGN PROSTATIC HYPERPLASIA WITH INCOMPLETE BLADDER EMPTYING: ICD-10-CM

## 2022-06-29 DIAGNOSIS — R73.02 IGT (IMPAIRED GLUCOSE TOLERANCE): ICD-10-CM

## 2022-06-29 DIAGNOSIS — Z12.5 SCREENING FOR PROSTATE CANCER: ICD-10-CM

## 2022-06-29 DIAGNOSIS — R39.14 BENIGN PROSTATIC HYPERPLASIA WITH INCOMPLETE BLADDER EMPTYING: ICD-10-CM

## 2022-06-29 DIAGNOSIS — E34.9 HYPOTESTOSTERONEMIA: ICD-10-CM

## 2022-06-29 DIAGNOSIS — E03.9 HYPOTHYROIDISM (ACQUIRED): ICD-10-CM

## 2022-06-29 DIAGNOSIS — I10 HYPERTENSION, UNSPECIFIED TYPE: ICD-10-CM

## 2022-06-29 DIAGNOSIS — E78.5 HYPERLIPIDEMIA, UNSPECIFIED HYPERLIPIDEMIA TYPE: ICD-10-CM

## 2022-06-30 ENCOUNTER — HOSPITAL ENCOUNTER (OUTPATIENT)
Dept: CARDIAC REHAB | Age: 57
Setting detail: RECURRING SERIES
End: 2022-06-30
Payer: COMMERCIAL

## 2022-07-05 ENCOUNTER — HOSPITAL ENCOUNTER (OUTPATIENT)
Dept: CARDIAC REHAB | Age: 57
Setting detail: RECURRING SERIES
End: 2022-07-05
Payer: COMMERCIAL

## 2022-07-07 ENCOUNTER — TELEMEDICINE (OUTPATIENT)
Dept: FAMILY MEDICINE CLINIC | Facility: CLINIC | Age: 57
End: 2022-07-07
Payer: COMMERCIAL

## 2022-07-07 ENCOUNTER — HOSPITAL ENCOUNTER (OUTPATIENT)
Dept: CARDIAC REHAB | Age: 57
Setting detail: RECURRING SERIES
Discharge: HOME | End: 2022-07-10
Payer: COMMERCIAL

## 2022-07-07 VITALS — BODY MASS INDEX: 34.15 KG/M2 | WEIGHT: 251.8 LBS

## 2022-07-07 DIAGNOSIS — K21.9 GASTROESOPHAGEAL REFLUX DISEASE, UNSPECIFIED WHETHER ESOPHAGITIS PRESENT: ICD-10-CM

## 2022-07-07 DIAGNOSIS — G89.29 CHRONIC BACK PAIN, UNSPECIFIED BACK LOCATION, UNSPECIFIED BACK PAIN LATERALITY: ICD-10-CM

## 2022-07-07 DIAGNOSIS — E03.9 HYPOTHYROIDISM (ACQUIRED): ICD-10-CM

## 2022-07-07 DIAGNOSIS — Z00.00 ANNUAL PHYSICAL EXAM: ICD-10-CM

## 2022-07-07 DIAGNOSIS — I10 HYPERTENSION, UNSPECIFIED TYPE: ICD-10-CM

## 2022-07-07 DIAGNOSIS — F33.9 RECURRENT MAJOR DEPRESSIVE DISORDER, REMISSION STATUS UNSPECIFIED (HCC): ICD-10-CM

## 2022-07-07 DIAGNOSIS — N40.0 BENIGN PROSTATIC HYPERPLASIA, UNSPECIFIED WHETHER LOWER URINARY TRACT SYMPTOMS PRESENT: Primary | ICD-10-CM

## 2022-07-07 DIAGNOSIS — F41.0 PANIC ANXIETY SYNDROME: ICD-10-CM

## 2022-07-07 DIAGNOSIS — E78.5 HYPERLIPIDEMIA, UNSPECIFIED HYPERLIPIDEMIA TYPE: ICD-10-CM

## 2022-07-07 DIAGNOSIS — G47.00 INSOMNIA, UNSPECIFIED TYPE: ICD-10-CM

## 2022-07-07 DIAGNOSIS — Z12.5 SCREENING FOR PROSTATE CANCER: ICD-10-CM

## 2022-07-07 DIAGNOSIS — R73.02 IGT (IMPAIRED GLUCOSE TOLERANCE): ICD-10-CM

## 2022-07-07 DIAGNOSIS — E34.9 HYPOTESTOSTERONEMIA: ICD-10-CM

## 2022-07-07 DIAGNOSIS — Z79.899 CHRONIC PRESCRIPTION BENZODIAZEPINE USE: ICD-10-CM

## 2022-07-07 DIAGNOSIS — M54.9 CHRONIC BACK PAIN, UNSPECIFIED BACK LOCATION, UNSPECIFIED BACK PAIN LATERALITY: ICD-10-CM

## 2022-07-07 PROBLEM — F32.9 MDD (MAJOR DEPRESSIVE DISORDER): Status: ACTIVE | Noted: 2022-07-07

## 2022-07-07 PROCEDURE — 3017F COLORECTAL CA SCREEN DOC REV: CPT | Performed by: FAMILY MEDICINE

## 2022-07-07 PROCEDURE — G8427 DOCREV CUR MEDS BY ELIG CLIN: HCPCS | Performed by: FAMILY MEDICINE

## 2022-07-07 PROCEDURE — 94625 PHY/QHP OP PULM RHB W/O MNTR: CPT

## 2022-07-07 PROCEDURE — 99214 OFFICE O/P EST MOD 30 MIN: CPT | Performed by: FAMILY MEDICINE

## 2022-07-07 RX ORDER — PANTOPRAZOLE SODIUM 40 MG/1
40 TABLET, DELAYED RELEASE ORAL
Qty: 180 TABLET | Refills: 1 | Status: SHIPPED | OUTPATIENT
Start: 2022-07-07 | End: 2022-10-24 | Stop reason: SDUPTHER

## 2022-07-07 RX ORDER — TESTOSTERONE CYPIONATE 200 MG/ML
200 INJECTION INTRAMUSCULAR
Qty: 3 ML | Refills: 5 | Status: SHIPPED | OUTPATIENT
Start: 2022-07-07 | End: 2022-07-22 | Stop reason: SDUPTHER

## 2022-07-07 RX ORDER — FAMOTIDINE 40 MG/1
40 TABLET, FILM COATED ORAL 2 TIMES DAILY PRN
Qty: 180 TABLET | Refills: 1 | Status: SHIPPED | OUTPATIENT
Start: 2022-07-07 | End: 2022-10-24 | Stop reason: SDUPTHER

## 2022-07-07 RX ORDER — TAMSULOSIN HYDROCHLORIDE 0.4 MG/1
0.4 CAPSULE ORAL DAILY
Qty: 90 CAPSULE | Refills: 1 | Status: ON HOLD | OUTPATIENT
Start: 2022-07-07 | End: 2022-10-09 | Stop reason: HOSPADM

## 2022-07-07 RX ORDER — ATORVASTATIN CALCIUM 40 MG/1
40 TABLET, FILM COATED ORAL DAILY
Qty: 90 TABLET | Refills: 1 | Status: SHIPPED | OUTPATIENT
Start: 2022-07-07 | End: 2022-10-24 | Stop reason: SDUPTHER

## 2022-07-07 RX ORDER — ZOLPIDEM TARTRATE 10 MG/1
10 TABLET ORAL EVERY EVENING
Qty: 30 TABLET | Refills: 5 | Status: SHIPPED | OUTPATIENT
Start: 2022-07-07 | End: 2022-10-24 | Stop reason: SDUPTHER

## 2022-07-07 RX ORDER — ALPRAZOLAM 0.5 MG/1
.5-1 TABLET ORAL DAILY PRN
Qty: 30 TABLET | Refills: 5 | Status: SHIPPED | OUTPATIENT
Start: 2022-07-07 | End: 2022-09-21 | Stop reason: SDUPTHER

## 2022-07-07 RX ORDER — LEVOTHYROXINE SODIUM 0.05 MG/1
50 TABLET ORAL DAILY
Qty: 90 TABLET | Refills: 1 | Status: SHIPPED | OUTPATIENT
Start: 2022-07-07 | End: 2022-07-22 | Stop reason: SDUPTHER

## 2022-07-07 RX ORDER — HYDROCHLOROTHIAZIDE 12.5 MG/1
12.5 CAPSULE, GELATIN COATED ORAL EVERY MORNING
Qty: 90 CAPSULE | Refills: 1 | Status: SHIPPED | OUTPATIENT
Start: 2022-07-07 | End: 2022-10-06

## 2022-07-07 RX ORDER — BUPROPION HYDROCHLORIDE 300 MG/1
300 TABLET ORAL EVERY MORNING
Qty: 90 TABLET | Refills: 1 | Status: SHIPPED | OUTPATIENT
Start: 2022-07-07 | End: 2022-10-06

## 2022-07-07 ASSESSMENT — EXERCISE STRESS TEST
PEAK_BP: 120/70
PEAK_HR: 116
PEAK_METS: 2.8

## 2022-07-07 NOTE — PROGRESS NOTES
[address]  Phone: [phone]  Fax: [phone]  [email or web address]      Encounter [facility] [address]; Established patient 59 y.o.male; seen 7/7/2022 for: Anxiety, Other (hypotestosteronemia), and Cholesterol Problem      Assessment & Plan    1. Benign prostatic hyperplasia, unspecified whether lower urinary tract symptoms present  -     tamsulosin (FLOMAX) 0.4 MG capsule; Take 1 capsule by mouth daily, Disp-90 capsule, R-1Normal  2. Chronic prescription benzodiazepine use  -     ALPRAZolam (XANAX) 0.5 MG tablet; Take 1-2 tablets by mouth daily as needed for Anxiety. , Disp-30 tablet, R-5Normal  3. Panic anxiety syndrome  -     ALPRAZolam (XANAX) 0.5 MG tablet; Take 1-2 tablets by mouth daily as needed for Anxiety. , Disp-30 tablet, R-5Normal  4. Hypertension, unspecified type  -     hydroCHLOROthiazide (MICROZIDE) 12.5 MG capsule; Take 1 capsule by mouth every morning, Disp-90 capsule, R-1Normal  5. Chronic back pain, unspecified back location, unspecified back pain laterality  6. Hyperlipidemia, unspecified hyperlipidemia type  -     atorvastatin (LIPITOR) 40 MG tablet; Take 1 tablet by mouth daily, Disp-90 tablet, R-1Normal  7. Hypotestosteronemia  -     testosterone cypionate (DEPOTESTOTERONE CYPIONATE) 200 MG/ML injection; Inject 1 mL into the muscle every 14 days. , Disp-3 mL, R-5Normal  8. Hypothyroidism (acquired)  -     levothyroxine (SYNTHROID) 50 MCG tablet; Take 1 tablet by mouth daily, Disp-90 tablet, R-1Normal  9. Insomnia, unspecified type  -     zolpidem (AMBIEN) 10 MG tablet; Take 1 tablet by mouth every evening., Disp-30 tablet, R-5Normal  10. Gastroesophageal reflux disease, unspecified whether esophagitis present  -     pantoprazole (PROTONIX) 40 MG tablet; Take 1 tablet by mouth 2 times daily (before meals), Disp-180 tablet, R-1Normal  -     famotidine (PEPCID) 40 MG tablet;  Take 1 tablet by mouth 2 times daily as needed (heartburn), Disp-180 tablet, R-1Normal  11. Recurrent major depressive disorder, remission status unspecified (HCC)  -     buPROPion (WELLBUTRIN XL) 300 MG extended release tablet; Take 1 tablet by mouth every morning, Disp-90 tablet, R-1Normal    Problem and/or Symptoms are currently stable and/or improving on current treatment plan. Will continue and have patient follow up as directed. Pertinent labs pending & pertinent meds refilled X 6 M      Check Out Instructions  Return in about 6 months (around 1/10/2023) for Physical, Previsit Labs. Subjective & Objective    HPI  Patient states that chronic health problems are stable on current regimens and has no acute concerns today except as mentioned. Review of Systems   Physical Exam  No flowsheet data found. BP Readings from Last 3 Encounters:   04/28/22 100/60   03/25/22 114/80   03/18/22 (!) 142/80     Wt Readings from Last 3 Encounters:   07/07/22 251 lb 12.8 oz (114.2 kg)   06/21/22 248 lb 3.2 oz (112.6 kg)   06/07/22 241 lb (109.3 kg)     There is no height or weight on file to calculate BMI. We discussed the typical prognosis and potential complications of the concern(s), including treatment options. Medication risks, benefits, costs, interactions, and alternatives were discussed as appropriate. I advised him to contact the office if his condition worsens or fails to improve as anticipated. He expressed understanding with the discussion and plan of care. Naresh Dodson, was evaluated through a synchronous (real-time) audio and/or video encounter. The patient (or guardian if applicable) is aware that this is a billable service, which includes applicable co-pays. This Virtual Visit was conducted with patient's (and/or legal guardian's) consent.  The visit was conducted pursuant to the emergency declaration under the [facility] [address] waiver authority and the [employer]ropriations Act. Patient identification was verified, and a caregiver was present when appropriate. The patient was located at Home: [address] [place] [address] [place] [phone]. Provider was located at [facility] (Appt Dept): [address] [address] [facility]. An electronic signature was used to authenticate this note.   -- Radha Blum MD

## 2022-07-10 NOTE — PROGRESS NOTES
[address] [facility], [address] [facility]  [phone]    Patient ID:  Name: Rene Shelton  MRN: [id number]  AGE: 59 y.o.  : 1965          Office Visit 2022    CHIEF COMPLAINT:    Chief Complaint   Patient presents with    Sleep Study    Results    New Patient       HISTORY OF PRESENT ILLNESS:    Pt is a 59 y.o. male  with a history of JORGE, COPD, COVID long hauler, insomnia, anxiety, depression, and HLD. . Pt had a PSG/HST on 22 with an AHI of 1.8/hr with desaturations to 85%. Pt is seen today for new patient evaluation. Pt reports that he has been on CPAP in the past. He has not been on CPAP in years. He brought 3 different CPAPs in a suitcase with him today but at least one of them was his wife's CPAP. Regardless, he has not used a CPAP for years. More recently, his wife has noticed increased witnessed apneic events while he is sleeping. He does snore. He is tired all of the time. This has been worse since having had COVID. He reports that previously, he had a negative home sleep study and then had to have a sleep study in the lab to prove that he has JORGE. He did not sleep much at all during the night of his HST. He knows that he was awake the majority of the night. We reviewed the results. He is aware that he has nocturnal hypoxemia and he would benefit from a split night PSG. Pt is agreeable.          ALLERGIES: No Known Allergies       Past Medical History:   Past Medical History:   Diagnosis Date    Aneurysm ([address]     Anxiety     Arrhythmia     Arthritis     Back pain     CAD (coronary artery disease)     Followed by [facility], Dr. Monika Manriquez, Nitroglycerin PRN-last used 2021    Chronic obstructive pulmonary disease ([address]     Patient does not have any inhalers at this time, and would like to switch pulmonologist      Chronic pain     Depression     GERD (gastroesophageal reflux disease)     managed with medication     H/O echocardiogram 2020    EF 60-65%    Hypercholesterolemia     managed with medication     Hypertension     managed with medication     Hypothyroidism     managed with medication     MI (myocardial infarction) ([facility]     2 stents placed at that time, daily 81 mg ASA, followed by Dr. Yuliana Barkley Nausea & vomiting     JORGE on CPAP     Thyroid disease          Problem List:   Patient Active Problem List   Diagnosis   [facility] maintenance    CAD in native artery    HTN (hypertension)    Chronic back pain    COPD (chronic obstructive pulmonary disease) ([facility]    Hypothyroidism (acquired)    GERD (gastroesophageal reflux disease)    Panic anxiety syndrome    History of tobacco use    Annual physical exam    Insomnia    Chronic prescription benzodiazepine use    OA (osteoarthritis)    S/P lumbar spine operation    Allergic conjunctivitis    Palpitations    Hypotestosteronemia    ED (erectile dysfunction)    JORGE (obstructive sleep apnea)    IGT (impaired glucose tolerance)    Post-COVID-19 syndrome    BPH (benign prostatic hyperplasia)    [facility] dependence [facility]    HLD (hyperlipidemia)    MDD (major depressive disorder)           Surgical History:   Past Surgical History:   Procedure Laterality Date    CARDIAC CATHETERIZATION  2021    GI  2021    EGD     LUMBAR FUSION      ORTHOPEDIC SURGERY  1984    elbow infusion    PTCA  2008    stents x 2    TOTAL HIP ARTHROPLASTY Left 2021    total       Pulmonary Studies: No flowsheet data found.       Social History:   Social History     Socioeconomic History    Marital status:      Spouse name: Not on file    Number of children: Not on file    Years of education: Not on file    Highest education level: Not on file   Occupational History    Not on file   Tobacco Use    Smoking status: Former Smoker     Packs/day: 1.50     Quit date: 2008     Years since quittin.5    Smokeless injection Inject 1 mL into the muscle every 14 days.  atorvastatin (LIPITOR) 40 MG tablet Take 1 tablet by mouth daily    levothyroxine (SYNTHROID) 50 MCG tablet Take 1 tablet by mouth daily    hydroCHLOROthiazide (MICROZIDE) 12.5 MG capsule Take 1 capsule by mouth every morning (Patient taking differently: Take 12.5 mg by mouth every morning 1/2 po daily)    buPROPion (WELLBUTRIN XL) 300 MG extended release tablet Take 1 tablet by mouth every morning    pantoprazole (PROTONIX) 40 MG tablet Take 1 tablet by mouth 2 times daily (before meals)    famotidine (PEPCID) 40 MG tablet Take 1 tablet by mouth 2 times daily as needed (heartburn)    tamsulosin (FLOMAX) 0.4 MG capsule Take 1 capsule by mouth daily    zolpidem (AMBIEN) 10 MG tablet Take 1 tablet by mouth every evening.  clotrimazole-betamethasone (LOTRISONE) 1-0.05 % cream Apply topically 2 times daily.  sildenafil (VIAGRA) 50 MG tablet Take 1-2 tablets by mouth daily as needed for Erectile Dysfunction    OXYGEN 2 lpm cont    acetaminophen (TYLENOL) 500 MG tablet Take 1,000 mg by mouth every 6 hours as needed    albuterol sulfate  (90 Base) MCG/ACT inhaler Inhale 2 puffs into the lungs every 4 hours as needed    albuterol (PROVENTIL) (2.5 MG/3ML) 0.083% nebulizer solution Inhale 2.5 mg into the lungs every 4 hours as needed    aspirin 81 MG EC tablet Take 81 mg by mouth 2 times daily    methadone (DOLOPHINE) 5 MG tablet TAKE 1 TABLET BY MOUTH TWICE A DAY MUST LAST 30 DAYS    naproxen sodium (ANAPROX) 220 MG tablet Take 220 mg by mouth 2 times daily (with meals)    nitroGLYCERIN (NITROSTAT) 0.4 MG SL tablet Place 0.4 mg under the tongue as needed    ondansetron (ZOFRAN-ODT) 8 MG TBDP disintegrating tablet Take 8 mg by mouth every 8 hours as needed     No current facility-administered medications for this visit.            REVIEW OF SYSTEMS:      Review of Systems - General ROS: positive for  - fatigue  Psychological ROS: positive for - anxiety and depression  Ophthalmic ROS: positive for - uses glasses  ENT ROS: positive for - snoring  Allergy and Immunology ROS: positive for - nasal congestion and seasonal allergies  Hematological and Lymphatic ROS: negative  Endocrine ROS: negative  Respiratory ROS: positive for - cough, shortness of breath and wheezing  Cardiovascular ROS: no chest pain or dyspnea on exertion  Gastrointestinal ROS: positive for - heartburn  Genito-Urinary ROS: positive for - nocturia  Musculoskeletal ROS: positive for - joint pain and muscular weakness  Neurological ROS: no TIA or stroke symptoms  Dermatological ROS: negative    PHYSICAL EXAM:    Vitals:    07/11/22 1152   BP: 130/84   Pulse: (!) 109   Resp: 15   Temp: 97.2 °F (36.2 °C)   SpO2: 95%       Physical Exam:  GENERAL APPEARANCE:   The patient is obese and in no respiratory distress, on RA. HEENT:   PERRL. Conjunctivae unremarkable. Nasal mucosa is without epistaxis, exudate, or polyps. Gums and dentition are unremarkable. NECK/LYMPHATIC:   Symmetrical with no elevation of jugular venous pulsation. Trachea midline. No thyroid enlargement. No cervical adenopathy. LUNGS:   Normal respiratory effort with symmetrical lung expansion. Breath sounds distant, no wheezing. HEART:   There is a regular rate and rhythm. No murmur, rub, or gallop. There is no edema in the lower extremities. ABDOMEN:   Soft and non-tender. No hepatosplenomegaly. Bowel sounds are normal.     NEURO:   The patient is alert and oriented to person, place, and time. Memory appears intact and mood is normal.  No gross sensorimotor deficits are present. ASSESSMENT:         Diagnosis Orders   1. JORGE (obstructive sleep apnea) -The pathophysiology of obstructive sleep apnea was reviewed with the patient. It's potential to promote severe neurologic, cardiac, pulmonary, and gastrointestinal problems was discussed.  Specifically, the increased incidence of hypertension, coronary artery disease, congestive heart failure, pulmonary hypertension, gastroesophageal reflux, pathologic hypersomnolence, memory loss, and glucose intolerance was related to the consequences of hypoxemia, hypercapnia, airway obstruction, and sympathetic overdrive. We also discussed the ability of nasal CPAP to correct these abnormalities through maintenance of a patent airway. Pt has previously been on PAP therapy but has been off of CPAP for years. He is having worsening issues since having had COVID. His HST was negative but pt felt like he didn't sleep well at all. I will order an in lab split night PSG to evaluate further. Ambulatory Referral to Sleep Studies   2. Nocturnal hypoxemia -pt desats to 85%. Check in lab split night PSG           Medical Decision Making:       Orders:   Orders Placed This Encounter   Procedures    Ambulatory Referral to Sleep Studies        Dr. Eric Rock was the onsite physician. He was available to answer any questions.      Plan:  Follow up in the sleep center in 6 months    Time spent in preparation, chart review, imaging review and direct patient care was 32 minutes         ELIZABETH Julien  7/11/2022,    Electronically signed

## 2022-07-11 ENCOUNTER — OFFICE VISIT (OUTPATIENT)
Dept: SLEEP MEDICINE | Age: 57
End: 2022-07-11
Payer: COMMERCIAL

## 2022-07-11 ENCOUNTER — TELEPHONE (OUTPATIENT)
Dept: FAMILY MEDICINE CLINIC | Facility: CLINIC | Age: 57
End: 2022-07-11

## 2022-07-11 VITALS
WEIGHT: 256.1 LBS | OXYGEN SATURATION: 95 % | TEMPERATURE: 97.2 F | BODY MASS INDEX: 33.94 KG/M2 | HEART RATE: 109 BPM | RESPIRATION RATE: 15 BRPM | SYSTOLIC BLOOD PRESSURE: 130 MMHG | HEIGHT: 73 IN | DIASTOLIC BLOOD PRESSURE: 84 MMHG

## 2022-07-11 DIAGNOSIS — G47.34 NOCTURNAL HYPOXEMIA: ICD-10-CM

## 2022-07-11 DIAGNOSIS — G47.33 OSA (OBSTRUCTIVE SLEEP APNEA): Primary | ICD-10-CM

## 2022-07-11 PROCEDURE — G8427 DOCREV CUR MEDS BY ELIG CLIN: HCPCS | Performed by: PHYSICIAN ASSISTANT

## 2022-07-11 PROCEDURE — G8417 CALC BMI ABV UP PARAM F/U: HCPCS | Performed by: PHYSICIAN ASSISTANT

## 2022-07-11 PROCEDURE — 3017F COLORECTAL CA SCREEN DOC REV: CPT | Performed by: PHYSICIAN ASSISTANT

## 2022-07-11 PROCEDURE — 1036F TOBACCO NON-USER: CPT | Performed by: PHYSICIAN ASSISTANT

## 2022-07-11 PROCEDURE — 99203 OFFICE O/P NEW LOW 30 MIN: CPT | Performed by: PHYSICIAN ASSISTANT

## 2022-07-11 ASSESSMENT — SLEEP AND FATIGUE QUESTIONNAIRES
HOW LIKELY ARE YOU TO NOD OFF OR FALL ASLEEP WHILE SITTING AND READING: 1
HOW LIKELY ARE YOU TO NOD OFF OR FALL ASLEEP IN A CAR, WHILE STOPPED FOR A FEW MINUTES IN TRAFFIC: 0
HOW LIKELY ARE YOU TO NOD OFF OR FALL ASLEEP WHILE SITTING INACTIVE IN A PUBLIC PLACE: 0
HOW LIKELY ARE YOU TO NOD OFF OR FALL ASLEEP WHILE SITTING AND TALKING TO SOMEONE: 0
ESS TOTAL SCORE: 2
HOW LIKELY ARE YOU TO NOD OFF OR FALL ASLEEP WHILE WATCHING TV: 0
HOW LIKELY ARE YOU TO NOD OFF OR FALL ASLEEP WHILE SITTING QUIETLY AFTER LUNCH WITHOUT ALCOHOL: 0
HOW LIKELY ARE YOU TO NOD OFF OR FALL ASLEEP WHEN YOU ARE A PASSENGER IN A CAR FOR AN HOUR WITHOUT A BREAK: 0
HOW LIKELY ARE YOU TO NOD OFF OR FALL ASLEEP WHILE LYING DOWN TO REST IN THE AFTERNOON WHEN CIRCUMSTANCES PERMIT: 1

## 2022-07-11 NOTE — TELEPHONE ENCOUNTER
Eloy Benavides from [facility] [employer] [facility] called requesting a call back to clarify Mr. Shea Harness work restrictions.  I have tried calling several times and get a recording that says \"call cannot be completed at this time, try your call again later\"

## 2022-07-11 NOTE — PATIENT INSTRUCTIONS
Patient Education        Sleep Studies: About This Test  What is it? Sleep studies are tests that watch what happens to your body during sleep. These studies usually are done in a sleep lab. Sleep labs are often located in hospitals. Sleep studies you do at home can be done with portable equipment. But they may not give the same results as a sleep lab. Why is this test done? Sleep studies may be done if your sleep is not restful or if you are tired sunny. These studies can help find sleep problems, such as:   Sleep apnea.  Excessive snoring.  Problems staying awake, such as narcolepsy.  Problems with nighttime behaviors. These include sleepwalking, night terrors, bed-wetting, and REM behavior disorders (RBD).  Conditions such as periodic limb movement disorder. This is repeated muscle twitching of the feet, arms, or legs during sleep.  Seizures that occur at night (nocturnal seizures).  Ongoing problems that have not responded to treatment. How do you prepare for the test?   You may be asked to keep a sleep diary before your sleep study.  Don't take any naps on the day of your test.   You may be asked to avoid food or drinks with caffeine during the afternoon and evening before your test.   If the sleep study will be done in a sleep lab, you will be asked to shower or bathe before your test. Don't use sprays, oils, or gels on your hair. Don't wear makeup, fingernail polish, or fake nails. Take a small overnight bag with personal items, such as a toothbrush, a comb, favorite pillows or blankets, and a book. You can wear your own nightclothes.  If you will have portable sleep monitoring, your doctor will explain how to use the equipment at home. How is the test done?  In the sleep lab, you will be in a private room, much like a hotel room.  Small pads or patches called electrodes will be placed on your head and body with a small amount of glue and tape.  These will record things like brain activity, eye movement, oxygen levels, and snoring.  Soft elastic belts will be placed around your chest and belly to measure your breathing.  Your blood oxygen levels will be checked by a small clip (oximeter) placed either on the tip of your index finger or on your earlobe.  If you have sleep apnea, you may wear a mask that is connected to a continuous positive airway pressure (CPAP) machine.  Depending on the type of test, you will be allowed to sleep through the night or you'll be awakened periodically and asked to stay awake for a while.  If you use portable sleep monitoring, follow the instructions your doctor gave you. How long does the test take?  You will stay in the sleep lab overnight. For some tests, you will also stay part of the next day. What happens after the test?   You will be able to go home right away.  You may not sleep well during the test and may be tired the next day.  You can go back to your usual activities.  After your sleep problem has been identified, you may need a second study if your doctor orders treatment such as CPAP. Follow-up care is a key part of your treatment and safety. Be sure to make and go to all appointments, and call your doctor if you are having problems. It's also a good idea to keep a list of the medicines youtake. Ask your doctor when you can expect to have your test results. Where can you learn more? Go to https://First Retailpeidaliaeb.Publimind. org and sign in to your i3 membrane account. Enter X197 in the [employer] box to learn more about \"Sleep Studies: About This Test.\"     If you do not have an account, please click on the \"Sign Up Now\" link. Current as of: March 9, 2022               Content Version: 13.3  © 4564-3831 Healthwise, Incorporated. Care instructions adapted under license by [facility] ([facility]).  If you have questions about a medical condition or this instruction, always ask your healthcare professional. Amanda Solomon, [employer] disclaims any warranty or liability for your use of this information. What Is a Sleep Study? (02:15)  Your health professional recommends that you watch this short online healthvideo. Learn what sleep studies are and why they're done. Purpose:  Explains what sleep studies are and why they're done. Goal:  The user will understand what is done during a sleep study and what the resultscan show. How to watch the video    Scan the QR code   OR Visit the website    https://hwi. se/r/Ohixai8eixa0i   Current as of: March 1, 2022               Content Version: 13.3  © 8419-3470 [employer]. Care instructions adapted under license by [facility] ([facility]). If you have questions about a medical condition or this instruction, always ask your healthcare professional. [address] any warranty or liability for your use of this information.

## 2022-07-12 ENCOUNTER — NURSE ONLY (OUTPATIENT)
Dept: FAMILY MEDICINE CLINIC | Facility: CLINIC | Age: 57
End: 2022-07-12

## 2022-07-12 VITALS
TEMPERATURE: 97.2 F | SYSTOLIC BLOOD PRESSURE: 134 MMHG | DIASTOLIC BLOOD PRESSURE: 86 MMHG | OXYGEN SATURATION: 95 % | RESPIRATION RATE: 18 BRPM | HEART RATE: 86 BPM

## 2022-07-12 DIAGNOSIS — Z01.30 BLOOD PRESSURE CHECK: Primary | ICD-10-CM

## 2022-07-12 LAB
EST. AVERAGE GLUCOSE BLD GHB EST-MCNC: 108 MG/DL
HBA1C MFR BLD: 5.4 % (ref 4.8–5.6)

## 2022-07-13 LAB
ALBUMIN SERPL-MCNC: 3.5 G/DL (ref 3.5–5)
ALBUMIN/GLOB SERPL: 1 {RATIO} (ref 1.2–3.5)
ALP SERPL-CCNC: 97 U/L (ref 50–136)
ALT SERPL-CCNC: 28 U/L (ref 12–65)
ANION GAP SERPL CALC-SCNC: 6 MMOL/L (ref 7–16)
AST SERPL-CCNC: 13 U/L (ref 15–37)
BILIRUB SERPL-MCNC: 0.8 MG/DL (ref 0.2–1.1)
BUN SERPL-MCNC: 11 MG/DL (ref 6–23)
CALCIUM SERPL-MCNC: 9.1 MG/DL (ref 8.3–10.4)
CHLORIDE SERPL-SCNC: 102 MMOL/L (ref 98–107)
CHOLEST SERPL-MCNC: 166 MG/DL
CO2 SERPL-SCNC: 30 MMOL/L (ref 21–32)
CREAT SERPL-MCNC: 1.1 MG/DL (ref 0.8–1.5)
GLOBULIN SER CALC-MCNC: 3.5 G/DL (ref 2.3–3.5)
GLUCOSE SERPL-MCNC: 137 MG/DL (ref 65–100)
HDLC SERPL-MCNC: 34 MG/DL (ref 40–60)
HDLC SERPL: 4.9 {RATIO}
LDLC SERPL CALC-MCNC: 105.4 MG/DL
POTASSIUM SERPL-SCNC: 3.9 MMOL/L (ref 3.5–5.1)
PROT SERPL-MCNC: 7 G/DL (ref 6.3–8.2)
SODIUM SERPL-SCNC: 138 MMOL/L (ref 138–145)
T4 FREE SERPL-MCNC: 1.2 NG/DL (ref 0.78–1.46)
TRIGL SERPL-MCNC: 133 MG/DL (ref 35–150)
TSH, 3RD GENERATION: 3.97 UIU/ML (ref 0.36–3.74)
VLDLC SERPL CALC-MCNC: 26.6 MG/DL (ref 6–23)

## 2022-07-14 ENCOUNTER — APPOINTMENT (OUTPATIENT)
Dept: CARDIAC REHAB | Age: 57
End: 2022-07-14
Payer: COMMERCIAL

## 2022-07-15 LAB
TESTOST FREE SERPL-MCNC: 1.7 PG/ML (ref 7.2–24)
TESTOST SERPL-MCNC: 136 NG/DL (ref 264–916)

## 2022-07-19 ENCOUNTER — HOSPITAL ENCOUNTER (OUTPATIENT)
Dept: CARDIAC REHAB | Age: 57
Setting detail: RECURRING SERIES
Discharge: HOME | End: 2022-07-22
Payer: COMMERCIAL

## 2022-07-19 PROCEDURE — G0239 OTH RESP PROC, GROUP: HCPCS

## 2022-07-19 ASSESSMENT — EXERCISE STRESS TEST
PEAK_BP: 112/78
PEAK_METS: 2.8
PEAK_HR: 108

## 2022-07-20 ENCOUNTER — TELEPHONE (OUTPATIENT)
Dept: FAMILY MEDICINE CLINIC | Facility: CLINIC | Age: 57
End: 2022-07-20

## 2022-07-20 NOTE — TELEPHONE ENCOUNTER
Could you please call Mr. Daryl Laura to schedule a vv to discuss his lab results?      Thank you!!!

## 2022-07-20 NOTE — TELEPHONE ENCOUNTER
Mr. Sanfordy Ajit called requesting the results of his labs. I gave him the results of his PSA and A1c but he would like to know the results of the rest. He see that several of them of off.

## 2022-07-20 NOTE — TELEPHONE ENCOUNTER
All his lab results are available on [facility], and I have already sent him a [facility] message regarding his lab results? ?    If he still has questions then please advise him to schedule a virtual visit to discuss, since I'm not sure what he's asking about that hasn't already been addressed.

## 2022-07-21 ENCOUNTER — PATIENT MESSAGE (OUTPATIENT)
Dept: FAMILY MEDICINE CLINIC | Facility: CLINIC | Age: 57
End: 2022-07-21

## 2022-07-21 ENCOUNTER — HOSPITAL ENCOUNTER (OUTPATIENT)
Dept: CARDIAC REHAB | Age: 57
Setting detail: RECURRING SERIES
End: 2022-07-21
Payer: COMMERCIAL

## 2022-07-21 ENCOUNTER — OFFICE VISIT (OUTPATIENT)
Dept: PULMONOLOGY | Age: 57
End: 2022-07-21
Payer: COMMERCIAL

## 2022-07-21 VITALS
BODY MASS INDEX: 34.4 KG/M2 | OXYGEN SATURATION: 97 % | SYSTOLIC BLOOD PRESSURE: 100 MMHG | HEART RATE: 117 BPM | HEIGHT: 72 IN | WEIGHT: 254 LBS | TEMPERATURE: 97.1 F | DIASTOLIC BLOOD PRESSURE: 60 MMHG

## 2022-07-21 DIAGNOSIS — U09.9 COVID-19 LONG HAULER: ICD-10-CM

## 2022-07-21 DIAGNOSIS — D75.1 POLYCYTHEMIA: ICD-10-CM

## 2022-07-21 DIAGNOSIS — J98.4 RESTRICTIVE LUNG DISEASE: Primary | ICD-10-CM

## 2022-07-21 DIAGNOSIS — R00.0 TACHYCARDIA: ICD-10-CM

## 2022-07-21 DIAGNOSIS — G47.34 NOCTURNAL HYPOXEMIA: ICD-10-CM

## 2022-07-21 PROBLEM — J44.9 COPD (CHRONIC OBSTRUCTIVE PULMONARY DISEASE) (HCC): Status: RESOLVED | Noted: 2020-07-14 | Resolved: 2022-07-21

## 2022-07-21 PROCEDURE — G8417 CALC BMI ABV UP PARAM F/U: HCPCS | Performed by: INTERNAL MEDICINE

## 2022-07-21 PROCEDURE — 3017F COLORECTAL CA SCREEN DOC REV: CPT | Performed by: INTERNAL MEDICINE

## 2022-07-21 PROCEDURE — 1036F TOBACCO NON-USER: CPT | Performed by: INTERNAL MEDICINE

## 2022-07-21 PROCEDURE — G8427 DOCREV CUR MEDS BY ELIG CLIN: HCPCS | Performed by: INTERNAL MEDICINE

## 2022-07-21 PROCEDURE — 99215 OFFICE O/P EST HI 40 MIN: CPT | Performed by: INTERNAL MEDICINE

## 2022-07-21 RX ORDER — FUROSEMIDE 20 MG/1
20 TABLET ORAL 2 TIMES DAILY
Qty: 30 TABLET | Refills: 11 | Status: SHIPPED | OUTPATIENT
Start: 2022-07-21 | End: 2022-10-24 | Stop reason: SDUPTHER

## 2022-07-21 ASSESSMENT — ENCOUNTER SYMPTOMS
WHEEZING: 1
SHORTNESS OF BREATH: 1
COUGH: 1

## 2022-07-21 NOTE — PATIENT INSTRUCTIONS
I'd like to check a morning cortisol level at the lab on a different day. Please get complete pulmonary function testing done as soon as you can. There are some online exercise resources at resphealth.org. There is a section called pulmonary rehab at home you can check out. We will check an epo level to try to understand the high blood counts.  lasix at the pharmacy and use if you are swelling in your legs or if your weight jumps up more than 2lbs in 24h.      --------------------------------------------------------  CT of the chest should be done sometime this month or early august--    You can get your CT of the chest done at [facility] locations or at [facility] locations. Sometimes there is a lower copay at [facility]. [facility] \[facility]\"") Rad Department Scheduling  [phone]  Locations:  [address] [place] [address] or [address] [place] [address]      ------------------------------------------------------------------------  [facility]  [phone]    [place] Location  [address] [place] [address], [address] [place] [address], [address] [facility], [address]    You will need a copy of your order to schedule with [facility]. When your CT has been done, we recommend you call us if you haven't heard about your results within a week.

## 2022-07-21 NOTE — PROGRESS NOTES
Dr. Claire Hughes MD  [address] [place]. [address], [address] [facility]  [phone]    Patient Name:  Ijeoma Vaughan  YOB: 1965  Office Visit: 7/21/2022    ASSESSMENT AND PLAN:  (Medical Decision Making)  Ijeoma Vaughan is a 59 y.o. male with persistent dyspnea with functional class 4 symptoms of dyspnea after COVID-19 in January 2022. He has no evidence of COPD on PFT but does have restriction combined with some mild apical emphysema on CT. He needs more recent PFTs to assess the restriction and CT to determine if postinflammatory change persists. 1. Restrictive lung disease  Will review complete PFT and high-resolution CT to confirm that there are no signs of progressive fibrosis. Patient will continue pulmonary rehab program and supplemental oxygen. -     Erythropoietin; Future    2. COVID-19 long hauler  Has persistent tachycardia without evidence of A. fib per cardiology. 3. Polycythemia  Despite supplemental oxygen. Will check EPO level, JAK2. He is symptomatic with erythromelalgia. Last hemoglobin is 15.9  -     JAK2 Gene, V617F Mutation, Qualitative; Future    4. Nocturnal hypoxemia  Continue supplemental oxygen with sleep and review upcoming in lab sleep study. 5. Tachycardia  Now has edema and will offer prn lasix for this. Joshua Perez MD  Electronically signed    Clinical time for encounter was 55 minutes. _____________________________________________________________________________________________________________________    Reason for Visit:  Follow up dyspnea    History of Present Illness:     Ijeoma Vaughan Is a 59 y.o. male with Ijeoma Vaughan is a 60 y.o.  gentleman with h/o COPD, prior 37+pkyr smoking (quit 2008), AAA, chronic pain, low testosterone, BPH who was last seen at [facility] by Dr. Kishor Sidhu virtually on February 1, 2022.   In January 18, 2022 his CT of the chest revealed bilateral groundglass opacities consistent  with this diagnosis of Covid. He did receive monoclonal antibody therapy. At the time of his appointment in February, he was extremely short of breath and at times his oxygen saturation dropped to 85% (from home on his oximeter.)  It has been difficult  documenting exertional hypoxemia in the office but he did desaturate with sleep. He had a home sleep test that was nondiagnostic for sleep apnea but has been scheduled for an in lab sleep study next week. He is now on oxygen at night. He continues to have polycythemia. He feels lightheaded frequently and often has erythromelalgia. He describes his symptoms of shortness of breath as severe, describing functional class IV symptoms including lightheadedness (no syncope), shortness of breath at rest.  He does not see how he could possibly perform his duties at work with the severity of dyspnea he experiences. Ellipta inhaler caused urinary retention and he discontinued. He did not seem to be too helpful with regard to his dyspnea anyway. He has an appointment with urology later this week. Weight was down to 245 over the weekend and came up 5lbs in 2 days. He also developed worsening edema. His tachycardia persists and is present today, though his rhythm is regular      Memory problems continue. He has been arranged for follow up neuropsychiatric evaluation. Tobacco Use: Medium Risk    Smoking Tobacco Use: Former    Smokeless Tobacco Use: Never         Review of Systems:  Review of Systems   Constitutional:  Positive for fatigue. Respiratory:  Positive for cough, shortness of breath and wheezing. Cardiovascular:  Positive for leg swelling. Neurological:  Positive for dizziness. Physical exam:    Vitals:    07/21/22 0814   BP: 100/60   Pulse: (!) 117   Temp: 97.1 °F (36.2 °C)   TempSrc: Temporal   SpO2: 97%   Weight: 254 lb (115.2 kg)   Height: 6' (1.829 m)       Body mass index is 34.45 kg/m². General Appearance:   The patient is pleasnt and in no respiratory distress. HEENT: PERRLA. Conjunctivae unremarkable. Nasal mucosa is without epistaxis, exudate, or polyps. Gums and dentition are unremarkable. There is no oropharyngeal narrowing. TMs are clear. Neck/Lymphatic:  Symmetrical with no elevation of jugular venous pulsation. Trachea midline. No thyroid enlargement. No cervical adenopathy. Lungs:  Normal respiratory effort with symmetrical lung expansion. Breath sounds diminished. Heart:  tachy, reg Care  Abdomen:  Soft and non-tender. No hepatosplenomegaly. Bowel sounds are normal.    Extremity:  No edema, clubbing or cyanosis  Neuro: The patient is alert and oriented to person, place, and time. Memory appears intact and mood is normal.  No gross sensorimotor deficits are present. DIAGNOSTIC TESTS:                                                                                                             Spirometry:   Date    12/21/2020   FVC    3.89 (74%)   FEV1    3.04 (75%)   FEV1/FVC    0.78   FEF 25-75%       Bronchodilator Response       TLC    4.89 (65%)   RV    1.05 (45%)   DLCO    23.4 (61%)   No flowsheet data found. TRANSTHORACIC ECHOCARDIOGRAM (TTE) COMPLETE (CONTRAST/BUBBLE/3D PRN) 03/20/2022  7:20 PM (Final)    Narrative  This is a summary report. The complete report is available in the patient's medical record. If you cannot access the medical record, please contact the sending organization for a detailed fax or copy. Left Ventricle: Left ventricle size is normal. Mildly increased wall thickness. Normal left ventricular systolic function with a visually estimated EF of 55 - 60%. Normal diastolic function.     Signed by: Claire Rodriguez MD on 3/20/2022  7:20 PM             6MWT   2/4/2022        distance   300 m        Start;end O2 sat   98%; 95%        Max MARICHUY dyspnea   8        COMMENTS       Labs:   Lab Results   Component Value Date/Time     07/12/2022 09:53 AM    K 3.9 07/12/2022 09:53 AM     07/12/2022 09:53 AM    CO2 30 07/12/2022 09:53 AM    BUN 11 07/12/2022 09:53 AM    CREATININE 1.10 07/12/2022 09:53 AM    GLUCOSE 137 07/12/2022 09:53 AM    CALCIUM 9.1 07/12/2022 09:53 AM      Lab Results   Component Value Date    WBC 3.5 (L) 01/23/2022    HGB 17.1 01/23/2022    HCT 52.9 (H) 01/23/2022    MCV 86.6 01/23/2022     (L) 01/23/2022     No results found for: DDIMER  Lab Results   Component Value Date/Time    ALKPHOS 97 07/12/2022 09:53 AM    ALKPHOS 97 01/23/2022 09:22 AM    ALT 28 07/12/2022 09:53 AM    AST 13 07/12/2022 09:53 AM    PROT 7.0 07/12/2022 09:53 AM    BILITOT 0.8 07/12/2022 09:53 AM    BILIDIR 0.1 05/13/2021 05:56 AM    LABALBU 3.5 07/12/2022 09:53 AM     Lab Results   Component Value Date    BNP 54 11/21/2021     No results for input(s): PHAPOC, LFH4UFRX, DF3JTOJ, IIV9TZP, BE in the last 72 hours. Imaging:  CXR:   XR CHEST (2 VW) 11/21/2021    Narrative  Two view chest    History: ER order-SOB, left-sided chest pain, anxiety; onset around 0400 today    Comparison: 05/12/2021    Findings: The heart is normal in size and configuration. The lungs and pleural  spaces are clear. The pulmonary vascularity is within normal limits. The  visualized osseous structures are unremarkable. Impression  No active disease in the chest.      CT PE PROTOCOL:   CT CHEST PULMONARY EMBOLISM W CONTRAST 01/23/2022    Narrative  CT OF THE CHEST WITH INTRAVENOUS CONTRAST, 1/23/2022    Indication: Chest pain and shortness of breath beginning last night. Cough. URI  symptoms for 5 days. Covid +5 days ago. .    Comparison: CT chest with contrast 12/11/2021    Technique:   2.5 mm axial scans from above the aortic arch to the lung bases  following the uneventful administration of 70 mL of Isovue-370. Intravenous  contrast was given to evaluate for pulmonary embolism.  All CT scans performed at  this facility use one or all of the following: Automated exposure control,  adjustment of the mA and/or kVp according to patient's size, iterative  reconstruction. Findings: The base of the neck is unremarkable in appearance. No axillary, mediastinal,  or hilar lymphadenopathy is seen. The thoracic aorta is normal in caliber. Opacification of the pulmonary arteries is good. No abnormal filling defects  are seen to suggest pulmonary embolism. Evaluation with lung windows demonstrates mild to moderate multifocal bilateral  lung infiltrates whose appearance is consistent with the history of Covid  pneumonia. No pleural effusion is seen. Lungs are expanded without evidence  for pneumothorax. No acute osseous abnormality is seen. Limited evaluation of the upper abdomen demonstrates no acute abnormality. Impression  1. No evidence for pulmonary embolism. 2.  Mild to moderate multifocal bilateral lung infiltrates whose appearance is  consistent with the history of Covid pneumonia. This report was made using voice transcription. Despite my best efforts to avoid  any, transcription errors may persist. If there is any question about the  accuracy of the report or need for clarification, then please call [phone], or text me through perfectserv for clarification or correction.       Past Medical History:   Diagnosis Date    Aneurysm ([address]     Anxiety     Arrhythmia     Arthritis     Back pain     CAD (coronary artery disease)     Followed by [facility], Dr. Geraldine Melchor, Nitroglycerin PRN-last used 5/2021    Chronic obstructive pulmonary disease ([address]     Patient does not have any inhalers at this time, and would like to switch pulmonologist      Chronic pain     Depression     GERD (gastroesophageal reflux disease)     managed with medication     H/O echocardiogram 04/28/2020    EF 60-65%    Hypercholesterolemia     managed with medication     Hypertension     managed with medication     Hypothyroidism     managed with medication     MI (myocardial infarction) ([address] 2008    2 stents placed at that time, daily 81 mg ASA, followed by Dr. Yara Ruano     Nausea & vomiting     JORGE on CPAP     Thyroid disease       Tobacco Use: Medium Risk    Smoking Tobacco Use: Former    Smokeless Tobacco Use: Never     No Known Allergies  Current Outpatient Medications   Medication Instructions    acetaminophen (TYLENOL) 1,000 mg, Oral, EVERY 6 HOURS PRN    albuterol (PROVENTIL) 2.5 mg, Inhalation, EVERY 4 HOURS PRN    albuterol sulfate  (90 Base) MCG/ACT inhaler 2 puffs, Inhalation, EVERY 4 HOURS PRN    ALPRAZolam (XANAX) 0.5-1 mg, Oral, DAILY PRN    aspirin 81 mg, Oral, 2 TIMES DAILY    atorvastatin (LIPITOR) 40 mg, Oral, DAILY    buPROPion (WELLBUTRIN XL) 300 mg, Oral, EVERY MORNING    clotrimazole-betamethasone (LOTRISONE) 1-0.05 % cream Apply topically 2 times daily. famotidine (PEPCID) 40 mg, Oral, 2 TIMES DAILY PRN    furosemide (LASIX) 20 mg, Oral, 2 TIMES DAILY, Take one tablet as needed if your weight jumps up 2lbs or more or if swelling is severe.     hydroCHLOROthiazide (MICROZIDE) 12.5 mg, Oral, EVERY MORNING    levothyroxine (SYNTHROID) 50 mcg, Oral, DAILY    methadone (DOLOPHINE) 5 MG tablet TAKE 1 TABLET BY MOUTH TWICE A DAY MUST LAST 30 DAYS    naproxen sodium (ANAPROX) 220 mg, Oral, 2 TIMES DAILY WITH MEALS    nitroGLYCERIN (NITROSTAT) 0.4 mg, SubLINGual, PRN    ondansetron (ZOFRAN-ODT) 8 mg, Oral, EVERY 8 HOURS PRN    OXYGEN 2 lpm cont    pantoprazole (PROTONIX) 40 mg, Oral, 2 TIMES DAILY BEFORE MEALS    sildenafil (VIAGRA)  mg, Oral, DAILY PRN    tamsulosin (FLOMAX) 0.4 mg, Oral, DAILY    testosterone cypionate (DEPOTESTOTERONE CYPIONATE) 200 mg, IntraMUSCular, EVERY 14 DAYS    zolpidem (AMBIEN) 10 mg, Oral, EVERY EVENING     Past Medical History:   Diagnosis Date    Aneurysm ([address]     Anxiety     Arrhythmia     Arthritis     Back pain     CAD (coronary artery disease)     Followed by [facility], Dr. Yara Ruano, Nitroglycerin PRN-last used 5/2021    Chronic obstructive pulmonary disease [facility])     Patient does not have any inhalers at this time, and would like to switch pulmonologist      Chronic pain     Depression     GERD (gastroesophageal reflux disease)     managed with medication     H/O echocardiogram 04/28/2020    EF 60-65%    Hypercholesterolemia     managed with medication     Hypertension     managed with medication     Hypothyroidism     managed with medication     MI ([facility] infarction) [address] 2008    2 stents placed at that time, daily 81 mg ASA, followed by Dr. Josue Turcios     Nausea & vomiting     JORGE on CPAP     Thyroid disease      Family History   Problem Relation Age of Onset    Heart Disease Father     Prostate Cancer Father     Cancer Mother

## 2022-07-22 ENCOUNTER — TELEMEDICINE (OUTPATIENT)
Dept: FAMILY MEDICINE CLINIC | Facility: CLINIC | Age: 57
End: 2022-07-22
Payer: COMMERCIAL

## 2022-07-22 DIAGNOSIS — Z71.89 COUNSELING AND COORDINATION OF CARE: ICD-10-CM

## 2022-07-22 DIAGNOSIS — E03.9 HYPOTHYROIDISM (ACQUIRED): ICD-10-CM

## 2022-07-22 DIAGNOSIS — I10 HYPERTENSION, UNSPECIFIED TYPE: Primary | ICD-10-CM

## 2022-07-22 DIAGNOSIS — E34.9 HYPOTESTOSTERONEMIA: ICD-10-CM

## 2022-07-22 PROCEDURE — G8427 DOCREV CUR MEDS BY ELIG CLIN: HCPCS | Performed by: FAMILY MEDICINE

## 2022-07-22 PROCEDURE — 3017F COLORECTAL CA SCREEN DOC REV: CPT | Performed by: FAMILY MEDICINE

## 2022-07-22 PROCEDURE — 99214 OFFICE O/P EST MOD 30 MIN: CPT | Performed by: FAMILY MEDICINE

## 2022-07-22 RX ORDER — LEVOTHYROXINE SODIUM 0.07 MG/1
75 TABLET ORAL DAILY
Qty: 90 TABLET | Refills: 1 | Status: SHIPPED | OUTPATIENT
Start: 2022-07-22 | End: 2022-10-24 | Stop reason: SDUPTHER

## 2022-07-22 RX ORDER — CYCLOSPORINE 0.5 MG/ML
EMULSION OPHTHALMIC
COMMUNITY
Start: 2022-07-12

## 2022-07-22 RX ORDER — TESTOSTERONE CYPIONATE 200 MG/ML
200 INJECTION INTRAMUSCULAR WEEKLY
Qty: 5 ML | Refills: 5 | Status: SHIPPED | OUTPATIENT
Start: 2022-07-22 | End: 2022-10-24 | Stop reason: SDUPTHER

## 2022-07-22 NOTE — PROGRESS NOTES
Natty  [address] [place] [address], [address] [place] [address]  Phone: [phone]  Fax: [phone]  [email or web address]      Encounter FigueroaJacquie Morristabitha Orta [address]; Established patient 59 y.o.male; seen 7/22/2022 for: Edema (Pulmonologist put him on lasix so he wonders if he needs to still be on HCTZ) and Discuss Labs      Assessment & Plan    1. Hypertension, unspecified type  Assessment & Plan:  Problem and/or Symptoms are currently not stable and/or well controlled on current treatment plan. Will have patient follow up as directed and make the following changes for further evaluation and/or treatment:     Advised pt that if his BP drops low while taking Lasix short term PRN then he should hold his HCTZ until done with Lasix & then restart  2. Hypotestosteronemia  Assessment & Plan:  Problem and/or Symptoms are currently not stable and/or well controlled on current treatment plan. Will have patient follow up as directed and make the following changes for further evaluation and/or treatment:     Free T still low despite taking supplement every 2 wks; will increase frequency to weekly & recheck next routine labs  Orders:  -     testosterone cypionate (DEPOTESTOTERONE CYPIONATE) 200 MG/ML injection; Inject 1 mL into the muscle once a week., Disp-5 mL, R-5Normal  3. Hypothyroidism (acquired)  Assessment & Plan:  Problem and/or Symptoms are currently not stable and/or well controlled on current treatment plan. Will have patient follow up as directed and make the following changes for further evaluation and/or treatment:     Increasing Synthroid from 50 to 75 mcg dose & f/u next scheduled labs  Orders:  -     levothyroxine (SYNTHROID) 75 MCG tablet; Take 1 tablet by mouth in the morning., Disp-90 tablet, R-1Normal  4.  Counseling and coordination of care  Comments:  Reasurred pt that other lab results are normal and/or not concerning       Check Out Instructions  Return in about 6 months (around 1/10/2023) for Physical, Previsit Labs. Subjective & Objective    HPI Problem and/or Symptoms are currently not stable and/or well controlled on current treatment plan. Will have patient follow up as directed and make the following changes for further evaluation and/or treatment:     Pt with c/o recent lab results & has questions. Also c/o one of his specialists recently starting him on Lasix for leg swelling while also taking HCTZ. Review of Systems     Physical Exam  Patient-Reported Vitals 7/22/2022   Patient-Reported Weight 252   Patient-Reported Height 6.0   Patient-Reported Systolic 292   Patient-Reported Diastolic 67   Patient-Reported Pulse 100   Patient-Reported Temperature 96.7   Patient-Reported Peak Flow No       BP Readings from Last 3 Encounters:   07/21/22 100/60   07/12/22 134/86   07/11/22 130/84     Wt Readings from Last 3 Encounters:   07/21/22 254 lb (115.2 kg)   07/19/22 250 lb 6.4 oz (113.6 kg)   07/11/22 256 lb 1.6 oz (116.2 kg)     There is no height or weight on file to calculate BMI. We discussed the typical prognosis and potential complications of the concern(s), including treatment options. Medication risks, benefits, costs, interactions, and alternatives were discussed as appropriate. I advised him to contact the office if his condition worsens or fails to improve as anticipated. He expressed understanding with the discussion and plan of care. Layton Webster, was evaluated through a synchronous (real-time) audio and/or video encounter. The patient (or guardian if applicable) is aware that this is a billable service, which includes applicable co-pays. This Virtual Visit was conducted with patient's (and/or legal guardian's) consent. The visit was conducted pursuant to the emergency declaration under the [facility], [address] [place] [address] authority and the [employer] and [employer] General Act.   Patient identification was verified, and a caregiver was present when appropriate. The patient was located at Home: [address] [place] [address] [place] [id number]. Provider was located at [place] [address] (Appt Dept): [id number],  [place] [address]. An electronic signature was used to authenticate this note.   -- Sacha Newby MD

## 2022-07-22 NOTE — ASSESSMENT & PLAN NOTE
Problem and/or Symptoms are currently not stable and/or well controlled on current treatment plan.  Will have patient follow up as directed and make the following changes for further evaluation and/or treatment:     Free T still low despite taking supplement every 2 wks; will increase frequency to weekly & recheck next routine labs

## 2022-07-22 NOTE — Clinical Note
This pt has a virtual visit w/me a few weeks ago & does not look like he was called to schedule his f/u visit; please give him a call to schedule his CPX; tks

## 2022-07-23 VITALS — OXYGEN SATURATION: 96 % | WEIGHT: 250.4 LBS | BODY MASS INDEX: 33.04 KG/M2

## 2022-07-23 ASSESSMENT — EXERCISE STRESS TEST: PEAK_BP: 114/76

## 2022-07-23 ASSESSMENT — PULMONARY FUNCTION TESTS
FEV1/FVC: 75
FEV1 (%PREDICTED): 70
FVC (LITERS): 3.83

## 2022-07-23 ASSESSMENT — LIFESTYLE VARIABLES: SMOKELESS_TOBACCO: NO

## 2022-07-25 ENCOUNTER — NURSE ONLY (OUTPATIENT)
Dept: PULMONOLOGY | Age: 57
End: 2022-07-25
Payer: COMMERCIAL

## 2022-07-25 ENCOUNTER — TELEPHONE (OUTPATIENT)
Dept: FAMILY MEDICINE CLINIC | Facility: CLINIC | Age: 57
End: 2022-07-25

## 2022-07-25 DIAGNOSIS — J98.4 RESTRICTIVE LUNG DISEASE: Primary | ICD-10-CM

## 2022-07-25 DIAGNOSIS — J98.4 RESTRICTIVE LUNG DISEASE: ICD-10-CM

## 2022-07-25 DIAGNOSIS — D75.1 POLYCYTHEMIA: ICD-10-CM

## 2022-07-25 LAB
BASOPHILS # BLD: 0 K/UL (ref 0–0.2)
BASOPHILS NFR BLD: 0 % (ref 0–2)
DIFFERENTIAL METHOD BLD: NORMAL
EOSINOPHIL # BLD: 0.1 K/UL (ref 0–0.8)
EOSINOPHIL NFR BLD: 2 % (ref 0.5–7.8)
ERYTHROCYTE [DISTWIDTH] IN BLOOD BY AUTOMATED COUNT: 14 % (ref 11.9–14.6)
FEF25-27, POC: 3.12 L/S
FET, POC: NORMAL
FEV 1 , POC: 2.96 L
FEV1/FVC, POC: NORMAL
FVC, POC: NORMAL
HCT VFR BLD AUTO: 48.5 % (ref 41.1–50.3)
HGB BLD-MCNC: 15.3 G/DL (ref 13.6–17.2)
IMM GRANULOCYTES # BLD AUTO: 0 K/UL (ref 0–0.5)
IMM GRANULOCYTES NFR BLD AUTO: 0 % (ref 0–5)
LUNG AGE, POC: NORMAL
LYMPHOCYTES # BLD: 1.4 K/UL (ref 0.5–4.6)
LYMPHOCYTES NFR BLD: 16 % (ref 13–44)
MCH RBC QN AUTO: 29.1 PG (ref 26.1–32.9)
MCHC RBC AUTO-ENTMCNC: 31.5 G/DL (ref 31.4–35)
MCV RBC AUTO: 92.2 FL (ref 79.6–97.8)
MONOCYTES # BLD: 0.5 K/UL (ref 0.1–1.3)
MONOCYTES NFR BLD: 7 % (ref 4–12)
NEUTS SEG # BLD: 6.2 K/UL (ref 1.7–8.2)
NEUTS SEG NFR BLD: 75 % (ref 43–78)
NRBC # BLD: 0 K/UL (ref 0–0.2)
PEF, POC: 4.83 L/S
PLATELET # BLD AUTO: 231 K/UL (ref 150–450)
PMV BLD AUTO: 12.1 FL (ref 9.4–12.3)
RBC # BLD AUTO: 5.26 M/UL (ref 4.23–5.6)
WBC # BLD AUTO: 8.2 K/UL (ref 4.3–11.1)

## 2022-07-25 PROCEDURE — 94726 PLETHYSMOGRAPHY LUNG VOLUMES: CPT | Performed by: INTERNAL MEDICINE

## 2022-07-25 PROCEDURE — 94010 BREATHING CAPACITY TEST: CPT | Performed by: INTERNAL MEDICINE

## 2022-07-25 PROCEDURE — 94729 DIFFUSING CAPACITY: CPT | Performed by: INTERNAL MEDICINE

## 2022-07-25 NOTE — TELEPHONE ENCOUNTER
Mr. Ramiro Heart left a message requesting his PSA lab be sent to his urologist, fax# [phone]. He states his appt with uro is not until 8/10, but he is having a very hard time urinating and it is painful. He is wondering if there is anything that can be done before he goes to uro. Lab faxed.

## 2022-07-26 ENCOUNTER — HOSPITAL ENCOUNTER (OUTPATIENT)
Dept: CARDIAC REHAB | Age: 57
Setting detail: RECURRING SERIES
Discharge: HOME | End: 2022-07-29
Payer: COMMERCIAL

## 2022-07-26 VITALS — BODY MASS INDEX: 34.39 KG/M2 | WEIGHT: 253.6 LBS

## 2022-07-26 PROCEDURE — G0239 OTH RESP PROC, GROUP: HCPCS

## 2022-07-26 ASSESSMENT — EXERCISE STRESS TEST
PEAK_METS: 2.8
PEAK_BP: 114/70
PEAK_HR: 113

## 2022-07-27 LAB — EPO SERPL-ACNC: 24.5 MIU/ML (ref 2.6–18.5)

## 2022-07-31 LAB
DIRECTOR REVIEW: 489204: NORMAL
JAK2 P.V617F BLD/T QL: NORMAL
LABORATORY COMMENT REPORT: NORMAL

## 2022-08-01 ENCOUNTER — HOSPITAL ENCOUNTER (OUTPATIENT)
Dept: CT IMAGING | Age: 57
Discharge: HOME | End: 2022-08-04
Payer: COMMERCIAL

## 2022-08-01 DIAGNOSIS — R06.09 DYSPNEA ON EXERTION: ICD-10-CM

## 2022-08-01 PROCEDURE — 71250 CT THORAX DX C-: CPT

## 2022-08-02 ENCOUNTER — APPOINTMENT (OUTPATIENT)
Dept: CARDIAC REHAB | Age: 57
End: 2022-08-02
Payer: COMMERCIAL

## 2022-08-04 ENCOUNTER — HOSPITAL ENCOUNTER (OUTPATIENT)
Dept: CARDIAC REHAB | Age: 57
Setting detail: RECURRING SERIES
Discharge: HOME | End: 2022-08-07
Payer: COMMERCIAL

## 2022-08-04 PROCEDURE — G0239 OTH RESP PROC, GROUP: HCPCS

## 2022-08-08 PROBLEM — J44.9 COPD (CHRONIC OBSTRUCTIVE PULMONARY DISEASE) (HCC): Status: ACTIVE | Noted: 2020-07-14

## 2022-08-08 PROBLEM — J98.4 CRLD (CHRONIC RESTRICTIVE LUNG DISEASE): Status: ACTIVE | Noted: 2022-08-08

## 2022-08-11 ENCOUNTER — TELEMEDICINE (OUTPATIENT)
Dept: PULMONOLOGY | Age: 57
End: 2022-08-11
Payer: COMMERCIAL

## 2022-08-11 DIAGNOSIS — Z86.16 HISTORY OF COVID-19: ICD-10-CM

## 2022-08-11 DIAGNOSIS — R29.818 SUSPECTED SLEEP APNEA: ICD-10-CM

## 2022-08-11 DIAGNOSIS — J98.4 RESTRICTIVE LUNG DISEASE: Primary | ICD-10-CM

## 2022-08-11 PROCEDURE — 99442 PR PHYS/QHP TELEPHONE EVALUATION 11-20 MIN: CPT | Performed by: INTERNAL MEDICINE

## 2022-08-11 ASSESSMENT — ENCOUNTER SYMPTOMS
COUGH: 1
WHEEZING: 1
SHORTNESS OF BREATH: 1

## 2022-08-11 NOTE — PROGRESS NOTES
Dr. Amarjit Pearson MD  [address] [place]. [address], [address] [facility]  [phone]    Patient Name:  Sandi Tolentino  YOB: 1965  Virtual Visit: 8/11/2022    Sandi Tolentino, was evaluated through a telephone encounter. The patient (or guardian if applicable) is aware that this is a billable service, which includes applicable co-pays. This Virtual Visit was conducted with patient's (and/or legal guardian's) consent. The visit was conducted pursuant to the emergency declaration under the [facility], [facility] waiver authority and the [employer] and [employer] Act. Patient identification was verified, and a caregiver was present when appropriate. The patient was located at [facility]  Provider was located at Home ([place] [address]): [place].     Telephone visit length- 18 minutes        ASSESSMENT AND PLAN:  (Medical Decision Making)  Sandi Tolentino is a 59 y.o. male with persistent dyspnea with functional class 4 symptoms of dyspnea after COVID-19 in January 2022.      1. Restrictive lung disease  Nocturnal hypoxemia, dyspnea on exertion. His infiltrates have improved dramatically since January. There are some minor groundglass opacities in the bases. I would not recommend bronchoscopy or biopsy for these, however. Continue pulmonary rehab. 2. Suspected sleep apnea  Recommend repeat sleep study with supplemental oxygen. We are coordinating this now. 3. History of COVID-19  Has persistent dyspnea which may represent long-haul COVID-19. Brien Campa MA  Electronically signed    Clinical time for encounter was 55 minutes.   _____________________________________________________________________________________________________________________    Reason for Visit:  Follow up dyspnea    History of Present Illness:     Sandi Tolentino Is a 59 y.o. male with Sandi Tolentino is a 60 y.o.  gentleman with h/o COPD, prior 37+pkyr smoking (quit 2008), AAA, chronic pain, low testosterone, BPH who was last seen at [facility] by Dr. Shakila valerio on February 1, 2022. In January 18, 2022 his CT of the chest revealed bilateral groundglass opacities consistent  with this diagnosis of Covid. He did receive monoclonal antibody therapy. At the time of his appointment in February, he was extremely short of breath and at times his oxygen saturation dropped to 85% (from home on his oximeter.)      He is currently participating in pulmonary rehab, but feels he would benefit from oxygen therapy. He tried to do his sleep study, but unfortunately he desaturated quickly as he relaxed in bed which prompted a panic attack. Nacho mutational testing was negative. Tobacco Use: Medium Risk    Smoking Tobacco Use: Former    Smokeless Tobacco Use: Never         Review of Systems:  Review of Systems   Constitutional:  Positive for fatigue. Respiratory:  Positive for cough, shortness of breath and wheezing. Cardiovascular:  Positive for leg swelling. Neurological:  Positive for dizziness. Physical exam:    There were no vitals filed for this visit. No exam possible      DIAGNOSTIC TESTS:                                                                                                             Spirometry:   Date    12/21/2020 7/25/2022   FVC    3.89 (74%) 2.96 71%   FEV1    3.04 (75%) 3.7 73%   FEV1/FVC    0.78 0.80   FEF 25-75%        Bronchodilator Response        TLC    4.89 (65%) 67%   RV    1.05 (45%) 58%   DLCO    23.4 (61%) 57%     Office Spirometry Results 7/23/2022   FVC (liters) 3.83         TRANSTHORACIC ECHOCARDIOGRAM (TTE) COMPLETE (CONTRAST/BUBBLE/3D PRN) 03/20/2022  7:20 PM (Final)    Narrative  This is a summary report. The complete report is available in the patient's medical record. If you cannot access the medical record, please contact the sending organization for a detailed fax or copy.       Left Ventricle: Left ventricle size is normal. Mildly increased wall thickness. Normal left ventricular systolic function with a visually estimated EF of 55 - 60%. Normal diastolic function. Signed by: Sarahy Ham MD on 3/20/2022  7:20 PM             6MWT   2/4/2022        distance   300 m        Start;end O2 sat   98%; 95%        Max MARICHUY dyspnea   8        COMMENTS       Labs:   Lab Results   Component Value Date/Time     07/12/2022 09:53 AM    K 3.9 07/12/2022 09:53 AM     07/12/2022 09:53 AM    CO2 30 07/12/2022 09:53 AM    BUN 11 07/12/2022 09:53 AM    CREATININE 1.10 07/12/2022 09:53 AM    GLUCOSE 137 07/12/2022 09:53 AM    CALCIUM 9.1 07/12/2022 09:53 AM      Lab Results   Component Value Date    WBC 8.2 07/25/2022    HGB 15.3 07/25/2022    HCT 48.5 07/25/2022    MCV 92.2 07/25/2022     07/25/2022     No results found for: DDIMER  Lab Results   Component Value Date/Time    ALKPHOS 97 07/12/2022 09:53 AM    ALKPHOS 97 01/23/2022 09:22 AM    ALT 28 07/12/2022 09:53 AM    AST 13 07/12/2022 09:53 AM    PROT 7.0 07/12/2022 09:53 AM    BILITOT 0.8 07/12/2022 09:53 AM    BILIDIR 0.1 05/13/2021 05:56 AM    LABALBU 3.5 07/12/2022 09:53 AM     Lab Results   Component Value Date    BNP 54 11/21/2021     No results for input(s): PHAPOC, HAX5YELH, LS0KROT, JRA6UKI, BE in the last 72 hours. Imaging:  CT chest 8/1/22  CHEST:       Mediastinum and visualized thyroid: Normal.       Heart: Coronary       Large Vessels: Mild atherosclerotic calcification. .       Pleura: Normal.       Lungs: Subpleural groundglass opacities persist posteriorly in the bilateral   lower lobes where infiltrates were seen on comparison chest CT. Infiltrates in   the upper lobes have resolved. No honeycombing or traction bronchiectasis.        Airways: Normal.       Lymph nodes: Normal.       Bones/Soft tissues: Normal.       Visualized abdomen: Normal.           Impression   Persistence of subpleural groundglass opacities in the bilateral lower lobes   concerning for chronic sequelae of infection. No clear evidence of UIP. CT PE PROTOCOL:   CT CHEST PULMONARY EMBOLISM W CONTRAST 01/23/2022    Narrative  CT OF THE CHEST WITH INTRAVENOUS CONTRAST, 1/23/2022    Indication: Chest pain and shortness of breath beginning last night. Cough. URI  symptoms for 5 days. Covid +5 days ago. .    Comparison: CT chest with contrast 12/11/2021    Technique:   2.5 mm axial scans from above the aortic arch to the lung bases  following the uneventful administration of 70 mL of Isovue-370. Intravenous  contrast was given to evaluate for pulmonary embolism. All CT scans performed at  this facility use one or all of the following: Automated exposure control,  adjustment of the mA and/or kVp according to patient's size, iterative  reconstruction. Findings: The base of the neck is unremarkable in appearance. No axillary, mediastinal,  or hilar lymphadenopathy is seen. The thoracic aorta is normal in caliber. Opacification of the pulmonary arteries is good. No abnormal filling defects  are seen to suggest pulmonary embolism. Evaluation with lung windows demonstrates mild to moderate multifocal bilateral  lung infiltrates whose appearance is consistent with the history of Covid  pneumonia. No pleural effusion is seen. Lungs are expanded without evidence  for pneumothorax. No acute osseous abnormality is seen. Limited evaluation of the upper abdomen demonstrates no acute abnormality. Impression  1. No evidence for pulmonary embolism. 2.  Mild to moderate multifocal bilateral lung infiltrates whose appearance is  consistent with the history of Covid pneumonia. This report was made using voice transcription.  Despite my best efforts to avoid  any, transcription errors may persist. If there is any question about the  accuracy of the report or need for clarification, then please call [phone], or text me through perfectserv for clarification or correction.            No Known Allergies

## 2022-08-17 VITALS — OXYGEN SATURATION: 95 %

## 2022-08-17 ASSESSMENT — PULMONARY FUNCTION TESTS
FVC (LITERS): 3.83
FEV1/FVC: 75
FEV1 (%PREDICTED): 70

## 2022-08-17 ASSESSMENT — LIFESTYLE VARIABLES: SMOKELESS_TOBACCO: NO

## 2022-08-17 ASSESSMENT — EXERCISE STRESS TEST: PEAK_BP: 122/60

## 2022-08-23 ENCOUNTER — OFFICE VISIT (OUTPATIENT)
Dept: NEUROLOGY | Age: 57
End: 2022-08-23
Payer: COMMERCIAL

## 2022-08-23 VITALS
HEART RATE: 83 BPM | BODY MASS INDEX: 34.13 KG/M2 | DIASTOLIC BLOOD PRESSURE: 74 MMHG | SYSTOLIC BLOOD PRESSURE: 112 MMHG | WEIGHT: 252 LBS | HEIGHT: 72 IN

## 2022-08-23 DIAGNOSIS — U09.9 POST-COVID SYNDROME: Primary | ICD-10-CM

## 2022-08-23 PROCEDURE — 3017F COLORECTAL CA SCREEN DOC REV: CPT | Performed by: PSYCHIATRY & NEUROLOGY

## 2022-08-23 PROCEDURE — 99204 OFFICE O/P NEW MOD 45 MIN: CPT | Performed by: PSYCHIATRY & NEUROLOGY

## 2022-08-23 PROCEDURE — 1036F TOBACCO NON-USER: CPT | Performed by: PSYCHIATRY & NEUROLOGY

## 2022-08-23 PROCEDURE — G8417 CALC BMI ABV UP PARAM F/U: HCPCS | Performed by: PSYCHIATRY & NEUROLOGY

## 2022-08-23 PROCEDURE — G8427 DOCREV CUR MEDS BY ELIG CLIN: HCPCS | Performed by: PSYCHIATRY & NEUROLOGY

## 2022-08-23 RX ORDER — MEMANTINE HYDROCHLORIDE 10 MG/1
TABLET ORAL
Qty: 60 TABLET | Refills: 11 | Status: SHIPPED | OUTPATIENT
Start: 2022-08-23 | End: 2022-10-24 | Stop reason: SDUPTHER

## 2022-08-23 ASSESSMENT — ENCOUNTER SYMPTOMS
SHORTNESS OF BREATH: 1
EYES NEGATIVE: 1
APNEA: 1
GASTROINTESTINAL NEGATIVE: 1
CHEST TIGHTNESS: 1
ALLERGIC/IMMUNOLOGIC NEGATIVE: 1

## 2022-08-23 NOTE — PROGRESS NOTES
[address], [address], [address]  Phone: [phone] Fax [phone]  Dr. Shantelle Hernandez      8/23/2022  Ana Dia     Patient is referred by the following provider for consultation regarding as below:       I reviewed the available records and notes and have examined patient with the following findings:     Chief Complaint:  Chief Complaint   Patient presents with    Establish Care    Neurologic Problem     \"Post COVID Syndrome\"          HPI: This is a right handed 59 y.o.  male who is very pleasant gentleman and he did want his wife to come today but unfortunately she had a virtual medical appointment she had to make. When she he comes next time we will get more specifics but on January 2022 this year the patient came down with COVID and pneumonia. He was treated with monoclonal antibodies had severe respiratory compromise severe anxiety over his lungs. And cognitive slowing which is where we come into effect. 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 His cognition started a little prior to COVID. Where he states is his boss had reminded him several times of doing certain things. But overall he was not in jeopardy of losing his job. It was just some subtleties that he recalls. But post COVID this gentleman has had significant cognitive deficits where he repeats questions and conversations I think pretty quickly, forgets familiar people's names and places. Appointments are absolutely impossible other than with his calendar that he looks at on a daily basis. Spelling simple words is difficult driving from point a to point B he has to cognitively think through it even if it is a familiar place. He constantly confuses which office he sees what doctor at. He is on chronic oxygen now. He has an enormous amounts of anxiety associated with his lungs and lack of breathing at times takes quite a bit to calm down.   He also has a strong family history of his mother and father passing away with advanced dementia. He unfortunately his lungs are now stage IV disease and is on chronic oxygen at at night. He has residual from [employer]. He has a history of tobacco abuse obstructive sleep apnea chronic pain from lumbar and cervical spine he is on methadone for his pain Zofran Wellbutrin Xanax. IMAGING REVIEW:  I REVIEWED PERTINENT  IMAGES AND REPORTS WITH THE PATIENT PERSONALLY, DIRECTLY AND FULLY.      Past Medical History:  Past Medical History:   Diagnosis Date    Aneurysm ([facility]     Anxiety     Arrhythmia     Arthritis     Back pain     CAD (coronary artery disease)     Followed by [facility], Dr. Lizet Santos, Nitroglycerin PRN-last used 5/2021    Chronic obstructive pulmonary disease ([facility]     Patient does not have any inhalers at this time, and would like to switch pulmonologist      Chronic pain     Depression     GERD (gastroesophageal reflux disease)     managed with medication     H/O echocardiogram 04/28/2020    EF 60-65%    Hypercholesterolemia     managed with medication     Hypertension     managed with medication     Hypothyroidism     managed with medication     MI (myocardial infarction) ([facility] [address] [facility] 2008    2 stents placed at that time, daily 81 mg ASA, followed by Dr. Lizet Santos     Nausea & vomiting     JORGE on CPAP     Thyroid disease        Past Surgical History:  Past Surgical History:   Procedure Laterality Date    CARDIAC CATHETERIZATION  05/12/2021    GI  05/2021    EGD     LUMBAR FUSION      ORTHOPEDIC SURGERY  1984    elbow infusion    PTCA  2008    stents x 2    TOTAL HIP ARTHROPLASTY Left 09/03/2021    total       Social History:  Social History     Socioeconomic History    Marital status:      Spouse name: Not on file    Number of children: Not on file    Years of education: Not on file    Highest education level: Not on file   Occupational History    Not on file   Tobacco Use    Smoking status: Former     Packs/day: 1.50     Types: Cigarettes     Quit date: 2008     Years since quittin.6    Smokeless tobacco: Never   Vaping Use    Vaping Use: Never used   Substance and Sexual Activity    Alcohol use: Never    Drug use: Never    Sexual activity: Not on file   Other Topics Concern    Not on file   Social History Narrative     and lives with wife. Has one dog. Has lived in [place] and [place]. Works as a . Social Determinants of Health     Financial Resource Strain: Not on file   Food Insecurity: Not on file   Transportation Needs: Not on file   Physical Activity: Not on file   Stress: Not on file   Social Connections: Not on file   Intimate Partner Violence: Not on file   Housing Stability: Not on file       Family History:   Family History   Problem Relation Age of Onset    Heart Disease Father     Prostate Cancer Father     Cancer Mother        Current Outpatient Medications on File Prior to Visit   Medication Sig Dispense Refill    RESTASIS 0.05 % ophthalmic emulsion INSTILL 1 DROP IN BOTH EYES TWICE DAILY      testosterone cypionate (DEPOTESTOTERONE CYPIONATE) 200 MG/ML injection Inject 1 mL into the muscle once a week. 5 mL 5    levothyroxine (SYNTHROID) 75 MCG tablet Take 1 tablet by mouth in the morning. 90 tablet 1    furosemide (LASIX) 20 MG tablet Take 1 tablet by mouth in the morning and 1 tablet before bedtime. Take one tablet as needed if your weight jumps up 2lbs or more or if swelling is severe. . 30 tablet 11    ALPRAZolam (XANAX) 0.5 MG tablet Take 1-2 tablets by mouth daily as needed for Anxiety.  30 tablet 5    atorvastatin (LIPITOR) 40 MG tablet Take 1 tablet by mouth daily 90 tablet 1    hydroCHLOROthiazide (MICROZIDE) 12.5 MG capsule Take 1 capsule by mouth every morning (Patient taking differently: Take 12.5 mg by mouth every morning 1/2 po daily) 90 capsule 1    buPROPion (WELLBUTRIN XL) 300 MG extended release tablet Take 1 tablet by mouth every morning 90 tablet 1 Negative. No flowsheet data found. No flowsheet data found. Vitals:    08/23/22 0806   BP: 112/74   Site: Left Upper Arm   Position: Sitting   Pulse: 83   Weight: 252 lb (114.3 kg)   Height: 6' (1.829 m)        Physical Exam  Constitutional:       Appearance: Normal appearance. HENT:      Head: Normocephalic and atraumatic. Eyes:      Extraocular Movements: Extraocular movements intact and EOM normal.      Pupils: Pupils are equal, round, and reactive to light. Cardiovascular:      Rate and Rhythm: Normal rate. Pulses: Normal pulses. Pulmonary:      Effort: Pulmonary effort is normal.   Abdominal:      Palpations: Abdomen is soft. Neurological:      Mental Status: He is alert. Deep Tendon Reflexes:      Reflex Scores:       Tricep reflexes are 1+ on the right side and 1+ on the left side. Bicep reflexes are 1+ on the right side and 1+ on the left side. Brachioradialis reflexes are 1+ on the right side and 1+ on the left side. Patellar reflexes are 1+ on the right side and 1+ on the left side. Achilles reflexes are 1+ on the right side and 1+ on the left side. Neurologic Exam     Mental Status   Attention: decreased. Concentration: decreased. Level of consciousness: alert  Knowledge: good and poor. He knows the year the month the president the day of the week he recalls 1 of 3 objects in 5 minutes he can draw a box and clock he recalls 911 and [place]. Cranial Nerves     CN II   Visual fields full to confrontation. CN III, IV, VI   Pupils are equal, round, and reactive to light. Extraocular motions are normal.     CN VII   Facial expression full, symmetric.      Motor Exam   Right arm tone: decreased  Left arm tone: decreased  Right leg tone: decreased  Left leg tone: decreased    Gait, Coordination, and Reflexes     Gait  Gait: wide-based    Tremor   Resting tremor: absent  Intention tremor: absent  Action tremor: absent    Reflexes   Right EMG  [facility]  [address] [place] [address], [address]  Phone:  [phone]  Fax:   [phone]

## 2022-08-30 ENCOUNTER — HOSPITAL ENCOUNTER (OUTPATIENT)
Dept: SLEEP MEDICINE | Age: 57
Discharge: HOME | End: 2022-09-02
Payer: COMMERCIAL

## 2022-08-30 PROCEDURE — 95810 POLYSOM 6/> YRS 4/> PARAM: CPT

## 2022-09-06 ENCOUNTER — TELEPHONE (OUTPATIENT)
Dept: FAMILY MEDICINE CLINIC | Facility: CLINIC | Age: 57
End: 2022-09-06

## 2022-09-06 RX ORDER — TRIAMTERENE AND HYDROCHLOROTHIAZIDE 37.5; 25 MG/1; MG/1
TABLET ORAL
COMMUNITY
Start: 2022-09-02 | End: 2022-10-06

## 2022-09-12 ENCOUNTER — TELEPHONE (OUTPATIENT)
Dept: SLEEP MEDICINE | Age: 57
End: 2022-09-12

## 2022-09-12 NOTE — TELEPHONE ENCOUNTER
Called patient to go over sleep study and let him know cpap titration is recommended due to oxygen being used for the PSG.

## 2022-09-14 ENCOUNTER — HOSPITAL ENCOUNTER (OUTPATIENT)
Dept: MRI IMAGING | Age: 57
Discharge: HOME | End: 2022-09-17
Payer: COMMERCIAL

## 2022-09-14 DIAGNOSIS — U09.9 POST-COVID SYNDROME: ICD-10-CM

## 2022-09-14 PROCEDURE — 2580000003 HC RX 258: Performed by: PSYCHIATRY & NEUROLOGY

## 2022-09-14 PROCEDURE — 70553 MRI BRAIN STEM W/O & W/DYE: CPT

## 2022-09-14 PROCEDURE — 6360000004 HC RX CONTRAST MEDICATION: Performed by: PSYCHIATRY & NEUROLOGY

## 2022-09-14 PROCEDURE — A9579 GAD-BASE MR CONTRAST NOS,1ML: HCPCS | Performed by: PSYCHIATRY & NEUROLOGY

## 2022-09-14 RX ORDER — SODIUM CHLORIDE 0.9 % (FLUSH) 0.9 %
20 SYRINGE (ML) INJECTION AS NEEDED
Status: DISCONTINUED | OUTPATIENT
Start: 2022-09-14 | End: 2022-09-18 | Stop reason: HOSPADM

## 2022-09-14 RX ADMIN — GADOTERIDOL 24 ML: 279.3 INJECTION, SOLUTION INTRAVENOUS at 12:49

## 2022-09-14 RX ADMIN — SODIUM CHLORIDE, PRESERVATIVE FREE 20 ML: 5 INJECTION INTRAVENOUS at 12:49

## 2022-09-15 ENCOUNTER — TELEPHONE (OUTPATIENT)
Dept: NEUROLOGY | Age: 57
End: 2022-09-15

## 2022-09-15 ENCOUNTER — HOSPITAL ENCOUNTER (EMERGENCY)
Age: 57
Discharge: HOME | End: 2022-09-15
Attending: EMERGENCY MEDICINE
Payer: COMMERCIAL

## 2022-09-15 ENCOUNTER — HOSPITAL ENCOUNTER (EMERGENCY)
Dept: GENERAL RADIOLOGY | Age: 57
Discharge: HOME | End: 2022-09-18
Payer: COMMERCIAL

## 2022-09-15 VITALS
HEART RATE: 80 BPM | TEMPERATURE: 98 F | RESPIRATION RATE: 7 BRPM | DIASTOLIC BLOOD PRESSURE: 88 MMHG | OXYGEN SATURATION: 96 % | SYSTOLIC BLOOD PRESSURE: 152 MMHG

## 2022-09-15 DIAGNOSIS — F41.1 ANXIETY STATE: ICD-10-CM

## 2022-09-15 DIAGNOSIS — R07.9 CHEST PAIN, UNSPECIFIED TYPE: Primary | ICD-10-CM

## 2022-09-15 LAB
ALBUMIN SERPL-MCNC: 3.5 G/DL (ref 3.5–5)
ALBUMIN/GLOB SERPL: 0.9 {RATIO} (ref 1.2–3.5)
ALP SERPL-CCNC: 81 U/L (ref 50–136)
ALT SERPL-CCNC: 29 U/L (ref 12–65)
ANION GAP SERPL CALC-SCNC: 5 MMOL/L (ref 4–13)
AST SERPL-CCNC: 14 U/L (ref 15–37)
BILIRUB SERPL-MCNC: 0.6 MG/DL (ref 0.2–1.1)
BUN SERPL-MCNC: 12 MG/DL (ref 6–23)
CALCIUM SERPL-MCNC: 9 MG/DL (ref 8.3–10.4)
CHLORIDE SERPL-SCNC: 100 MMOL/L (ref 101–110)
CO2 SERPL-SCNC: 31 MMOL/L (ref 21–32)
CREAT SERPL-MCNC: 1.16 MG/DL (ref 0.8–1.5)
ERYTHROCYTE [DISTWIDTH] IN BLOOD BY AUTOMATED COUNT: 14.1 % (ref 11.9–14.6)
GLOBULIN SER CALC-MCNC: 3.8 G/DL (ref 2.3–3.5)
GLUCOSE SERPL-MCNC: 138 MG/DL (ref 65–100)
HCT VFR BLD AUTO: 51 % (ref 41.1–50.3)
HGB BLD-MCNC: 16.3 G/DL (ref 13.6–17.2)
MCH RBC QN AUTO: 27.7 PG (ref 26.1–32.9)
MCHC RBC AUTO-ENTMCNC: 32 G/DL (ref 31.4–35)
MCV RBC AUTO: 86.6 FL (ref 79.6–97.8)
NRBC # BLD: 0 K/UL (ref 0–0.2)
PLATELET # BLD AUTO: 221 K/UL (ref 150–450)
PMV BLD AUTO: 10.7 FL (ref 9.4–12.3)
POTASSIUM SERPL-SCNC: 4.1 MMOL/L (ref 3.5–5.1)
PROT SERPL-MCNC: 7.3 G/DL (ref 6.3–8.2)
RBC # BLD AUTO: 5.89 M/UL (ref 4.23–5.6)
SODIUM SERPL-SCNC: 136 MMOL/L (ref 136–145)
TROPONIN I SERPL HS-MCNC: 14.6 PG/ML (ref 0–14)
TROPONIN I SERPL HS-MCNC: 15.1 PG/ML (ref 0–14)
WBC # BLD AUTO: 11 K/UL (ref 4.3–11.1)

## 2022-09-15 PROCEDURE — 6360000002 HC RX W HCPCS: Performed by: NURSE PRACTITIONER

## 2022-09-15 PROCEDURE — 80053 COMPREHEN METABOLIC PANEL: CPT

## 2022-09-15 PROCEDURE — 85027 COMPLETE CBC AUTOMATED: CPT

## 2022-09-15 PROCEDURE — 99285 EMERGENCY DEPT VISIT HI MDM: CPT

## 2022-09-15 PROCEDURE — 6370000000 HC RX 637 (ALT 250 FOR IP): Performed by: NURSE PRACTITIONER

## 2022-09-15 PROCEDURE — 96375 TX/PRO/DX INJ NEW DRUG ADDON: CPT

## 2022-09-15 PROCEDURE — 96374 THER/PROPH/DIAG INJ IV PUSH: CPT

## 2022-09-15 PROCEDURE — 93005 ELECTROCARDIOGRAM TRACING: CPT | Performed by: EMERGENCY MEDICINE

## 2022-09-15 PROCEDURE — 71045 X-RAY EXAM CHEST 1 VIEW: CPT

## 2022-09-15 PROCEDURE — 84484 ASSAY OF TROPONIN QUANT: CPT

## 2022-09-15 RX ORDER — MORPHINE SULFATE 4 MG/ML
4 INJECTION INTRAVENOUS ONCE
Status: COMPLETED | OUTPATIENT
Start: 2022-09-15 | End: 2022-09-15

## 2022-09-15 RX ORDER — ONDANSETRON 8 MG/1
8 TABLET, ORALLY DISINTEGRATING ORAL EVERY 8 HOURS PRN
Qty: 90 TABLET | Refills: 2 | Status: SHIPPED | OUTPATIENT
Start: 2022-09-15 | End: 2022-10-24 | Stop reason: SDUPTHER

## 2022-09-15 RX ORDER — DIAZEPAM 5 MG/ML
2.5 INJECTION, SOLUTION INTRAMUSCULAR; INTRAVENOUS EVERY 4 HOURS PRN
Status: DISCONTINUED | OUTPATIENT
Start: 2022-09-15 | End: 2022-09-15 | Stop reason: HOSPADM

## 2022-09-15 RX ORDER — ASPIRIN 81 MG/1
324 TABLET, CHEWABLE ORAL
Status: COMPLETED | OUTPATIENT
Start: 2022-09-15 | End: 2022-09-15

## 2022-09-15 RX ORDER — LORAZEPAM 1 MG/1
1 TABLET ORAL ONCE
Status: COMPLETED | OUTPATIENT
Start: 2022-09-15 | End: 2022-09-15

## 2022-09-15 RX ADMIN — DIAZEPAM 2.5 MG: 5 INJECTION INTRAMUSCULAR; INTRAVENOUS at 19:10

## 2022-09-15 RX ADMIN — MORPHINE SULFATE 4 MG: 4 INJECTION INTRAVENOUS at 18:14

## 2022-09-15 RX ADMIN — ASPIRIN 324 MG: 81 TABLET, CHEWABLE ORAL at 17:40

## 2022-09-15 RX ADMIN — LORAZEPAM 1 MG: 1 TABLET ORAL at 17:40

## 2022-09-15 NOTE — TELEPHONE ENCOUNTER
Patient left message requesting Mri results. Patient can be reached at [phone]    Results available under imaging. Study was done yesterday.     1st visit 8/23/22  F/U: 1/18/2023

## 2022-09-15 NOTE — ED TRIAGE NOTES
Pt states that woke up this morning having a panic attack. Pt states this started causing chest pain and shortness of breath. Pt states that this is normal for his panic attacks.

## 2022-09-16 LAB
EKG ATRIAL RATE: 109 BPM
EKG DIAGNOSIS: NORMAL
EKG P AXIS: 44 DEGREES
EKG P-R INTERVAL: 144 MS
EKG Q-T INTERVAL: 334 MS
EKG QRS DURATION: 88 MS
EKG QTC CALCULATION (BAZETT): 449 MS
EKG R AXIS: 71 DEGREES
EKG T AXIS: 66 DEGREES
EKG VENTRICULAR RATE: 109 BPM

## 2022-09-16 NOTE — ED PROVIDER NOTES
[facility] Emergency Department Provider Note                   PCP:                Eugenio Chacon MD               Age: 59 y.o. Sex: male       ICD-10-CM    1. Chest pain, unspecified type  R07.9       2. Anxiety state  F41.1           DISPOSITION Decision To Discharge 09/15/2022 07:23:50 PM        MDM  68-year-old male in the ED reporting \"panic attack,\" since waking this morning. States that his anxiety and panic attack causes his chest pain, denies to me shortness of breath despite what is in triage note. States this is the typically gets constellation of symptoms when he is having a panic attack. Is requesting dose of IV Ativan. Denies drug use, alcohol use, exertional complaint, diaphoresis, syncope or syncope, EMERSON, recent illness. No shortness of breath, no lower extremity swelling. Lungs are clear to auscultation but diminished adventitious sounds, no reproducible pain. Ambulatory and conversant length with no increased effort or worsening symptoms. Have been constant for greater than 8 hours. Chest x-ray with no acute process. Troponins are improving in the ED, consistent with his baseline. Labs reassuring. Low clinical suspicion of ACS, PE or DVT he is low risk per Wells criteria, pneumothorax, pericarditis/endocarditis, dissection, pneumonia, CHF exacerbation or other acute emergent process. Discussed with ED attending. Ordered the patient IV Ativan, fortunately this replaced by pharmacy with oral Ativan which patient states has not improved his symptoms. He was given a single dose of morphine which have resolved his chest pain. Patient requesting discharge home. Given IV Valium and observed, no new or worsening symptoms, no return of symptoms. Discussed with ED attending who collaborated care planning, disposition of the patient. HPI as charted. Pt neck is supple, no meningeal signs. Lungs are clear to auscultation, no diminished sounds. Abdomen is soft and not tender. endorses nervousness. Denies shortness of breath, EMERSON, cough or hemoptysis, diaphoresis, lightheadedness, back pain, abdominal pain, nausea vomiting and all other complaint. Lengthy medical history. States he took nitroglycerin earlier today, not sure if it improved. No other therapeutic measures. Nothing noted make worse or better. Denies history of PE or DVT. Denies fever/chills, change in appetite, weight loss, decreased oral intake, blurred vision, headaches, neck pain/stiffness. He is conversant at length with no increased respiratory effort, initially tachycardic, this improved once in exam room. Ambulatory throughout the ED of own volition on multiple occasions without increased respiratory effort or decrease in oxygen saturation. States that this is a regularly occurring presentation of symptoms and states that if he could get a dose of Ativan, symptoms will likely resolve. Alert & oriented x 3, afebrile, hemodynamically stable, non-toxic appearing, appears in no distress. Medical/surgical/social history reviewed with the patient. Review of Systems  Constitutional: Negative for fever. Negative for appetite change, chills, diaphoresis and unexpected weight change. HENT: As in HPI     Eyes: Negative. Respiratory: As in HPI   Cardiovascular: As in HPI GI/: As in HPI      Musculoskeletal: As in HPI   Skin: Negative. Allergic/Immunologic: Negative. Neurological: Negative.   Psych: As in HPI      Past Medical History:   Diagnosis Date    Aneurysm ([facility] [address]     Anxiety     Arrhythmia     Arthritis     Back pain     CAD (coronary artery disease)     Followed by [facility], Dr. Dina Barry, Nitroglycerin PRN-last used 5/2021    Chronic obstructive pulmonary disease ([address]     Patient does not have any inhalers at this time, and would like to switch pulmonologist      Chronic pain     Depression     GERD (gastroesophageal reflux disease)     managed with medication     H/O echocardiogram 2020    EF 60-65%    Hypercholesterolemia     managed with medication     Hypertension     managed with medication     Hypothyroidism     managed with medication     MI (myocardial infarction) ([facility] [address]     2 stents placed at that time, daily 81 mg ASA, followed by Dr. Yara Ruano     Nausea & vomiting     JORGE on CPAP     Thyroid disease         Past Surgical History:   Procedure Laterality Date    CARDIAC CATHETERIZATION  2021    GI  2021    EGD     LUMBAR FUSION      ORTHOPEDIC SURGERY  1984    elbow infusion    PTCA  2008    stents x 2    TOTAL HIP ARTHROPLASTY Left 2021    total        Family History   Problem Relation Age of Onset    Heart Disease Father     Prostate Cancer Father     Cancer Mother         Social History     Socioeconomic History    Marital status:      Spouse name: None    Number of children: None    Years of education: None    Highest education level: None   Tobacco Use    Smoking status: Former     Packs/day: 1.50     Types: Cigarettes     Quit date: 2008     Years since quittin.7    Smokeless tobacco: Never   Vaping Use    Vaping Use: Never used   Substance and Sexual Activity    Alcohol use: Never    Drug use: Never   Social History Narrative     and lives with wife. Has one dog. Has lived in [place] and [place]. Works as a . Patient has no known allergies.      Discharge Medication List as of 9/15/2022  7:12 PM        CONTINUE these medications which have NOT CHANGED    Details   triamterene-hydroCHLOROthiazide (MAXZIDE-25) 37.5-25 MG per tablet TAKE 1 TABLET BY MOUTH DAILYHistorical Med      memantine (NAMENDA) 10 MG tablet Take half bid for one month than one bid, Disp-60 tablet, R-11Normal      RESTASIS 0.05 % ophthalmic emulsion INSTILL 1 DROP IN BOTH EYES TWICE DAILY, DAWHistorical Med      testosterone cypionate (DEPOTESTOTERONE CYPIONATE) 200 MG/ML injection Inject 1 mL into the muscle once a week., Disp-5 mL, R-5Normal      levothyroxine (SYNTHROID) 75 MCG tablet Take 1 tablet by mouth in the morning., Disp-90 tablet, R-1Normal      furosemide (LASIX) 20 MG tablet Take 1 tablet by mouth in the morning and 1 tablet before bedtime. Take one tablet as needed if your weight jumps up 2lbs or more or if swelling is severe. ., Disp-30 tablet, R-11Normal      ALPRAZolam (XANAX) 0.5 MG tablet Take 1-2 tablets by mouth daily as needed for Anxiety. , Disp-30 tablet, R-5Normal      atorvastatin (LIPITOR) 40 MG tablet Take 1 tablet by mouth daily, Disp-90 tablet, R-1Normal      hydroCHLOROthiazide (MICROZIDE) 12.5 MG capsule Take 1 capsule by mouth every morning, Disp-90 capsule, R-1Normal      buPROPion (WELLBUTRIN XL) 300 MG extended release tablet Take 1 tablet by mouth every morning, Disp-90 tablet, R-1Normal      pantoprazole (PROTONIX) 40 MG tablet Take 1 tablet by mouth 2 times daily (before meals), Disp-180 tablet, R-1Normal      famotidine (PEPCID) 40 MG tablet Take 1 tablet by mouth 2 times daily as needed (heartburn), Disp-180 tablet, R-1Normal      tamsulosin (FLOMAX) 0.4 MG capsule Take 1 capsule by mouth daily, Disp-90 capsule, R-1Normal      zolpidem (AMBIEN) 10 MG tablet Take 1 tablet by mouth every evening., Disp-30 tablet, R-5Normal      clotrimazole-betamethasone (LOTRISONE) 1-0.05 % cream Apply topically 2 times daily. , Disp-1 each, R-2, Normal      sildenafil (VIAGRA) 50 MG tablet Take 1-2 tablets by mouth daily as needed for Erectile Dysfunction, Disp-30 tablet, R-2Normal      OXYGEN 2 lpm contHistorical Med      acetaminophen (TYLENOL) 500 MG tablet Take 1,000 mg by mouth every 6 hours as neededHistorical Med      albuterol sulfate  (90 Base) MCG/ACT inhaler Inhale 2 puffs into the lungs every 4 hours as neededHistorical Med      albuterol (PROVENTIL) (2.5 MG/3ML) 0.083% nebulizer solution Inhale 2.5 mg into the lungs every 4 hours as neededHistorical Med      aspirin 81 MG EC tablet Take 81 mg by mouth 2 times dailyHistorical Med      methadone (DOLOPHINE) 5 MG tablet TAKE 1 TABLET BY MOUTH TWICE A DAY MUST LAST 30 DAYSHistorical Med      naproxen sodium (ANAPROX) 220 MG tablet Take 220 mg by mouth 2 times daily (with meals)Historical Med      nitroGLYCERIN (NITROSTAT) 0.4 MG SL tablet Place 0.4 mg under the tongue as neededHistorical Med              Vitals signs and nursing note reviewed. Patient Vitals for the past 4 hrs:   Temp Pulse Resp BP SpO2   09/15/22 1900 -- 80 (!) 7 (!) 152/88 96 %   09/15/22 1845 98 °F (36.7 °C) 77 22 137/74 97 %   09/15/22 1815 -- 88 15 135/83 95 %   09/15/22 1800 -- 82 20 (!) 145/81 --   09/15/22 1730 -- 87 -- -- 97 %   09/15/22 1715 -- 85 -- -- --          Physical Exam   Constitutional: Oriented to person, place, and time. Appears well-developed and well-nourished. No distress. HENT:    Head: Normocephalic and atraumatic. Right Ear: External ear normal.    Left Ear: External ear normal.    Nose: Nose normal.    Mouth/Throat: Mouth normal. Uvula midline, no erythema/exudates/edema. No drooling, no voice change. No pain with ROM of neck. Eyes: Conjunctivae are normal.   Neck: Supple. No tracheal deviation. Not tender, not rigid, no menningeal signs. Cardiovascular: Normal rate, intact distal pulses. No pitting edema, no unilateral swelling, no murmur heard. Pulmonary/Chest: No respiratory distress. Lungs are clear to auscultation without diminished or adventitious sounds, no crepitus, no tenderness. Abdominal: Soft. No tenderness, distention, guarding, rebound or rigidity. Normoactive bowel sounds. Musculoskeletal: No obvious deformity, erythema, edema. Neurological: Alert and oriented to person, place, and time. Skin:  No abrasion, no lesion, no petechiae and no rash noted. Not diaphoretic. No cyanosis, erythema, or pallor. Psychiatric: Initially, quite anxious, this resolved while in the ED. No SI, no HI, no hallucinations. Normal mood and affect.  Behavior is normal.    Nursing note and vitals reviewed. Procedures  EKG: Interpreted by ED attending in absence of cardiologist.  Sinus rhythm, tachycardic rate, occasional PVC, no STEMI. Labs Reviewed   CBC - Abnormal; Notable for the following components:       Result Value    RBC 5.89 (*)     Hematocrit 51.0 (*)     All other components within normal limits   COMPREHENSIVE METABOLIC PANEL - Abnormal; Notable for the following components:    Chloride 100 (*)     Glucose 138 (*)     AST 14 (*)     Globulin 3.8 (*)     Albumin/Globulin Ratio 0.9 (*)     All other components within normal limits   TROPONIN - Abnormal; Notable for the following components:    Troponin, High Sensitivity 15.1 (*)     All other components within normal limits   TROPONIN - Abnormal; Notable for the following components:    Troponin, High Sensitivity 14.6 (*)     All other components within normal limits        XR CHEST PORTABLE   Final Result   No acute airspace disease. Voice dictation software was used during the making of this note. This software is not perfect and grammatical and other typographical errors may be present. This note has not been completely proofread for errors.          Hector Forbes, FLO - CNP  09/15/22 3130

## 2022-09-18 ENCOUNTER — HOSPITAL ENCOUNTER (EMERGENCY)
Age: 57
Discharge: HOME | End: 2022-09-19
Attending: EMERGENCY MEDICINE
Payer: COMMERCIAL

## 2022-09-18 ENCOUNTER — HOSPITAL ENCOUNTER (EMERGENCY)
Dept: GENERAL RADIOLOGY | Age: 57
Discharge: HOME | End: 2022-09-21
Payer: COMMERCIAL

## 2022-09-18 ENCOUNTER — HOSPITAL ENCOUNTER (EMERGENCY)
Age: 57
Discharge: HOME | End: 2022-09-18
Attending: GENERAL PRACTICE
Payer: COMMERCIAL

## 2022-09-18 VITALS
HEART RATE: 87 BPM | HEIGHT: 72 IN | SYSTOLIC BLOOD PRESSURE: 134 MMHG | RESPIRATION RATE: 16 BRPM | WEIGHT: 255 LBS | OXYGEN SATURATION: 95 % | BODY MASS INDEX: 34.54 KG/M2 | TEMPERATURE: 98 F | DIASTOLIC BLOOD PRESSURE: 76 MMHG

## 2022-09-18 DIAGNOSIS — R07.9 CHEST PAIN, UNSPECIFIED TYPE: Primary | ICD-10-CM

## 2022-09-18 DIAGNOSIS — F41.1 ANXIETY STATE: Primary | ICD-10-CM

## 2022-09-18 DIAGNOSIS — F41.1 ANXIETY STATE: ICD-10-CM

## 2022-09-18 LAB
ALBUMIN SERPL-MCNC: 3.2 G/DL (ref 3.5–5)
ALBUMIN/GLOB SERPL: 1 {RATIO} (ref 1.2–3.5)
ALP SERPL-CCNC: 74 U/L (ref 50–136)
ALT SERPL-CCNC: 23 U/L (ref 12–65)
ANION GAP SERPL CALC-SCNC: 4 MMOL/L (ref 4–13)
AST SERPL-CCNC: 16 U/L (ref 15–37)
BILIRUB SERPL-MCNC: 0.6 MG/DL (ref 0.2–1.1)
BUN SERPL-MCNC: 13 MG/DL (ref 6–23)
CALCIUM SERPL-MCNC: 8.2 MG/DL (ref 8.3–10.4)
CHLORIDE SERPL-SCNC: 103 MMOL/L (ref 101–110)
CO2 SERPL-SCNC: 32 MMOL/L (ref 21–32)
CREAT SERPL-MCNC: 1.14 MG/DL (ref 0.8–1.5)
ERYTHROCYTE [DISTWIDTH] IN BLOOD BY AUTOMATED COUNT: 14.2 % (ref 11.9–14.6)
GLOBULIN SER CALC-MCNC: 3.3 G/DL (ref 2.3–3.5)
GLUCOSE SERPL-MCNC: 146 MG/DL (ref 65–100)
HCT VFR BLD AUTO: 48.4 % (ref 41.1–50.3)
HGB BLD-MCNC: 15.1 G/DL (ref 13.6–17.2)
MCH RBC QN AUTO: 27.6 PG (ref 26.1–32.9)
MCHC RBC AUTO-ENTMCNC: 31.2 G/DL (ref 31.4–35)
MCV RBC AUTO: 88.5 FL (ref 79.6–97.8)
NRBC # BLD: 0 K/UL (ref 0–0.2)
PLATELET # BLD AUTO: 222 K/UL (ref 150–450)
PMV BLD AUTO: 11.3 FL (ref 9.4–12.3)
POTASSIUM SERPL-SCNC: 3.8 MMOL/L (ref 3.5–5.1)
PROT SERPL-MCNC: 6.5 G/DL (ref 6.3–8.2)
RBC # BLD AUTO: 5.47 M/UL (ref 4.23–5.6)
SODIUM SERPL-SCNC: 139 MMOL/L (ref 136–145)
TROPONIN I SERPL HS-MCNC: 17.9 PG/ML (ref 0–14)
TROPONIN I SERPL HS-MCNC: 23.8 PG/ML (ref 0–14)
WBC # BLD AUTO: 10.8 K/UL (ref 4.3–11.1)

## 2022-09-18 PROCEDURE — 96376 TX/PRO/DX INJ SAME DRUG ADON: CPT

## 2022-09-18 PROCEDURE — 93005 ELECTROCARDIOGRAM TRACING: CPT | Performed by: EMERGENCY MEDICINE

## 2022-09-18 PROCEDURE — 96374 THER/PROPH/DIAG INJ IV PUSH: CPT

## 2022-09-18 PROCEDURE — 71045 X-RAY EXAM CHEST 1 VIEW: CPT

## 2022-09-18 PROCEDURE — 99285 EMERGENCY DEPT VISIT HI MDM: CPT

## 2022-09-18 PROCEDURE — 99284 EMERGENCY DEPT VISIT MOD MDM: CPT

## 2022-09-18 PROCEDURE — 6360000002 HC RX W HCPCS: Performed by: GENERAL PRACTICE

## 2022-09-18 PROCEDURE — 96375 TX/PRO/DX INJ NEW DRUG ADDON: CPT

## 2022-09-18 PROCEDURE — 93005 ELECTROCARDIOGRAM TRACING: CPT | Performed by: GENERAL PRACTICE

## 2022-09-18 PROCEDURE — 80053 COMPREHEN METABOLIC PANEL: CPT

## 2022-09-18 PROCEDURE — 84484 ASSAY OF TROPONIN QUANT: CPT

## 2022-09-18 PROCEDURE — 6370000000 HC RX 637 (ALT 250 FOR IP): Performed by: GENERAL PRACTICE

## 2022-09-18 PROCEDURE — 85027 COMPLETE CBC AUTOMATED: CPT

## 2022-09-18 RX ORDER — LORAZEPAM 2 MG/ML
0.5 INJECTION INTRAMUSCULAR ONCE
Status: COMPLETED | OUTPATIENT
Start: 2022-09-18 | End: 2022-09-18

## 2022-09-18 RX ORDER — MORPHINE SULFATE 4 MG/ML
4 INJECTION INTRAVENOUS
Status: COMPLETED | OUTPATIENT
Start: 2022-09-18 | End: 2022-09-18

## 2022-09-18 RX ORDER — ONDANSETRON 2 MG/ML
4 INJECTION INTRAMUSCULAR; INTRAVENOUS ONCE
Status: COMPLETED | OUTPATIENT
Start: 2022-09-18 | End: 2022-09-18

## 2022-09-18 RX ORDER — HYDROCODONE BITARTRATE AND ACETAMINOPHEN 5; 325 MG/1; MG/1
1 TABLET ORAL
Status: COMPLETED | OUTPATIENT
Start: 2022-09-18 | End: 2022-09-18

## 2022-09-18 RX ADMIN — ONDANSETRON 4 MG: 2 INJECTION INTRAMUSCULAR; INTRAVENOUS at 17:05

## 2022-09-18 RX ADMIN — LORAZEPAM 0.5 MG: 2 INJECTION, SOLUTION INTRAMUSCULAR; INTRAVENOUS at 15:02

## 2022-09-18 RX ADMIN — HYDROCODONE BITARTRATE AND ACETAMINOPHEN 1 TABLET: 5; 325 TABLET ORAL at 18:42

## 2022-09-18 RX ADMIN — MORPHINE SULFATE 4 MG: 4 INJECTION INTRAVENOUS at 17:05

## 2022-09-18 ASSESSMENT — PAIN - FUNCTIONAL ASSESSMENT
PAIN_FUNCTIONAL_ASSESSMENT: 0-10
PAIN_FUNCTIONAL_ASSESSMENT: 0-10

## 2022-09-18 ASSESSMENT — PAIN SCALES - GENERAL
PAINLEVEL_OUTOF10: 4
PAINLEVEL_OUTOF10: 7
PAINLEVEL_OUTOF10: 6

## 2022-09-18 ASSESSMENT — PAIN DESCRIPTION - LOCATION
LOCATION: CHEST
LOCATION: CHEST

## 2022-09-18 ASSESSMENT — PAIN DESCRIPTION - FREQUENCY: FREQUENCY: CONTINUOUS

## 2022-09-18 ASSESSMENT — PAIN DESCRIPTION - PAIN TYPE: TYPE: ACUTE PAIN

## 2022-09-18 ASSESSMENT — PAIN DESCRIPTION - DESCRIPTORS
DESCRIPTORS: ACHING
DESCRIPTORS: SHARP;THROBBING

## 2022-09-18 ASSESSMENT — PAIN DESCRIPTION - ORIENTATION: ORIENTATION: MID

## 2022-09-18 NOTE — ED NOTES
Patient states he wears 3L NC O2 at home. Placed on 3L at patient's request. SpO2 97% on RA.       Bridgette Neal RN  09/18/22 [id number]

## 2022-09-18 NOTE — ED NOTES
I have reviewed discharge instructions with the patient. The patient verbalized understanding. Patient left ED via Discharge Method: ambulatory to Home with (self). Opportunity for questions and clarification provided. Patient given 0 scripts. To continue your aftercare when you leave the hospital, you may receive an automated call from our care team to check in on how you are doing. This is a free service and part of our promise to provide the best care and service to meet your aftercare needs.  If you have questions, or wish to unsubscribe from this service please call [phone]. Thank you for Choosing our [facility] Emergency Department.         Dayna Gonzalez RN  09/18/22 5515

## 2022-09-18 NOTE — ED TRIAGE NOTES
Pt states he was having a panic attack when his chest pain started around 45 minutes ago. Pt states he was sitting when the pain started. Pt also c/o shortness of breath. Pt states he wears 3L O2 at home at all times.

## 2022-09-18 NOTE — ED PROVIDER NOTES
Emergency Department Provider Note                   PCP:                Shon Villareal MD               Age: 59 y.o. Sex: male     No diagnosis found. DISPOSITION          MDM  Number of Diagnoses or Management Options  Anxiety state: new, needed workup  Chest pain, unspecified type: new, needed workup  Diagnosis management comments: Patient presented with chest pain. I have considered ischemic heart disease, pulmonary embolism, TAD, and other cardiopulmonary emergencies. Patient's EKG was unchanged from previous. He did improve with Ativan and morphine. Patient did have mild elevation in his troponin but not consistent with NSTEMI and close to his baseline troponins. Patient however, was a bounce back and so I discussed the case with cardiology on-call, Dr. Steven Bliss. He knew the patient and knew that he had an angiogram 5 months ago that showed clean stents. We reviewed his case from today. He felt comfortable for the patient to be discharged home and to follow-up this week with cardiology. Patient feels much improved on repeat examination and is agreeable to the plan. Return precautions were given.        Amount and/or Complexity of Data Reviewed  Clinical lab tests: ordered and reviewed  Tests in the radiology section of CPT®: ordered and reviewed  Tests in the medicine section of CPT®: ordered and reviewed  Discussion of test results with the performing providers: no  Decide to obtain previous medical records or to obtain history from someone other than the patient: no  Obtain history from someone other than the patient: no  Review and summarize past medical records: yes  Discuss the patient with other providers: no  Independent visualization of images, tracings, or specimens: yes    Risk of Complications, Morbidity, and/or Mortality  Presenting problems: moderate  Diagnostic procedures: moderate  Management options: moderate    Patient Progress  Patient progress: improved Orders Placed This Encounter   Procedures    XR CHEST PORTABLE    CBC    Comprehensive Metabolic Panel    Troponin    Cardiac Monitor    Pulse Oximetry    EKG 12 Lead    Saline lock IV        Medications   LORazepam (ATIVAN) injection 0.5 mg (has no administration in time range)       New Prescriptions    No medications on file        Andrea Cramer is a 59 y.o. male who presents to the Emergency Department with chief complaint of    Chief Complaint   Patient presents with    Chest Pain      Patient presents with chest pain. He describes it as a sharp pain and it started about 45 minutes ago. Its in his left upper chest projecting towards the shoulder. He also feels very anxious and feels this is more like he has panic attacks. He feels palpitations as well. He also has associated sweating which is common with him when he feels anxious. Patient denies any exertional chest pain. Patient states he does have 2 stents and his last stent was 2010. He does see cardiologist at [facility] cardiology. Patient denies any recent illness or any other symptoms. Patient states he was here 3 days ago and he received Ativan. Review of Systems   All other systems reviewed and are negative.     Past Medical History:   Diagnosis Date    Aneurysm ([address]     Anxiety     Arrhythmia     Arthritis     Back pain     CAD (coronary artery disease)     Followed by [facility], Dr. Porfirio Zaragoza, Nitroglycerin PRN-last used 5/2021    Chronic obstructive pulmonary disease ([address]     Patient does not have any inhalers at this time, and would like to switch pulmonologist      Chronic pain     Depression     GERD (gastroesophageal reflux disease)     managed with medication     H/O echocardiogram 04/28/2020    EF 60-65%    Hypercholesterolemia     managed with medication     Hypertension     managed with medication     Hypothyroidism     managed with medication     MI (myocardial infarction) ([address] 2008    2 stents placed at that time, daily 81 mg ASA, followed by Dr. Steven Bliss     Nausea & vomiting     JORGE on CPAP     Thyroid disease         Past Surgical History:   Procedure Laterality Date    CARDIAC CATHETERIZATION  2021    GI  2021    EGD     LUMBAR FUSION      ORTHOPEDIC SURGERY  1984    elbow infusion    PTCA  2008    stents x 2    TOTAL HIP ARTHROPLASTY Left 2021    total        Family History   Problem Relation Age of Onset    Heart Disease Father     Prostate Cancer Father     Cancer Mother         Social History     Socioeconomic History    Marital status:    Tobacco Use    Smoking status: Former     Packs/day: 1.50     Types: Cigarettes     Quit date: 2008     Years since quittin.7    Smokeless tobacco: Never   Vaping Use    Vaping Use: Never used   Substance and Sexual Activity    Alcohol use: Never    Drug use: Never   Social History Narrative     and lives with wife. Has one dog. Has lived in [place] and [place]. Works as a . Patient has no known allergies. Previous Medications    ACETAMINOPHEN (TYLENOL) 500 MG TABLET    Take 1,000 mg by mouth every 6 hours as needed    ALBUTEROL (PROVENTIL) (2.5 MG/3ML) 0.083% NEBULIZER SOLUTION    Inhale 2.5 mg into the lungs every 4 hours as needed    ALBUTEROL SULFATE  (90 BASE) MCG/ACT INHALER    Inhale 2 puffs into the lungs every 4 hours as needed    ALPRAZOLAM (XANAX) 0.5 MG TABLET    Take 1-2 tablets by mouth daily as needed for Anxiety. ASPIRIN 81 MG EC TABLET    Take 81 mg by mouth 2 times daily    ATORVASTATIN (LIPITOR) 40 MG TABLET    Take 1 tablet by mouth daily    BUPROPION (WELLBUTRIN XL) 300 MG EXTENDED RELEASE TABLET    Take 1 tablet by mouth every morning    CLOTRIMAZOLE-BETAMETHASONE (LOTRISONE) 1-0.05 % CREAM    Apply topically 2 times daily.     FAMOTIDINE (PEPCID) 40 MG TABLET    Take 1 tablet by mouth 2 times daily as needed (heartburn)    FUROSEMIDE (LASIX) 20 MG TABLET    Take 1 tablet by mouth in the morning and 1 tablet before bedtime. Take one tablet as needed if your weight jumps up 2lbs or more or if swelling is severe. [employer] HYDROCHLOROTHIAZIDE (MICROZIDE) 12.5 MG CAPSULE    Take 1 capsule by mouth every morning    LEVOTHYROXINE (SYNTHROID) 75 MCG TABLET    Take 1 tablet by mouth in the morning. MEMANTINE (NAMENDA) 10 MG TABLET    Take half bid for one month than one bid    METHADONE (DOLOPHINE) 5 MG TABLET    TAKE 1 TABLET BY MOUTH TWICE A DAY MUST LAST 30 DAYS    NAPROXEN SODIUM (ANAPROX) 220 MG TABLET    Take 220 mg by mouth 2 times daily (with meals)    NITROGLYCERIN (NITROSTAT) 0.4 MG SL TABLET    Place 0.4 mg under the tongue as needed    ONDANSETRON (ZOFRAN-ODT) 8 MG TBDP DISINTEGRATING TABLET    Take 1 tablet by mouth every 8 hours as needed for Nausea    OXYGEN    2 lpm cont    PANTOPRAZOLE (PROTONIX) 40 MG TABLET    Take 1 tablet by mouth 2 times daily (before meals)    RESTASIS 0.05 % OPHTHALMIC EMULSION    INSTILL 1 DROP IN BOTH EYES TWICE DAILY    SILDENAFIL (VIAGRA) 50 MG TABLET    Take 1-2 tablets by mouth daily as needed for Erectile Dysfunction    TAMSULOSIN (FLOMAX) 0.4 MG CAPSULE    Take 1 capsule by mouth daily    TESTOSTERONE CYPIONATE (DEPOTESTOTERONE CYPIONATE) 200 MG/ML INJECTION    Inject 1 mL into the muscle once a week. TRIAMTERENE-HYDROCHLOROTHIAZIDE (MAXZIDE-25) 37.5-25 MG PER TABLET    TAKE 1 TABLET BY MOUTH DAILY    ZOLPIDEM (AMBIEN) 10 MG TABLET    Take 1 tablet by mouth every evening. Vitals signs and nursing note reviewed. Patient Vitals for the past 4 hrs:   Temp Pulse Resp BP SpO2   09/18/22 1432 98 °F (36.7 °C) (!) 106 17 121/60 97 %          Physical Exam  Constitutional:       General: He is not in acute distress. Comments: Anxious appearing   HENT:      Head: Normocephalic and atraumatic. Right Ear: External ear normal.      Left Ear: External ear normal.      Nose: No congestion or rhinorrhea.       Mouth/Throat:      Mouth: Mucous membranes are moist.

## 2022-09-19 VITALS
DIASTOLIC BLOOD PRESSURE: 80 MMHG | BODY MASS INDEX: 34.54 KG/M2 | OXYGEN SATURATION: 96 % | WEIGHT: 255 LBS | TEMPERATURE: 98.1 F | RESPIRATION RATE: 16 BRPM | SYSTOLIC BLOOD PRESSURE: 121 MMHG | HEIGHT: 72 IN | HEART RATE: 82 BPM

## 2022-09-19 LAB
ALBUMIN SERPL-MCNC: 3.2 G/DL (ref 3.5–5)
ALBUMIN/GLOB SERPL: 0.9 {RATIO} (ref 1.2–3.5)
ALP SERPL-CCNC: 73 U/L (ref 50–136)
ALT SERPL-CCNC: 23 U/L (ref 12–65)
ANION GAP SERPL CALC-SCNC: 6 MMOL/L (ref 4–13)
AST SERPL-CCNC: 14 U/L (ref 15–37)
BILIRUB SERPL-MCNC: 0.5 MG/DL (ref 0.2–1.1)
BUN SERPL-MCNC: 12 MG/DL (ref 6–23)
CALCIUM SERPL-MCNC: 8.1 MG/DL (ref 8.3–10.4)
CHLORIDE SERPL-SCNC: 103 MMOL/L (ref 101–110)
CO2 SERPL-SCNC: 29 MMOL/L (ref 21–32)
CREAT SERPL-MCNC: 1.08 MG/DL (ref 0.8–1.5)
EKG ATRIAL RATE: 81 BPM
EKG DIAGNOSIS: NORMAL
EKG P AXIS: 58 DEGREES
EKG P-R INTERVAL: 150 MS
EKG Q-T INTERVAL: 352 MS
EKG QRS DURATION: 90 MS
EKG QTC CALCULATION (BAZETT): 408 MS
EKG R AXIS: 58 DEGREES
EKG T AXIS: 48 DEGREES
EKG VENTRICULAR RATE: 81 BPM
ERYTHROCYTE [DISTWIDTH] IN BLOOD BY AUTOMATED COUNT: 14.2 % (ref 11.9–14.6)
GLOBULIN SER CALC-MCNC: 3.4 G/DL (ref 2.3–3.5)
GLUCOSE SERPL-MCNC: 131 MG/DL (ref 65–100)
HCT VFR BLD AUTO: 47.3 % (ref 41.1–50.3)
HGB BLD-MCNC: 15 G/DL (ref 13.6–17.2)
MCH RBC QN AUTO: 27.8 PG (ref 26.1–32.9)
MCHC RBC AUTO-ENTMCNC: 31.7 G/DL (ref 31.4–35)
MCV RBC AUTO: 87.6 FL (ref 79.6–97.8)
NRBC # BLD: 0 K/UL (ref 0–0.2)
PLATELET # BLD AUTO: 202 K/UL (ref 150–450)
PMV BLD AUTO: 10.9 FL (ref 9.4–12.3)
POTASSIUM SERPL-SCNC: 3.9 MMOL/L (ref 3.5–5.1)
PROT SERPL-MCNC: 6.6 G/DL (ref 6.3–8.2)
RBC # BLD AUTO: 5.4 M/UL (ref 4.23–5.6)
SODIUM SERPL-SCNC: 138 MMOL/L (ref 136–145)
TROPONIN I SERPL HS-MCNC: 14.7 PG/ML (ref 0–14)
WBC # BLD AUTO: 11.5 K/UL (ref 4.3–11.1)

## 2022-09-19 PROCEDURE — 6360000002 HC RX W HCPCS: Performed by: EMERGENCY MEDICINE

## 2022-09-19 PROCEDURE — 85027 COMPLETE CBC AUTOMATED: CPT

## 2022-09-19 PROCEDURE — 96376 TX/PRO/DX INJ SAME DRUG ADON: CPT

## 2022-09-19 PROCEDURE — 96374 THER/PROPH/DIAG INJ IV PUSH: CPT

## 2022-09-19 PROCEDURE — 84484 ASSAY OF TROPONIN QUANT: CPT

## 2022-09-19 PROCEDURE — 80053 COMPREHEN METABOLIC PANEL: CPT

## 2022-09-19 RX ORDER — LORAZEPAM 2 MG/ML
1 INJECTION INTRAMUSCULAR ONCE
Status: COMPLETED | OUTPATIENT
Start: 2022-09-19 | End: 2022-09-19

## 2022-09-19 RX ADMIN — LORAZEPAM 1 MG: 2 INJECTION, SOLUTION INTRAMUSCULAR; INTRAVENOUS at 02:14

## 2022-09-19 RX ADMIN — LORAZEPAM 1 MG: 2 INJECTION, SOLUTION INTRAMUSCULAR; INTRAVENOUS at 00:39

## 2022-09-19 NOTE — ED TRIAGE NOTES
Pt c/o panic attack that began approx 45 min before arrival to the ED. States that he is also having chest pain that he was seen and treated for earlier today,.

## 2022-09-19 NOTE — ED PROVIDER NOTES
Emergency Department Provider Note                   PCP:                Arnita Skiff, MD               Age: 59 y.o. Sex: male       ICD-10-CM    1. Anxiety state  F41.1           DISPOSITION Decision To Discharge 09/19/2022 02:17:26 AM       MDM  Number of Diagnoses or Management Options  Anxiety state  Diagnosis management comments: Patient is feeling better on reevaluation still has some anxiety but not near the level he did on initial evaluation. On further conversation with the patient he does admit to taking himself off Wellbutrin 1 week ago about right before the symptoms started. he took himself off of it because he did not feel he needed it anymore. I advised he needs to continue this to stabilize his mood we will give 1 more dose of Ativan now he will have his wife pick him up. His troponin now is lower than it was earlier today. EKG normal.       Amount and/or Complexity of Data Reviewed  Clinical lab tests: ordered and reviewed  Tests in the radiology section of CPT®: ordered and reviewed  Independent visualization of images, tracings, or specimens: yes              Orders Placed This Encounter   Procedures    CBC    Comprehensive Metabolic Panel    Troponin    Cardiac Monitor    Pulse Oximetry    EKG 12 Lead    EKG 12 Lead    Saline lock IV        Medications given in the Emergency Department:  Medications   LORazepam (ATIVAN) injection 1 mg (1 mg IntraVENous Given 9/19/22 0039)   LORazepam (ATIVAN) injection 1 mg (1 mg IntraVENous Given 9/19/22 0214)       New Prescriptions    No medications on file        Alka Garcias is a 59 y.o. male who presents to the Emergency Department with chief complaint of    Chief Complaint   Patient presents with    Chest Pain    Panic Attack      Patient coming in with continued anxiety, panic, and chest pain. He states he feels like he is crawling out of his skin.   He was seen earlier today and had 2 cardiac enzymes ER doctor spoke with his cardiologist to stated that they did cath him earlier this year and that there was patent vessels. Patient does have a history of coronary artery disease though. He states that the Ativan they gave him in the ER did help temporarily he tried his routine dose of Xanax tonight but it did not help. He states the pain is in the center of the chest.  Pain is about a 6 out of 10. All other systems reviewed and are negative unless otherwise stated in the History of Present Illness section.        Past Medical History:   Diagnosis Date    Aneurysm ([facility] [address]     Anxiety     Arrhythmia     Arthritis     Back pain     CAD (coronary artery disease)     Followed by [facility], Dr. Amarilys Arias, Nitroglycerin PRN-last used 5/2021    Chronic obstructive pulmonary disease ([facility] [address]     Patient does not have any inhalers at this time, and would like to switch pulmonologist      Chronic pain     Depression     GERD (gastroesophageal reflux disease)     managed with medication     H/O echocardiogram 04/28/2020    EF 60-65%    Hypercholesterolemia     managed with medication     Hypertension     managed with medication     Hypothyroidism     managed with medication     MI (myocardial infarction) ([facility] [address] 2008    2 stents placed at that time, daily 81 mg ASA, followed by Dr. Amarilys Arias     Nausea & vomiting     JORGE on CPAP     Thyroid disease         Past Surgical History:   Procedure Laterality Date    CARDIAC CATHETERIZATION  05/12/2021    GI  05/2021    EGD     LUMBAR FUSION      ORTHOPEDIC SURGERY  1984    elbow infusion    PTCA  2008    stents x 2    TOTAL HIP ARTHROPLASTY Left 09/03/2021    total        Family History   Problem Relation Age of Onset    Heart Disease Father     Prostate Cancer Father     Cancer Mother         Social History     Socioeconomic History    Marital status:      Spouse name: None    Number of children: None    Years of education: None    Highest education level: None   Tobacco Use    Smoking status: Former Packs/day: 1.50     Types: Cigarettes     Quit date: 2008     Years since quittin.7    Smokeless tobacco: Never   Vaping Use    Vaping Use: Never used   Substance and Sexual Activity    Alcohol use: Never    Drug use: Never   Social History Narrative     and lives with wife. Has one dog. Has lived in [address] [place] [address]. Works as a . Allergies: Patient has no known allergies. Previous Medications    ACETAMINOPHEN (TYLENOL) 500 MG TABLET    Take 1,000 mg by mouth every 6 hours as needed    ALBUTEROL (PROVENTIL) (2.5 MG/3ML) 0.083% NEBULIZER SOLUTION    Inhale 2.5 mg into the lungs every 4 hours as needed    ALBUTEROL SULFATE  (90 BASE) MCG/ACT INHALER    Inhale 2 puffs into the lungs every 4 hours as needed    ALPRAZOLAM (XANAX) 0.5 MG TABLET    Take 1-2 tablets by mouth daily as needed for Anxiety. ASPIRIN 81 MG EC TABLET    Take 81 mg by mouth 2 times daily    ATORVASTATIN (LIPITOR) 40 MG TABLET    Take 1 tablet by mouth daily    BUPROPION (WELLBUTRIN XL) 300 MG EXTENDED RELEASE TABLET    Take 1 tablet by mouth every morning    CLOTRIMAZOLE-BETAMETHASONE (LOTRISONE) 1-0.05 % CREAM    Apply topically 2 times daily. FAMOTIDINE (PEPCID) 40 MG TABLET    Take 1 tablet by mouth 2 times daily as needed (heartburn)    FUROSEMIDE (LASIX) 20 MG TABLET    Take 1 tablet by mouth in the morning and 1 tablet before bedtime. Take one tablet as needed if your weight jumps up 2lbs or more or if swelling is severe. Lucyann Push HYDROCHLOROTHIAZIDE (MICROZIDE) 12.5 MG CAPSULE    Take 1 capsule by mouth every morning    LEVOTHYROXINE (SYNTHROID) 75 MCG TABLET    Take 1 tablet by mouth in the morning.     MEMANTINE (NAMENDA) 10 MG TABLET    Take half bid for one month than one bid    METHADONE (DOLOPHINE) 5 MG TABLET    TAKE 1 TABLET BY MOUTH TWICE A DAY MUST LAST 30 DAYS    NAPROXEN SODIUM (ANAPROX) 220 MG TABLET    Take 220 mg by mouth 2 times daily (with meals)    NITROGLYCERIN (NITROSTAT) 0.4 MG SL TABLET    Place 0.4 mg under the tongue as needed    ONDANSETRON (ZOFRAN-ODT) 8 MG TBDP DISINTEGRATING TABLET    Take 1 tablet by mouth every 8 hours as needed for Nausea    OXYGEN    2 lpm cont    PANTOPRAZOLE (PROTONIX) 40 MG TABLET    Take 1 tablet by mouth 2 times daily (before meals)    RESTASIS 0.05 % OPHTHALMIC EMULSION    INSTILL 1 DROP IN BOTH EYES TWICE DAILY    SILDENAFIL (VIAGRA) 50 MG TABLET    Take 1-2 tablets by mouth daily as needed for Erectile Dysfunction    TAMSULOSIN (FLOMAX) 0.4 MG CAPSULE    Take 1 capsule by mouth daily    TESTOSTERONE CYPIONATE (DEPOTESTOTERONE CYPIONATE) 200 MG/ML INJECTION    Inject 1 mL into the muscle once a week. TRIAMTERENE-HYDROCHLOROTHIAZIDE (MAXZIDE-25) 37.5-25 MG PER TABLET    TAKE 1 TABLET BY MOUTH DAILY    ZOLPIDEM (AMBIEN) 10 MG TABLET    Take 1 tablet by mouth every evening. Vitals signs and nursing note reviewed. ED Triage Vitals [09/18/22 2340]   Enc Vitals Group      /73      Heart Rate 92      Resp 16      Temp 98.1 °F (36.7 °C)      Temp Source Oral      SpO2 95 %      Weight 255 lb (115.7 kg)      Height 6' (1.829 m)      Head Circumference       Peak Flow       Pain Score       Pain Loc       Pain Edu? Excl. in [address]? Physical Exam  Vitals and nursing note reviewed. Constitutional:       General: He is not in acute distress. Appearance: He is not ill-appearing, toxic-appearing or diaphoretic. HENT:      Head: Normocephalic and atraumatic. Eyes:      General: No scleral icterus. Conjunctiva/sclera: Conjunctivae normal.   Cardiovascular:      Rate and Rhythm: Normal rate and regular rhythm. Pulmonary:      Effort: Pulmonary effort is normal. No respiratory distress. Breath sounds: No stridor. No wheezing, rhonchi or rales. Chest:      Chest wall: No tenderness (pain feels better with palpation). Abdominal:      Palpations: Abdomen is soft. Tenderness: There is no abdominal tenderness.  There is no guarding or rebound. Hernia: No hernia is present. Musculoskeletal:      Cervical back: Normal range of motion and neck supple. Skin:     Capillary Refill: Capillary refill takes less than 2 seconds. Neurological:      General: No focal deficit present. Mental Status: He is alert and oriented to person, place, and time. Mental status is at baseline. Psychiatric:         Mood and Affect: Mood is anxious.         Procedures    ===================================  ED EKG Interpretation  EKG was interpreted in the absence of a cardiologist.    Rate: 81  EKG Interpretation: EKG Interpretation: sinus rhythm  ST Segments: Normal ST segments - NO STEMI    Courtney Woodson MD 2:19 AM     Results Include:    Recent Results (from the past 24 hour(s))   CBC    Collection Time: 09/18/22  2:38 PM   Result Value Ref Range    WBC 10.8 4.3 - 11.1 K/uL    RBC 5.47 4.23 - 5.6 M/uL    Hemoglobin 15.1 13.6 - 17.2 g/dL    Hematocrit 48.4 41.1 - 50.3 %    MCV 88.5 79.6 - 97.8 FL    MCH 27.6 26.1 - 32.9 PG    MCHC 31.2 (L) 31.4 - 35.0 g/dL    RDW 14.2 11.9 - 14.6 %    Platelets 672 414 - 394 K/uL    MPV 11.3 9.4 - 12.3 FL    nRBC 0.00 0.0 - 0.2 K/uL   Comprehensive Metabolic Panel    Collection Time: 09/18/22  2:38 PM   Result Value Ref Range    Sodium 139 136 - 145 mmol/L    Potassium 3.8 3.5 - 5.1 mmol/L    Chloride 103 101 - 110 mmol/L    CO2 32 21 - 32 mmol/L    Anion Gap 4 4 - 13 mmol/L    Glucose 146 (H) 65 - 100 mg/dL    BUN 13 6 - 23 MG/DL    Creatinine 1.14 0.8 - 1.5 MG/DL    GFR African American >60 >60 ml/min/1.73m2    GFR Non- >60 >60 ml/min/1.73m2    Calcium 8.2 (L) 8.3 - 10.4 MG/DL    Total Bilirubin 0.6 0.2 - 1.1 MG/DL    ALT 23 12 - 65 U/L    AST 16 15 - 37 U/L    Alk Phosphatase 74 50 - 136 U/L    Total Protein 6.5 6.3 - 8.2 g/dL    Albumin 3.2 (L) 3.5 - 5.0 g/dL    Globulin 3.3 2.3 - 3.5 g/dL    Albumin/Globulin Ratio 1.0 (L) 1.2 - 3.5     Troponin    Collection Time: 09/18/22  2:38 PM Result Value Ref Range    Troponin, High Sensitivity 17.9 (H) 0 - 14 pg/mL   Troponin    Collection Time: 09/18/22  5:13 PM   Result Value Ref Range    Troponin, High Sensitivity 23.8 (H) 0 - 14 pg/mL   CBC    Collection Time: 09/19/22 12:35 AM   Result Value Ref Range    WBC 11.5 (H) 4.3 - 11.1 K/uL    RBC 5.40 4.23 - 5.6 M/uL    Hemoglobin 15.0 13.6 - 17.2 g/dL    Hematocrit 47.3 41.1 - 50.3 %    MCV 87.6 79.6 - 97.8 FL    MCH 27.8 26.1 - 32.9 PG    MCHC 31.7 31.4 - 35.0 g/dL    RDW 14.2 11.9 - 14.6 %    Platelets 004 497 - 150 K/uL    MPV 10.9 9.4 - 12.3 FL    nRBC 0.00 0.0 - 0.2 K/uL   Comprehensive Metabolic Panel    Collection Time: 09/19/22 12:35 AM   Result Value Ref Range    Sodium 138 136 - 145 mmol/L    Potassium 3.9 3.5 - 5.1 mmol/L    Chloride 103 101 - 110 mmol/L    CO2 29 21 - 32 mmol/L    Anion Gap 6 4 - 13 mmol/L    Glucose 131 (H) 65 - 100 mg/dL    BUN 12 6 - 23 MG/DL    Creatinine 1.08 0.8 - 1.5 MG/DL    GFR African American >60 >60 ml/min/1.73m2    GFR Non- >60 >60 ml/min/1.73m2    Calcium 8.1 (L) 8.3 - 10.4 MG/DL    Total Bilirubin 0.5 0.2 - 1.1 MG/DL    ALT 23 12 - 65 U/L    AST 14 (L) 15 - 37 U/L    Alk Phosphatase 73 50 - 136 U/L    Total Protein 6.6 6.3 - 8.2 g/dL    Albumin 3.2 (L) 3.5 - 5.0 g/dL    Globulin 3.4 2.3 - 3.5 g/dL    Albumin/Globulin Ratio 0.9 (L) 1.2 - 3.5     Troponin    Collection Time: 09/19/22 12:35 AM   Result Value Ref Range    Troponin, High Sensitivity 14.7 (H) 0 - 14 pg/mL          No orders to display                           Voice dictation software was used during the making of this note. This software is not perfect and grammatical and other typographical errors may be present. This note has not been completely proofread for errors.       Bernice Ramachandran MD  09/19/22 Elvira Dunne MD  09/19/22 [id number]

## 2022-09-19 NOTE — ED NOTES
I have reviewed discharge instructions with the patient. The patient verbalized understanding. Patient left ED via Discharge Method: ambulatory to Home with spouse. Opportunity for questions and clarification provided. Patient given 0 scripts. To continue your aftercare when you leave the hospital, you may receive an automated call from our care team to check in on how you are doing. This is a free service and part of our promise to provide the best care and service to meet your aftercare needs.  If you have questions, or wish to unsubscribe from this service please call [phone]. Thank you for Choosing our [facility] Emergency Department.         Conrad Reece RN  09/19/22 [id number]

## 2022-09-19 NOTE — DISCHARGE INSTRUCTIONS
Please continue taking the Wellbutrin as previously prescribed. I believe this may help the anxiety and mood.

## 2022-09-21 ENCOUNTER — TELEPHONE (OUTPATIENT)
Dept: NEUROLOGY | Age: 57
End: 2022-09-21

## 2022-09-21 ENCOUNTER — TELEMEDICINE (OUTPATIENT)
Dept: FAMILY MEDICINE CLINIC | Facility: CLINIC | Age: 57
End: 2022-09-21
Payer: COMMERCIAL

## 2022-09-21 ENCOUNTER — TELEPHONE (OUTPATIENT)
Dept: FAMILY MEDICINE CLINIC | Facility: CLINIC | Age: 57
End: 2022-09-21

## 2022-09-21 DIAGNOSIS — Z79.899 CHRONIC PRESCRIPTION BENZODIAZEPINE USE: ICD-10-CM

## 2022-09-21 DIAGNOSIS — F41.0 PANIC ANXIETY SYNDROME: ICD-10-CM

## 2022-09-21 DIAGNOSIS — F33.9 RECURRENT MAJOR DEPRESSIVE DISORDER, REMISSION STATUS UNSPECIFIED (HCC): Primary | ICD-10-CM

## 2022-09-21 PROCEDURE — 1036F TOBACCO NON-USER: CPT | Performed by: FAMILY MEDICINE

## 2022-09-21 PROCEDURE — G8427 DOCREV CUR MEDS BY ELIG CLIN: HCPCS | Performed by: FAMILY MEDICINE

## 2022-09-21 PROCEDURE — G8417 CALC BMI ABV UP PARAM F/U: HCPCS | Performed by: FAMILY MEDICINE

## 2022-09-21 PROCEDURE — 99215 OFFICE O/P EST HI 40 MIN: CPT | Performed by: FAMILY MEDICINE

## 2022-09-21 PROCEDURE — 3017F COLORECTAL CA SCREEN DOC REV: CPT | Performed by: FAMILY MEDICINE

## 2022-09-21 RX ORDER — LISINOPRIL AND HYDROCHLOROTHIAZIDE 12.5; 1 MG/1; MG/1
TABLET ORAL
COMMUNITY
Start: 2022-09-20 | End: 2022-10-24 | Stop reason: SDUPTHER

## 2022-09-21 RX ORDER — ALPRAZOLAM 1 MG/1
1-2 TABLET ORAL DAILY PRN
Qty: 30 TABLET | Refills: 5 | Status: SHIPPED | OUTPATIENT
Start: 2022-09-21 | End: 2022-10-24 | Stop reason: SDUPTHER

## 2022-09-21 NOTE — TELEPHONE ENCOUNTER
Patient called stating that Namenda 10 mg has been giving him severe panic attacks, states that he has been to the ER at least 4 times in the last week. I spoke with Keiko Lee and was advised to tell the patient to go to ER or call PCP. Dr. Delta Adams was standing there and agreed with advice. Patient was advised to either go back to the ER or call PCP due to office handling issue and was unable to speak with patient. Patient verbalized understanding.  Patient is requesting callback as soon as possible

## 2022-09-21 NOTE — TELEPHONE ENCOUNTER
If BP is up that have & he's having panic sensation that's not responding to Xanax then he should go to the ER. As he's been having Psych issues for some time now & what I'm treating him with has not seemed to be helping much, I will also place an urgent referral for him to be evaluated by a Psychiatrist as well.

## 2022-09-21 NOTE — PROGRESS NOTES
[address]  Phone: [phone]  Fax: [phone]  [email or web address]      Encounter FigueroaJacquie Segundo [address]; Established patient 59 y.o.male; seen 9/21/2022 for: Anxiety and Other (Panic attacks)      Assessment & Plan    1. Chronic prescription benzodiazepine use  -     ALPRAZolam (XANAX) 1 MG tablet; Take 1-2 tablets by mouth daily as needed for Anxiety. , Disp-30 tablet, R-5Normal  2. Panic anxiety syndrome  -     ALPRAZolam (XANAX) 1 MG tablet; Take 1-2 tablets by mouth daily as needed for Anxiety. , Disp-30 tablet, R-5Normal    Problem and/or Symptoms are currently not stable and/or well controlled on current treatment plan. Will have patient follow up as directed and make the following changes for further evaluation and/or treatment:     Advised patient to restart Wellbutrin and am adjusting dose such as Xanax from 0.5 to 1 mg dose to 1 mg to 2 mg dose as needed. Also placing urgent referral for patient to be seen by psychiatrist as we have previously tried him on multiple psychiatric medications, most of which are either intolerant or ineffective for him. Hope that psychiatric expert second opinion will have some suggestions for other treatment options. Check Out Instructions  Return if symptoms worsen or fail to improve. Subjective & Objective    HPI  Pt having severe & frequent panic attacks recently; seen in ER multiple times & has f/u with cardiologist for repeat stress test, although cardiologist does not think this is related to his heart. Patient was recently started on some new medications from his neurologist as well as stopping his Wellbutrin, both within the last several weeks, which is just prior to the onset of this severe and recurring panic attack issue.     Review of Systems     Physical Exam  Patient-Reported Vitals 9/21/2022   Patient-Reported Weight 255   Patient-Reported Height 6.1   Patient-Reported Systolic 154 Patient-Reported Diastolic 55   Patient-Reported Pulse 145   Patient-Reported Temperature 97.6   Patient-Reported SpO2 97 with oxygen   Patient-Reported Peak Flow -       BP Readings from Last 3 Encounters:   09/19/22 121/80   09/18/22 134/76   09/15/22 (!) 152/88     Wt Readings from Last 3 Encounters:   09/18/22 255 lb (115.7 kg)   09/18/22 255 lb (115.7 kg)   09/14/22 252 lb (114.3 kg)     There is no height or weight on file to calculate BMI. We discussed the typical prognosis and potential complications of the concern(s), including treatment options. Medication risks, benefits, costs, interactions, and alternatives were discussed as appropriate. I advised him to contact the office if his condition worsens or fails to improve as anticipated. He expressed understanding with the discussion and plan of care. Barbara Mcpherson, was evaluated through a synchronous (real-time) audio and/or video encounter. The patient (or guardian if applicable) is aware that this is a billable service, which includes applicable co-pays. This Virtual Visit was conducted with patient's (and/or legal guardian's) consent. The visit was conducted pursuant to the emergency declaration under the [address] [place] [address], [address] [place] [address] authority and the [employer] General Act. Patient identification was verified, and a caregiver was present when appropriate. The patient was located at Home: [address] [place], [place] [id number]. Provider was located at [facility] (Appt Dept): [id number] [place] [address]. An electronic signature was used to authenticate this note.   -- Neel Bentley MD

## 2022-09-21 NOTE — TELEPHONE ENCOUNTER
Patient is on the way to the ER. He is having severe panic attacks. Does not know what is causing them. Something is setting them off. BP is up again 195/90. Did take Xxanx to calm him down? Left side of chest hurts. He is uncomfortable. And not making very good decisions. Wife said that something is wrong with him. 2 new medications was started 2 weeks for onset dementia. Started after the new medications but does not know if it is causing the panic attacks. Reached out to the neurologist but they have not called back yet. What to know what he needs to do. Tried telling him that he has an appointment at 1:20PM but feels like he cant wait until then.  Stated that the ER is packed right now and wants to speak with you but has an appointment at 1:20PM.

## 2022-09-22 ENCOUNTER — TELEPHONE (OUTPATIENT)
Dept: NEUROLOGY | Age: 57
End: 2022-09-22

## 2022-09-22 NOTE — TELEPHONE ENCOUNTER
Message received from patient report side effects of \"severe panic attacks\" since starting meds recently prescribed. Patient reports having gone to the ER x 3 and wants to speak with Dr. Tramaine Witt.     Patient can be reached at [phone]

## 2022-09-22 NOTE — TELEPHONE ENCOUNTER
I spoke to him yesterday in ER getting his panic attack checked and there is a consern with elevated Cardiac enzymes.  We will d/c Namenda

## 2022-09-26 ENCOUNTER — TELEPHONE (OUTPATIENT)
Dept: NEUROLOGY | Age: 57
End: 2022-09-26

## 2022-09-27 ENCOUNTER — TELEPHONE (OUTPATIENT)
Dept: NEUROLOGY | Age: 57
End: 2022-09-27

## 2022-09-27 ENCOUNTER — TELEMEDICINE (OUTPATIENT)
Dept: BEHAVIORAL/MENTAL HEALTH CLINIC | Age: 57
End: 2022-09-27
Payer: COMMERCIAL

## 2022-09-27 DIAGNOSIS — F33.1 MDD (MAJOR DEPRESSIVE DISORDER), RECURRENT EPISODE, MODERATE (HCC): ICD-10-CM

## 2022-09-27 DIAGNOSIS — F41.0 PANIC DISORDER: Primary | ICD-10-CM

## 2022-09-27 PROCEDURE — 99205 OFFICE O/P NEW HI 60 MIN: CPT | Performed by: STUDENT IN AN ORGANIZED HEALTH CARE EDUCATION/TRAINING PROGRAM

## 2022-09-27 PROCEDURE — 99417 PROLNG OP E/M EACH 15 MIN: CPT | Performed by: STUDENT IN AN ORGANIZED HEALTH CARE EDUCATION/TRAINING PROGRAM

## 2022-09-27 RX ORDER — FLUOXETINE HYDROCHLORIDE 20 MG/1
20 CAPSULE ORAL DAILY
Qty: 30 CAPSULE | Refills: 2 | Status: SHIPPED | OUTPATIENT
Start: 2022-09-27 | End: 2022-10-24 | Stop reason: SDUPTHER

## 2022-09-27 ASSESSMENT — ANXIETY QUESTIONNAIRES
6. BECOMING EASILY ANNOYED OR IRRITABLE: 2
4. TROUBLE RELAXING: NEARLY EVERY DAY
2. NOT BEING ABLE TO STOP OR CONTROL WORRYING: NEARLY EVERY DAY
5. BEING SO RESTLESS THAT IT IS HARD TO SIT STILL: SEVERAL DAYS
3. WORRYING TOO MUCH ABOUT DIFFERENT THINGS: NEARLY EVERY DAY
7. FEELING AFRAID AS IF SOMETHING AWFUL MIGHT HAPPEN: 1
7. FEELING AFRAID AS IF SOMETHING AWFUL MIGHT HAPPEN: SEVERAL DAYS
GAD7 TOTAL SCORE: 16
1. FEELING NERVOUS, ANXIOUS, OR ON EDGE: NEARLY EVERY DAY
IF YOU CHECKED OFF ANY PROBLEMS ON THIS QUESTIONNAIRE, HOW DIFFICULT HAVE THESE PROBLEMS MADE IT FOR YOU TO DO YOUR WORK, TAKE CARE OF THINGS AT HOME, OR GET ALONG WITH OTHER PEOPLE: EXTREMELY DIFFICULT
6. BECOMING EASILY ANNOYED OR IRRITABLE: MORE THAN HALF THE DAYS
2. NOT BEING ABLE TO STOP OR CONTROL WORRYING: 3
5. BEING SO RESTLESS THAT IT IS HARD TO SIT STILL: 1
3. WORRYING TOO MUCH ABOUT DIFFERENT THINGS: 3
IF YOU CHECKED OFF ANY PROBLEMS ON THIS QUESTIONNAIRE, HOW DIFFICULT HAVE THESE PROBLEMS MADE IT FOR YOU TO DO YOUR WORK, TAKE CARE OF THINGS AT HOME, OR GET ALONG WITH OTHER PEOPLE: EXTREMELY DIFFICULT
1. FEELING NERVOUS, ANXIOUS, OR ON EDGE: 3
4. TROUBLE RELAXING: 3

## 2022-09-27 ASSESSMENT — LIFESTYLE VARIABLES
HISTORY_ALCOHOL_USE: NO
PAST THREE MONTHS WHAT IS THE LARGEST AMOUNT OF ALCOHOLIC DRINKS YOU HAVE CONSUMED IN ONE DAY: 0
HAVE YOU EVER RECEIVED ALCOHOL OR OTHER DRUG ABUSE TREATMENT: NO
ALCOHOL_DAYS_PER_WEEK: 0

## 2022-09-27 NOTE — Clinical Note
Melissa Blackburn! I met with Magalis Amaral this afternoon and will be starting a trial of Prozac. Hopefully we'll be able to find something that will help prevent his panic attacks more effectively, but he has been instructed to continue PRN use of Xanax for the time being. He understands that the goal is to find something that works so that he can wean off of the BZO (and hopefully Wellbutrin too). Thanks!   Jaquan Ellsworth
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
## 2022-09-27 NOTE — TELEPHONE ENCOUNTER
Patient said that social security is questioning his MRI he had done that showed white matter. They are wanting to know if any follow up testing was done. Should he have follow up testing? He said that he is filing social security for lung disease so he is not sure why they are questioning this.

## 2022-09-28 ENCOUNTER — TELEPHONE (OUTPATIENT)
Dept: SLEEP MEDICINE | Age: 57
End: 2022-09-28

## 2022-09-28 DIAGNOSIS — G47.33 OSA (OBSTRUCTIVE SLEEP APNEA): Primary | ICD-10-CM

## 2022-09-28 NOTE — TELEPHONE ENCOUNTER
Patient informed via [facility] communication.

## 2022-10-02 ENCOUNTER — HOSPITAL ENCOUNTER (EMERGENCY)
Age: 57
Discharge: HOME | End: 2022-10-02
Attending: EMERGENCY MEDICINE
Payer: COMMERCIAL

## 2022-10-02 ENCOUNTER — HOSPITAL ENCOUNTER (EMERGENCY)
Dept: GENERAL RADIOLOGY | Age: 57
Discharge: HOME | End: 2022-10-05
Payer: COMMERCIAL

## 2022-10-02 VITALS
DIASTOLIC BLOOD PRESSURE: 70 MMHG | BODY MASS INDEX: 33.86 KG/M2 | WEIGHT: 250 LBS | SYSTOLIC BLOOD PRESSURE: 121 MMHG | RESPIRATION RATE: 16 BRPM | OXYGEN SATURATION: 98 % | HEART RATE: 64 BPM | HEIGHT: 72 IN | TEMPERATURE: 97.8 F

## 2022-10-02 DIAGNOSIS — F41.1 ANXIETY STATE: ICD-10-CM

## 2022-10-02 DIAGNOSIS — R07.9 ACUTE CHEST PAIN: Primary | ICD-10-CM

## 2022-10-02 LAB
ALBUMIN SERPL-MCNC: 3.1 G/DL (ref 3.5–5)
ALBUMIN/GLOB SERPL: 0.8 {RATIO} (ref 1.2–3.5)
ALP SERPL-CCNC: 77 U/L (ref 50–136)
ALT SERPL-CCNC: 26 U/L (ref 12–65)
ANION GAP SERPL CALC-SCNC: 5 MMOL/L (ref 4–13)
AST SERPL-CCNC: 17 U/L (ref 15–37)
BILIRUB SERPL-MCNC: 0.6 MG/DL (ref 0.2–1.1)
BUN SERPL-MCNC: 12 MG/DL (ref 6–23)
CALCIUM SERPL-MCNC: 8.6 MG/DL (ref 8.3–10.4)
CHLORIDE SERPL-SCNC: 103 MMOL/L (ref 101–110)
CO2 SERPL-SCNC: 31 MMOL/L (ref 21–32)
CREAT SERPL-MCNC: 1.17 MG/DL (ref 0.8–1.5)
EKG ATRIAL RATE: 86 BPM
EKG DIAGNOSIS: NORMAL
EKG P AXIS: 28 DEGREES
EKG P-R INTERVAL: 160 MS
EKG Q-T INTERVAL: 358 MS
EKG QRS DURATION: 88 MS
EKG QTC CALCULATION (BAZETT): 428 MS
EKG R AXIS: 61 DEGREES
EKG T AXIS: 59 DEGREES
EKG VENTRICULAR RATE: 86 BPM
ERYTHROCYTE [DISTWIDTH] IN BLOOD BY AUTOMATED COUNT: 14.2 % (ref 11.9–14.6)
GLOBULIN SER CALC-MCNC: 3.8 G/DL (ref 2.3–3.5)
GLUCOSE SERPL-MCNC: 122 MG/DL (ref 65–100)
HCT VFR BLD AUTO: 48 % (ref 41.1–50.3)
HGB BLD-MCNC: 15.1 G/DL (ref 13.6–17.2)
MCH RBC QN AUTO: 27.2 PG (ref 26.1–32.9)
MCHC RBC AUTO-ENTMCNC: 31.5 G/DL (ref 31.4–35)
MCV RBC AUTO: 86.3 FL (ref 79.6–97.8)
NRBC # BLD: 0 K/UL (ref 0–0.2)
PLATELET # BLD AUTO: 207 K/UL (ref 150–450)
PMV BLD AUTO: 11.8 FL (ref 9.4–12.3)
POTASSIUM SERPL-SCNC: 3.6 MMOL/L (ref 3.5–5.1)
PROT SERPL-MCNC: 6.9 G/DL (ref 6.3–8.2)
RBC # BLD AUTO: 5.56 M/UL (ref 4.23–5.6)
SODIUM SERPL-SCNC: 139 MMOL/L (ref 136–145)
TROPONIN I SERPL HS-MCNC: 8.3 PG/ML (ref 0–14)
TROPONIN I SERPL HS-MCNC: 8.9 PG/ML (ref 0–14)
WBC # BLD AUTO: 9.8 K/UL (ref 4.3–11.1)

## 2022-10-02 PROCEDURE — 36415 COLL VENOUS BLD VENIPUNCTURE: CPT

## 2022-10-02 PROCEDURE — 84484 ASSAY OF TROPONIN QUANT: CPT

## 2022-10-02 PROCEDURE — 85027 COMPLETE CBC AUTOMATED: CPT

## 2022-10-02 PROCEDURE — 99285 EMERGENCY DEPT VISIT HI MDM: CPT

## 2022-10-02 PROCEDURE — 71046 X-RAY EXAM CHEST 2 VIEWS: CPT

## 2022-10-02 PROCEDURE — 96374 THER/PROPH/DIAG INJ IV PUSH: CPT

## 2022-10-02 PROCEDURE — 80053 COMPREHEN METABOLIC PANEL: CPT

## 2022-10-02 PROCEDURE — 93005 ELECTROCARDIOGRAM TRACING: CPT | Performed by: EMERGENCY MEDICINE

## 2022-10-02 PROCEDURE — 6360000002 HC RX W HCPCS: Performed by: EMERGENCY MEDICINE

## 2022-10-02 RX ORDER — LORAZEPAM 2 MG/ML
1 INJECTION INTRAMUSCULAR ONCE
Status: COMPLETED | OUTPATIENT
Start: 2022-10-02 | End: 2022-10-02

## 2022-10-02 RX ADMIN — LORAZEPAM 1 MG: 2 INJECTION, SOLUTION INTRAMUSCULAR; INTRAVENOUS at 18:16

## 2022-10-02 ASSESSMENT — PAIN SCALES - GENERAL
PAINLEVEL_OUTOF10: 7
PAINLEVEL_OUTOF10: 0

## 2022-10-02 ASSESSMENT — ENCOUNTER SYMPTOMS
NAUSEA: 0
ABDOMINAL PAIN: 0
BACK PAIN: 0
VOMITING: 0
SHORTNESS OF BREATH: 0
COUGH: 0

## 2022-10-02 ASSESSMENT — PAIN - FUNCTIONAL ASSESSMENT
PAIN_FUNCTIONAL_ASSESSMENT: 0-10
PAIN_FUNCTIONAL_ASSESSMENT: 0-10

## 2022-10-02 ASSESSMENT — PAIN DESCRIPTION - LOCATION: LOCATION: CHEST

## 2022-10-02 NOTE — ED PROVIDER NOTES
Emergency Department Provider Note                   PCP:                Brian Mccall MD               Age: 59 y.o. Sex: male     No diagnosis found. DISPOSITION          MDM  Number of Diagnoses or Management Options  Diagnosis management comments: Chest pain patient with a history of panic disorder and COPD and coronary artery disease. Check EKG and serial troponins. IV Ativan for anxiety. Check chest x-ray for infiltrate effusion or pneumothorax. Amount and/or Complexity of Data Reviewed  Clinical lab tests: ordered and reviewed  Tests in the radiology section of CPT®: ordered and reviewed  Tests in the medicine section of CPT®: ordered and reviewed  Review and summarize past medical records: yes  Independent visualization of images, tracings, or specimens: yes (My interpretation EKG shows normal sinus rhythm at 86. No ST-T changes. No ectopy. Normal QT interval.)    Risk of Complications, Morbidity, and/or Mortality  Presenting problems: moderate  Diagnostic procedures: minimal  Management options: low               Orders Placed This Encounter   Procedures    XR CHEST (2 VW)    CBC    Comprehensive Metabolic Panel    Troponin    Cardiac Monitor    Pulse Oximetry    EKG 12 Lead    Saline lock IV        Medications   LORazepam (ATIVAN) injection 1 mg (has no administration in time range)       New Prescriptions    No medications on file        Andrea Cramer is a 59 y.o. male who presents to the Emergency Department with chief complaint of    Chief Complaint   Patient presents with    Chest Pain      57-year-old male presents with onset of chest pain about 2 hours ago. Went away with nitroglycerin. Patient states panic attack started this episode of pain. There is similar to previous panic attacks. Pain is left sternal.  Described as aching. No radiation. No nausea vomiting or diaphoresis. Some slight shortness of breath but not pleuritic. No history of DVT or PE.   Patient has a history of coronary artery disease with stenting in the past.  Is on 3 L oxygen continuously for COPD. He has taken a number of medications for his anxiety. Review of records reveals 3-4 visits this month for chest pain. Felt to have component of anxiety. Did see his cardiologist.  Evidently this nuclear medicine test was done. Results seem to show mild amount of posterior ischemia. Patient states he discussed this with his cardiologist and they said there was no intervention that needed to be done at this time and it was medical management. The history is provided by the patient. Chest Pain  Pain location:  L chest  Pain quality: dull    Pain quality: not aching    Pain radiates to:  Does not radiate  Pain severity:  Moderate  Onset quality:  Sudden  Duration:  3 hours  Progression:  Waxing and waning  Chronicity:  Recurrent  Context: not breathing    Relieved by:  Nitroglycerin  Worsened by:  Nothing  Associated symptoms: no abdominal pain, no back pain, no cough, no fever, no nausea, no palpitations, no shortness of breath, no syncope and no vomiting        Review of Systems   Constitutional:  Negative for chills and fever. Respiratory:  Negative for cough and shortness of breath. Cardiovascular:  Positive for chest pain. Negative for palpitations and syncope. Gastrointestinal:  Negative for abdominal pain, nausea and vomiting. Musculoskeletal:  Negative for back pain. All other systems reviewed and are negative.     Past Medical History:   Diagnosis Date    Aneurysm ([address]     Anxiety     Arrhythmia     Arthritis     Back pain     CAD (coronary artery disease)     Followed by [facility], Dr. Dano Alcala, Nitroglycerin PRN-last used 5/2021    Chronic obstructive pulmonary disease ([facility] [address]     Patient does not have any inhalers at this time, and would like to switch pulmonologist      Chronic pain     Depression     GERD (gastroesophageal reflux disease)     managed with medication     H/O echocardiogram 2020    EF 60-65%    Hypercholesterolemia     managed with medication     Hypertension     managed with medication     Hypothyroidism     managed with medication     MI (myocardial infarction) ([facility] [place] [address]     2 stents placed at that time, daily 81 mg ASA, followed by Dr. Anthony Campa     Nausea & vomiting     JORGE on CPAP     Thyroid disease         Past Surgical History:   Procedure Laterality Date    CARDIAC CATHETERIZATION  2021    GI  2021    EGD     LUMBAR FUSION      ORTHOPEDIC SURGERY  1984    elbow infusion    PTCA  2008    stents x 2    TOTAL HIP ARTHROPLASTY Left 2021    total        Family History   Problem Relation Age of Onset    Heart Disease Father     Prostate Cancer Father     Cancer Mother         Social History     Socioeconomic History    Marital status:    Tobacco Use    Smoking status: Former     Packs/day: 1.50     Types: Cigarettes     Quit date: 2008     Years since quittin.7    Smokeless tobacco: Never   Vaping Use    Vaping Use: Never used   Substance and Sexual Activity    Alcohol use: Never    Drug use: Never   Social History Narrative     and lives with wife. Has one dog. Has lived in [place] and [place]. Works as a . Patient has no known allergies. Previous Medications    ACETAMINOPHEN (TYLENOL) 500 MG TABLET    Take 1,000 mg by mouth every 6 hours as needed    ALBUTEROL (PROVENTIL) (2.5 MG/3ML) 0.083% NEBULIZER SOLUTION    Inhale 2.5 mg into the lungs every 4 hours as needed    ALBUTEROL SULFATE  (90 BASE) MCG/ACT INHALER    Inhale 2 puffs into the lungs every 4 hours as needed    ALPRAZOLAM (XANAX) 1 MG TABLET    Take 1-2 tablets by mouth daily as needed for Anxiety.     ASPIRIN 81 MG EC TABLET    Take 81 mg by mouth 2 times daily    ATORVASTATIN (LIPITOR) 40 MG TABLET    Take 1 tablet by mouth daily    BUPROPION (WELLBUTRIN XL) 300 MG EXTENDED RELEASE TABLET    Take 1 tablet by mouth every morning CLOTRIMAZOLE-BETAMETHASONE (LOTRISONE) 1-0.05 % CREAM    Apply topically 2 times daily. FAMOTIDINE (PEPCID) 40 MG TABLET    Take 1 tablet by mouth 2 times daily as needed (heartburn)    FLUOXETINE (PROZAC) 20 MG CAPSULE    Take 1 capsule by mouth daily    FUROSEMIDE (LASIX) 20 MG TABLET    Take 1 tablet by mouth in the morning and 1 tablet before bedtime. Take one tablet as needed if your weight jumps up 2lbs or more or if swelling is severe. [place] HYDROCHLOROTHIAZIDE (MICROZIDE) 12.5 MG CAPSULE    Take 1 capsule by mouth every morning    LEVOTHYROXINE (SYNTHROID) 75 MCG TABLET    Take 1 tablet by mouth in the morning. LISINOPRIL-HYDROCHLOROTHIAZIDE (PRINZIDE;ZESTORETIC) 10-12.5 MG PER TABLET        MEMANTINE (NAMENDA) 10 MG TABLET    Take half bid for one month than one bid    METHADONE (DOLOPHINE) 5 MG TABLET    TAKE 1 TABLET BY MOUTH TWICE A DAY MUST LAST 30 DAYS    NAPROXEN SODIUM (ANAPROX) 220 MG TABLET    Take 220 mg by mouth 2 times daily (with meals)    NITROGLYCERIN (NITROSTAT) 0.4 MG SL TABLET    Place 0.4 mg under the tongue as needed    ONDANSETRON (ZOFRAN-ODT) 8 MG TBDP DISINTEGRATING TABLET    Take 1 tablet by mouth every 8 hours as needed for Nausea    OXYGEN    2 lpm cont    PANTOPRAZOLE (PROTONIX) 40 MG TABLET    Take 1 tablet by mouth 2 times daily (before meals)    RESTASIS 0.05 % OPHTHALMIC EMULSION    INSTILL 1 DROP IN BOTH EYES TWICE DAILY    SILDENAFIL (VIAGRA) 50 MG TABLET    Take 1-2 tablets by mouth daily as needed for Erectile Dysfunction    TAMSULOSIN (FLOMAX) 0.4 MG CAPSULE    Take 1 capsule by mouth daily    TESTOSTERONE CYPIONATE (DEPOTESTOTERONE CYPIONATE) 200 MG/ML INJECTION    Inject 1 mL into the muscle once a week. TRIAMTERENE-HYDROCHLOROTHIAZIDE (MAXZIDE-25) 37.5-25 MG PER TABLET    TAKE 1 TABLET BY MOUTH DAILY    ZOLPIDEM (AMBIEN) 10 MG TABLET    Take 1 tablet by mouth every evening. Vitals signs and nursing note reviewed.    Patient Vitals for the past 4 hrs: Temp Pulse Resp BP SpO2   10/02/22 1519 97.8 °F (36.6 °C) 84 17 121/76 98 %          Physical Exam  Vitals and nursing note reviewed. Constitutional:       General: He is in acute distress (States anxiety). Appearance: He is not ill-appearing. HENT:      Head: Normocephalic and atraumatic. Right Ear: External ear normal.      Left Ear: External ear normal.      Mouth/Throat:      Mouth: Mucous membranes are moist.      Pharynx: Oropharynx is clear. Eyes:      General: No scleral icterus. Extraocular Movements: Extraocular movements intact. Pupils: Pupils are equal, round, and reactive to light. Cardiovascular:      Rate and Rhythm: Normal rate and regular rhythm. Pulmonary:      Effort: Pulmonary effort is normal.      Breath sounds: Normal breath sounds. Musculoskeletal:         General: No swelling or tenderness. Cervical back: Normal range of motion and neck supple. Right lower leg: No edema. Left lower leg: No edema. Skin:     General: Skin is warm and dry. Neurological:      General: No focal deficit present. Mental Status: He is alert. Procedures    Results for orders placed or performed during the hospital encounter of 10/02/22   XR CHEST (2 VW)    Narrative    Chest 2 view    CLINICAL INDICATION: Acute moderate substernal chest pain, elevated troponin    COMPARISON: Radiograph 9/18/2022 and CT 8/1/2022    TECHNIQUE: Upright PA  and lateral views of the chest     FINDINGS: Lung volumes are well inflated. Cardiomediastinal silhouette and  hilar contours are stable. The lungs are unchanged demonstrating no  consolidation, pleural effusion, CHF or pneumothorax. No acute osseous abnormalities are seen. Impression    No acute airspace disease.      CBC   Result Value Ref Range    WBC 9.8 4.3 - 11.1 K/uL    RBC 5.56 4.23 - 5.6 M/uL    Hemoglobin 15.1 13.6 - 17.2 g/dL    Hematocrit 48.0 41.1 - 50.3 %    MCV 86.3 79.6 - 97.8 FL    MCH 27.2 26.1 - 32.9 PG    MCHC 31.5 31.4 - 35.0 g/dL    RDW 14.2 11.9 - 14.6 %    Platelets 298 601 - 131 K/uL    MPV 11.8 9.4 - 12.3 FL    nRBC 0.00 0.0 - 0.2 K/uL   Comprehensive Metabolic Panel   Result Value Ref Range    Sodium 139 136 - 145 mmol/L    Potassium 3.6 3.5 - 5.1 mmol/L    Chloride 103 101 - 110 mmol/L    CO2 31 21 - 32 mmol/L    Anion Gap 5 4 - 13 mmol/L    Glucose 122 (H) 65 - 100 mg/dL    BUN 12 6 - 23 MG/DL    Creatinine 1.17 0.8 - 1.5 MG/DL    GFR African American >60 >60 ml/min/1.73m2    GFR Non- >60 >60 ml/min/1.73m2    Calcium 8.6 8.3 - 10.4 MG/DL    Total Bilirubin 0.6 0.2 - 1.1 MG/DL    ALT 26 12 - 65 U/L    AST 17 15 - 37 U/L    Alk Phosphatase 77 50 - 136 U/L    Total Protein 6.9 6.3 - 8.2 g/dL    Albumin 3.1 (L) 3.5 - 5.0 g/dL    Globulin 3.8 (H) 2.3 - 3.5 g/dL    Albumin/Globulin Ratio 0.8 (L) 1.2 - 3.5     Troponin   Result Value Ref Range    Troponin, High Sensitivity 8.3 0 - 14 pg/mL   EKG 12 Lead   Result Value Ref Range    Ventricular Rate 86 BPM    Atrial Rate 86 BPM    P-R Interval 160 ms    QRS Duration 88 ms    Q-T Interval 358 ms    QTc Calculation (Bazett) 428 ms    P Axis 28 degrees    R Axis 61 degrees    T Axis 59 degrees    Diagnosis Normal sinus rhythm         XR CHEST (2 VW)   Final Result   No acute airspace disease.                 Results Include:    Recent Results (from the past 24 hour(s))   EKG 12 Lead    Collection Time: 10/02/22  3:09 PM   Result Value Ref Range    Ventricular Rate 86 BPM    Atrial Rate 86 BPM    P-R Interval 160 ms    QRS Duration 88 ms    Q-T Interval 358 ms    QTc Calculation (Bazett) 428 ms    P Axis 28 degrees    R Axis 61 degrees    T Axis 59 degrees    Diagnosis Normal sinus rhythm    CBC    Collection Time: 10/02/22  3:14 PM   Result Value Ref Range    WBC 9.8 4.3 - 11.1 K/uL    RBC 5.56 4.23 - 5.6 M/uL    Hemoglobin 15.1 13.6 - 17.2 g/dL    Hematocrit 48.0 41.1 - 50.3 %    MCV 86.3 79.6 - 97.8 FL    MCH 27.2 26.1 - 32.9 PG MCHC 31.5 31.4 - 35.0 g/dL    RDW 14.2 11.9 - 14.6 %    Platelets 817 169 - 675 K/uL    MPV 11.8 9.4 - 12.3 FL    nRBC 0.00 0.0 - 0.2 K/uL   Comprehensive Metabolic Panel    Collection Time: 10/02/22  3:14 PM   Result Value Ref Range    Sodium 139 136 - 145 mmol/L    Potassium 3.6 3.5 - 5.1 mmol/L    Chloride 103 101 - 110 mmol/L    CO2 31 21 - 32 mmol/L    Anion Gap 5 4 - 13 mmol/L    Glucose 122 (H) 65 - 100 mg/dL    BUN 12 6 - 23 MG/DL    Creatinine 1.17 0.8 - 1.5 MG/DL    GFR African American >60 >60 ml/min/1.73m2    GFR Non- >60 >60 ml/min/1.73m2    Calcium 8.6 8.3 - 10.4 MG/DL    Total Bilirubin 0.6 0.2 - 1.1 MG/DL    ALT 26 12 - 65 U/L    AST 17 15 - 37 U/L    Alk Phosphatase 77 50 - 136 U/L    Total Protein 6.9 6.3 - 8.2 g/dL    Albumin 3.1 (L) 3.5 - 5.0 g/dL    Globulin 3.8 (H) 2.3 - 3.5 g/dL    Albumin/Globulin Ratio 0.8 (L) 1.2 - 3.5     Troponin    Collection Time: 10/02/22  3:14 PM   Result Value Ref Range    Troponin, High Sensitivity 8.3 0 - 14 pg/mL   Troponin    Collection Time: 10/02/22  6:22 PM   Result Value Ref Range    Troponin, High Sensitivity 8.9 0 - 14 pg/mL     Patient states symptoms vastly improved. Feels ready for discharge. Voice dictation software was used during the making of this note. This software is not perfect and grammatical and other typographical errors may be present. This note has not been completely proofread for errors.      Gloria Persaud MD  10/02/22 5303       Gloria Persaud MD  10/02/22 4639

## 2022-10-02 NOTE — DISCHARGE INSTRUCTIONS
Consider calling your cardiologist to recheck. Also recheck with your primary care doctor regarding ongoing anxiety issues. Recheck if chest pain recurs and not relieved by 3 nitroglycerin.

## 2022-10-02 NOTE — ED TRIAGE NOTES
Pt c/o chest pain that started thirty minutes ago. Pt states he was playing a video game when it started. Pt states he has panic attacks. Pt states he took a nitro 15 minutes ago.  Pt states he wears 3L NC

## 2022-10-03 ENCOUNTER — HOSPITAL ENCOUNTER (EMERGENCY)
Age: 57
Discharge: HOME | End: 2022-10-03
Attending: EMERGENCY MEDICINE
Payer: COMMERCIAL

## 2022-10-03 VITALS
WEIGHT: 255 LBS | BODY MASS INDEX: 34.54 KG/M2 | HEART RATE: 95 BPM | RESPIRATION RATE: 15 BRPM | SYSTOLIC BLOOD PRESSURE: 128 MMHG | HEIGHT: 72 IN | DIASTOLIC BLOOD PRESSURE: 65 MMHG | OXYGEN SATURATION: 99 % | TEMPERATURE: 97.8 F

## 2022-10-03 DIAGNOSIS — F41.1 ANXIETY STATE: Primary | ICD-10-CM

## 2022-10-03 LAB
ARTERIAL PATENCY WRIST A: POSITIVE
BASE EXCESS BLD CALC-SCNC: 3.1 MMOL/L
HCO3 BLD-SCNC: 29.2 MMOL/L (ref 22–26)
PCO2 BLD: 48.6 MMHG (ref 35–45)
PH BLD: 7.39 [PH] (ref 7.35–7.45)
PO2 BLD: 88 MMHG (ref 75–100)
POC FIO2: 3
SAO2 % BLD: 96.5 % (ref 95–98)
SERVICE CMNT-IMP: ABNORMAL
SPECIMEN TYPE: ABNORMAL
T4 FREE SERPL-MCNC: 1.2 NG/DL (ref 0.78–1.46)
TSH, 3RD GENERATION: 2.77 UIU/ML (ref 0.36–3.74)

## 2022-10-03 PROCEDURE — 36600 WITHDRAWAL OF ARTERIAL BLOOD: CPT

## 2022-10-03 PROCEDURE — 6360000002 HC RX W HCPCS: Performed by: EMERGENCY MEDICINE

## 2022-10-03 PROCEDURE — 99284 EMERGENCY DEPT VISIT MOD MDM: CPT

## 2022-10-03 PROCEDURE — 96375 TX/PRO/DX INJ NEW DRUG ADDON: CPT

## 2022-10-03 PROCEDURE — 96374 THER/PROPH/DIAG INJ IV PUSH: CPT

## 2022-10-03 PROCEDURE — 84439 ASSAY OF FREE THYROXINE: CPT

## 2022-10-03 PROCEDURE — 82803 BLOOD GASES ANY COMBINATION: CPT

## 2022-10-03 PROCEDURE — 84443 ASSAY THYROID STIM HORMONE: CPT

## 2022-10-03 RX ORDER — PROCHLORPERAZINE EDISYLATE 5 MG/ML
10 INJECTION INTRAMUSCULAR; INTRAVENOUS
Status: COMPLETED | OUTPATIENT
Start: 2022-10-03 | End: 2022-10-03

## 2022-10-03 RX ORDER — DIPHENHYDRAMINE HYDROCHLORIDE 50 MG/ML
25 INJECTION INTRAMUSCULAR; INTRAVENOUS
Status: COMPLETED | OUTPATIENT
Start: 2022-10-03 | End: 2022-10-03

## 2022-10-03 RX ORDER — PROCHLORPERAZINE MALEATE 10 MG
10 TABLET ORAL NIGHTLY
Qty: 20 TABLET | Refills: 0 | Status: SHIPPED | OUTPATIENT
Start: 2022-10-03 | End: 2022-10-24 | Stop reason: SDUPTHER

## 2022-10-03 RX ADMIN — DIPHENHYDRAMINE HYDROCHLORIDE 25 MG: 50 INJECTION, SOLUTION INTRAMUSCULAR; INTRAVENOUS at 05:08

## 2022-10-03 RX ADMIN — PROCHLORPERAZINE EDISYLATE 10 MG: 5 INJECTION INTRAMUSCULAR; INTRAVENOUS at 05:08

## 2022-10-03 ASSESSMENT — PAIN DESCRIPTION - ORIENTATION: ORIENTATION: LOWER

## 2022-10-03 ASSESSMENT — PAIN - FUNCTIONAL ASSESSMENT: PAIN_FUNCTIONAL_ASSESSMENT: 0-10

## 2022-10-03 ASSESSMENT — PAIN DESCRIPTION - DESCRIPTORS: DESCRIPTORS: SHARP

## 2022-10-03 ASSESSMENT — PAIN DESCRIPTION - PAIN TYPE: TYPE: CHRONIC PAIN

## 2022-10-03 ASSESSMENT — PAIN DESCRIPTION - LOCATION: LOCATION: BACK

## 2022-10-03 ASSESSMENT — PAIN SCALES - GENERAL: PAINLEVEL_OUTOF10: 7

## 2022-10-03 NOTE — ED TRIAGE NOTES
EMS states pt was outside in parking lot waiting on their arrival / wife at home asleep/ pt states he was seen earlier for same symptoms/ pt states he can not sleep and took meds as prescribed

## 2022-10-03 NOTE — ED PROVIDER NOTES
Followed by [facility], Dr. Sb Nina, Nitroglycerin PRN-last used 2021    Chronic obstructive pulmonary disease ([facility] [address]     Patient does not have any inhalers at this time, and would like to switch pulmonologist      Chronic pain     Depression     GERD (gastroesophageal reflux disease)     managed with medication     H/O echocardiogram 2020    EF 60-65%    Hypercholesterolemia     managed with medication     Hypertension     managed with medication     Hypothyroidism     managed with medication     MI (myocardial infarction) ([facility] [address]     2 stents placed at that time, daily 81 mg ASA, followed by Dr. Sb Nina     Nausea & vomiting     JORGE on CPAP     Thyroid disease         Past Surgical History:   Procedure Laterality Date    CARDIAC CATHETERIZATION  2021    GI  2021    [facility]    PTCA  2008    stents x 2    TOTAL HIP ARTHROPLASTY Left 2021    total        Family History   Problem Relation Age of Onset    Heart Disease Father     Prostate Cancer Father     Cancer Mother         Social History     Socioeconomic History    Marital status:      Spouse name: None    Number of children: None    Years of education: None    Highest education level: None   Tobacco Use    Smoking status: Former     Packs/day: 1.50     Types: Cigarettes     Quit date: 2008     Years since quittin.7    Smokeless tobacco: Never   Vaping Use    Vaping Use: Never used   Substance and Sexual Activity    Alcohol use: Never    Drug use: Never   Social History Narrative     and lives with wife. Has one dog. Has lived in [place] and [place]. Works as a . Patient has no known allergies.      Previous Medications    ACETAMINOPHEN (TYLENOL) 500 MG TABLET    Take 1,000 mg by mouth every 6 hours as needed    ALBUTEROL (PROVENTIL) (2.5 MG/3ML) 0.083% NEBULIZER SOLUTION    Inhale 2.5 mg into the lungs every 4 hours as needed ALBUTEROL SULFATE  (90 BASE) MCG/ACT INHALER    Inhale 2 puffs into the lungs every 4 hours as needed    ALPRAZOLAM (XANAX) 1 MG TABLET    Take 1-2 tablets by mouth daily as needed for Anxiety. ASPIRIN 81 MG EC TABLET    Take 81 mg by mouth 2 times daily    ATORVASTATIN (LIPITOR) 40 MG TABLET    Take 1 tablet by mouth daily    BUPROPION (WELLBUTRIN XL) 300 MG EXTENDED RELEASE TABLET    Take 1 tablet by mouth every morning    CLOTRIMAZOLE-BETAMETHASONE (LOTRISONE) 1-0.05 % CREAM    Apply topically 2 times daily. FAMOTIDINE (PEPCID) 40 MG TABLET    Take 1 tablet by mouth 2 times daily as needed (heartburn)    FLUOXETINE (PROZAC) 20 MG CAPSULE    Take 1 capsule by mouth daily    FUROSEMIDE (LASIX) 20 MG TABLET    Take 1 tablet by mouth in the morning and 1 tablet before bedtime. Take one tablet as needed if your weight jumps up 2lbs or more or if swelling is severe. [place] HYDROCHLOROTHIAZIDE (MICROZIDE) 12.5 MG CAPSULE    Take 1 capsule by mouth every morning    LEVOTHYROXINE (SYNTHROID) 75 MCG TABLET    Take 1 tablet by mouth in the morning.     LISINOPRIL-HYDROCHLOROTHIAZIDE (PRINZIDE;ZESTORETIC) 10-12.5 MG PER TABLET        MEMANTINE (NAMENDA) 10 MG TABLET    Take half bid for one month than one bid    METHADONE (DOLOPHINE) 5 MG TABLET    TAKE 1 TABLET BY MOUTH TWICE A DAY MUST LAST 30 DAYS    NAPROXEN SODIUM (ANAPROX) 220 MG TABLET    Take 220 mg by mouth 2 times daily (with meals)    NITROGLYCERIN (NITROSTAT) 0.4 MG SL TABLET    Place 0.4 mg under the tongue as needed    ONDANSETRON (ZOFRAN-ODT) 8 MG TBDP DISINTEGRATING TABLET    Take 1 tablet by mouth every 8 hours as needed for Nausea    OXYGEN    2 lpm cont    PANTOPRAZOLE (PROTONIX) 40 MG TABLET    Take 1 tablet by mouth 2 times daily (before meals)    RESTASIS 0.05 % OPHTHALMIC EMULSION    INSTILL 1 DROP IN BOTH EYES TWICE DAILY    SILDENAFIL (VIAGRA) 50 MG TABLET    Take 1-2 tablets by mouth daily as needed for Erectile Dysfunction    TAMSULOSIN (FLOMAX) 0.4 MG CAPSULE    Take 1 capsule by mouth daily    TESTOSTERONE CYPIONATE (DEPOTESTOTERONE CYPIONATE) 200 MG/ML INJECTION    Inject 1 mL into the muscle once a week. TRIAMTERENE-HYDROCHLOROTHIAZIDE (MAXZIDE-25) 37.5-25 MG PER TABLET    TAKE 1 TABLET BY MOUTH DAILY    ZOLPIDEM (AMBIEN) 10 MG TABLET    Take 1 tablet by mouth every evening. Vitals signs and nursing note reviewed. Patient Vitals for the past 4 hrs:   Temp Pulse Resp BP SpO2   10/03/22 0428 97.8 °F (36.6 °C) 95 15 128/65 99 %          Physical Exam  Vitals and nursing note reviewed. Constitutional:       General: He is not in acute distress. Appearance: Normal appearance. He is not ill-appearing, toxic-appearing or diaphoretic. HENT:      Head: Normocephalic and atraumatic. Mouth/Throat:      Mouth: Mucous membranes are moist.      Pharynx: Oropharynx is clear. Eyes:      Extraocular Movements: Extraocular movements intact. Conjunctiva/sclera: Conjunctivae normal.      Pupils: Pupils are equal, round, and reactive to light. Cardiovascular:      Rate and Rhythm: Normal rate and regular rhythm. Pulmonary:      Effort: Pulmonary effort is normal.      Breath sounds: Normal breath sounds. Abdominal:      Palpations: Abdomen is soft. Musculoskeletal:      Cervical back: Normal range of motion and neck supple. Skin:     General: Skin is warm and dry. Capillary Refill: Capillary refill takes less than 2 seconds. Neurological:      General: No focal deficit present. Mental Status: He is alert and oriented to person, place, and time. Mental status is at baseline. Psychiatric:         Mood and Affect: Mood normal.         Behavior: Behavior normal.         Thought Content: Thought content normal.        Procedures    No results found for any visits on 10/03/22. No orders to display                       Voice dictation software was used during the making of this note.   This software is not perfect and grammatical and other typographical errors may be present. This note has not been completely proofread for errors.      Andres Camp DO  10/03/22 1246

## 2022-10-05 ENCOUNTER — HOSPITAL ENCOUNTER (OUTPATIENT)
Dept: SLEEP MEDICINE | Age: 57
Discharge: HOME | End: 2022-10-08
Payer: COMMERCIAL

## 2022-10-05 PROCEDURE — 95811 POLYSOM 6/>YRS CPAP 4/> PARM: CPT

## 2022-10-06 ENCOUNTER — TELEPHONE (OUTPATIENT)
Dept: PULMONOLOGY | Age: 57
End: 2022-10-06

## 2022-10-06 ENCOUNTER — OFFICE VISIT (OUTPATIENT)
Dept: ORTHOPEDIC SURGERY | Age: 57
End: 2022-10-06
Payer: COMMERCIAL

## 2022-10-06 ENCOUNTER — ANESTHESIA EVENT (OUTPATIENT)
Dept: SURGERY | Age: 57
DRG: 714 | End: 2022-10-06

## 2022-10-06 DIAGNOSIS — M25.551 BILATERAL HIP PAIN: ICD-10-CM

## 2022-10-06 DIAGNOSIS — M25.552 LEFT HIP PAIN: Primary | ICD-10-CM

## 2022-10-06 DIAGNOSIS — M25.552 BILATERAL HIP PAIN: ICD-10-CM

## 2022-10-06 PROCEDURE — 99214 OFFICE O/P EST MOD 30 MIN: CPT | Performed by: ORTHOPAEDIC SURGERY

## 2022-10-06 PROCEDURE — G8428 CUR MEDS NOT DOCUMENT: HCPCS | Performed by: ORTHOPAEDIC SURGERY

## 2022-10-06 PROCEDURE — G8484 FLU IMMUNIZE NO ADMIN: HCPCS | Performed by: ORTHOPAEDIC SURGERY

## 2022-10-06 PROCEDURE — 1036F TOBACCO NON-USER: CPT | Performed by: ORTHOPAEDIC SURGERY

## 2022-10-06 PROCEDURE — 3017F COLORECTAL CA SCREEN DOC REV: CPT | Performed by: ORTHOPAEDIC SURGERY

## 2022-10-06 PROCEDURE — G8417 CALC BMI ABV UP PARAM F/U: HCPCS | Performed by: ORTHOPAEDIC SURGERY

## 2022-10-06 RX ORDER — CYCLOBENZAPRINE HCL 10 MG
10 TABLET ORAL 3 TIMES DAILY PRN
COMMUNITY
End: 2022-10-24 | Stop reason: SDUPTHER

## 2022-10-06 RX ORDER — ASPIRIN 81 MG/1
81 TABLET ORAL DAILY
Status: ON HOLD | COMMUNITY
End: 2022-10-09 | Stop reason: HOSPADM

## 2022-10-06 RX ORDER — METOPROLOL SUCCINATE 25 MG/1
25 TABLET, EXTENDED RELEASE ORAL DAILY
COMMUNITY
End: 2022-10-24 | Stop reason: SDUPTHER

## 2022-10-06 RX ORDER — TADALAFIL 20 MG/1
20 TABLET ORAL EVERY OTHER DAY
COMMUNITY

## 2022-10-06 ASSESSMENT — COPD QUESTIONNAIRES: CAT_SEVERITY: MILD

## 2022-10-06 NOTE — PROGRESS NOTES
Patient ID:    Anthony Kessler  [id number]  3 y.o.  1965    Today: October 6, 2022          Chief Complaint: Right hip pain        Patient reports longstanding history of right hip pain. The pain is predominately localized to the anterior groin and is described as a  general ache with occasional sharp pain. They rate the pain ranging from 3-8 on the pain scale occurring in a cyclical fashion with periods of acute exacerbation. Symptoms are exacerbated with getting into and out of their vehicle, crossing their legs, and attempting to put on socks/shoes. No significant pain noted with laying on the affected hip. The patient's hip pain cause moderate to severe limitations in the activities of rising from bed, putting on socks/shoes, rising from a sitting position, bending towards the floor and kneeling, twisting and pivoting on the limb and squatting. In addition, at times the patient reports extreme difficulty with these activities. No numbness of tingling going down the extremity. Patient has attempted prior conservative treatment including medications, activity modification, strengthening exercise, weight loss (if applicable), +/-hip injections consisting or either trochanteric bursitis injections or intra-articular injections.         Past Medical History:  Past Medical History:   Diagnosis Date    Aneurysm ([facility] [address]     Anxiety     Arrhythmia     Arthritis     Back pain     CAD (coronary artery disease)     Followed by [facility], Dr. Shlomo De Anda, Nitroglycerin PRN-last used 5/2021    Chronic obstructive pulmonary disease ([facility] [address]     Patient does not have any inhalers at this time, and would like to switch pulmonologist      Chronic pain     Depression     GERD (gastroesophageal reflux disease)     managed with medication     H/O echocardiogram 04/28/2020    EF 60-65%    Hypercholesterolemia     managed with medication     Hypertension     managed with medication     Hypothyroidism     managed with medication     MI (myocardial infarction) ([facility] [address] 2008    2 stents placed at that time, daily 81 mg ASA, followed by Dr. Oma Salguero     Nausea & vomiting     JORGE on CPAP     Thyroid disease        Past Surgical History:  Past Surgical History:   Procedure Laterality Date    CARDIAC CATHETERIZATION  05/12/2021    GI  05/2021    EGD     LUMBAR FUSION      ORTHOPEDIC SURGERY  1984    elbow infusion    PTCA  2008    stents x 2    TOTAL HIP ARTHROPLASTY Left 09/03/2021    total        Medications:     Prior to Admission medications    Medication Sig Start Date End Date Taking? Authorizing Provider   prochlorperazine (COMPAZINE) 10 MG tablet Take 1 tablet by mouth at bedtime 10/3/22   Toshia Garland DO   FLUoxetine (PROZAC) 20 MG capsule Take 1 capsule by mouth daily 9/27/22   Armando Johnson MD   lisinopril-hydroCHLOROthiazide (PRINZIDE;ZESTORETIC) 10-12.5 MG per tablet  9/20/22   Historical Provider, MD   ALPRAZolam Norrine May) 1 MG tablet Take 1-2 tablets by mouth daily as needed for Anxiety. 9/21/22 9/21/23  Ting Rebolledo MD   ondansetron (ZOFRAN-ODT) 8 MG TBDP disintegrating tablet Take 1 tablet by mouth every 8 hours as needed for Nausea  Patient not taking: Reported on 10/3/2022 9/15/22   Ting Rebolledo MD   triamterene-hydroCHLOROthiazide (MAXZIDE-25) 37.5-25 MG per tablet TAKE 1 TABLET BY MOUTH DAILY 9/2/22   Historical Provider, MD   memantine (NAMENDA) 10 MG tablet Take half bid for one month than one bid  Patient not taking: Reported on 10/3/2022 8/23/22   Michelle Moreno DO   RESTASIS 0.05 % ophthalmic emulsion INSTILL 1 DROP IN BOTH EYES TWICE DAILY 7/12/22   Historical Provider, MD   testosterone cypionate (DEPOTESTOTERONE CYPIONATE) 200 MG/ML injection Inject 1 mL into the muscle once a week.  7/22/22 7/22/23  Ting Rebolledo MD   levothyroxine (SYNTHROID) 75 MCG tablet Take 1 tablet by mouth in the morning. 7/22/22   Ting Rebolledo MD   furosemide (LASIX) 20 MG tablet Take 1 tablet by mouth in the morning and 1 tablet before bedtime. Take one tablet as needed if your weight jumps up 2lbs or more or if swelling is severe. . 7/21/22   Trupti Lee MD   atorvastatin (LIPITOR) 40 MG tablet Take 1 tablet by mouth daily 7/7/22   Shon Villareal MD   hydroCHLOROthiazide (MICROZIDE) 12.5 MG capsule Take 1 capsule by mouth every morning  Patient taking differently: Take 12.5 mg by mouth every morning 1/2 po daily 7/7/22   Shon Villareal MD   buPROPion (WELLBUTRIN XL) 300 MG extended release tablet Take 1 tablet by mouth every morning  Patient not taking: Reported on 10/3/2022 7/7/22   Shon Villareal MD   pantoprazole (PROTONIX) 40 MG tablet Take 1 tablet by mouth 2 times daily (before meals) 7/7/22   Shon Villareal MD   famotidine (PEPCID) 40 MG tablet Take 1 tablet by mouth 2 times daily as needed (heartburn) 7/7/22   Shon Villareal MD   tamsulosin [facility]) 0.4 MG capsule Take 1 capsule by mouth daily 7/7/22   Shon Villareal MD   zolpidem (AMBIEN) 10 MG tablet Take 1 tablet by mouth every evening. 7/7/22 7/8/23  Shon Villareal MD   clotrimazole-betamethasone (LOTRISONE) 1-0.05 % cream Apply topically 2 times daily.  6/16/22   Shon Villareal MD   sildenafil (VIAGRA) 50 MG tablet Take 1-2 tablets by mouth daily as needed for Erectile Dysfunction  Patient not taking: Reported on 10/3/2022 6/13/22   Shon Villareal MD   OXYGEN 2 lpm cont    Ar Automatic Reconciliation   acetaminophen (TYLENOL) 500 MG tablet Take 1,000 mg by mouth every 6 hours as needed  Patient not taking: Reported on 10/3/2022 9/3/21   Ar Automatic Reconciliation   albuterol sulfate  (90 Base) MCG/ACT inhaler Inhale 2 puffs into the lungs every 4 hours as needed 1/23/22   Ar Automatic Reconciliation   albuterol (PROVENTIL) (2.5 MG/3ML) 0.083% nebulizer solution Inhale 2.5 mg into the lungs every 4 hours as needed  Patient not taking: Reported on 10/3/2022 1/28/22   Ar Automatic Reconciliation   aspirin 81 MG EC tablet Take 81 mg by mouth 2 times daily  Patient not taking: Reported on 10/3/2022 9/3/21   Ar Automatic Reconciliation   methadone (DOLOPHINE) 5 MG tablet TAKE 1 TABLET BY MOUTH TWICE A DAY MUST LAST 30 DAYS 20   Ar Automatic Reconciliation   naproxen sodium (ANAPROX) 220 MG tablet Take 220 mg by mouth 2 times daily (with meals)  Patient not taking: Reported on 10/3/2022    Ar Automatic Reconciliation   nitroGLYCERIN (NITROSTAT) 0.4 MG SL tablet Place 0.4 mg under the tongue as needed    Ar Automatic Reconciliation       Family History:     Family History   Problem Relation Age of Onset    Heart Disease Father     Prostate Cancer Father     Cancer Mother        Social History:      Social History     Tobacco Use    Smoking status: Former     Packs/day: 1.50     Types: Cigarettes     Quit date: 2008     Years since quittin.7    Smokeless tobacco: Never   Substance Use Topics    Alcohol use: Never         Allergies:    No Known Allergies       ROS:  Review of systems has been performed and reviewed. Pertinent positives and negatives pertaining to orthopaedic issues has been noted. Non-orthopaedic issues are deferred to PCP      Objective:     Vitals: There were no vitals taken for this visit. General: Awake and alert. AAOx3. The patient ambulates with an antalgic gait. Psych: Mood appropriate  HEENT: Normocephalic, atraumatic  Neck: Supple without obvious mass  CV/Pulm: Breathing even and unlabored  Abdomen: Nondistended on gross examination  Circulation: Normal without obvious arterial or venous deficiency. Pulses palpable bilateral lower extremities. Lymphatic: No obvious lymphedema or lymphadenopathy noted. Skin: No obvious lacerations, lesions or rash noted. Musculoskeletal: No obvious deformity or pain with active movement of the upper extremities.   Neuro: No obvious deficiency or weakness noted of the upper or lower extremities    Hip Exam:   Exam of the hip reveals anterior groin pain with resisted leg raise and FADIR testing. Internal and external rotation is decreased in the hip. No evidence of arterial of venous insufficiency. DP/PT pulses appreciable. Able to plantar- and dorsi-flex the foot/ankle. Imaging:    Images obtained today or prior    XR HIP TAYLER W OR WO PELVIS 2-3 VWS  Views Obtained: AP pelvis, lateral bilateral hip  Indication: Bilateral Hip Pain  Findings:  Xrays including A/P Pelvis and lateral of the hip(s) were reviewed which demonstrate severe joint space narrowing of the right hip. There is osteophyte formation around the acetabulum and femoral head. Subchondral sclerosis noted. No obvious fracture appreciated. There is no acute bony abnormality such as malignancy appreciated. Left NINO in place. No evidence of hardware failure. Impression: Osteoarthritis of the right hip    Duarte Moses MD    Assessment:   Hip Arthritis    Plan:  Differential diagnosis and treatment options have been discussed with the patient. Risks, benefits and alternatives of each were discussed and patient questions answered. At this point the patient would like to proceed with total hip replacement    Treatment:    Total Hip Replacement- The patient has failed previous conservative treatment for this condition. The patient has pain in the hip which causes daily symptoms which affects the patient's activities of daily living and quality of life.  The risks, benefits, alternatives and possible complications of right total hip arthroplasty have been discussed with the patient including but not limited to infection, bleeding, damage to nerves and blood vessels with particular attention given to risk of damage of the femoral, obturator, lateral femoral cutaneous, superior gluteal, inferior gluteal, and sciatic nerve, risk of dislocation, fracture both intra-op and post-op, limb length inequality, polyethylene wear, implant loosening, risk for continued pain, and risk for revision surgery secondary to but not limited to all of these discussed risks. Further we discussed risk of venous thrombo-embolism including deep vein thrombosis and pulmonary embolism despite being on prophylaxis. We have also previously discussed the potential of morbidity and mortality associated with, but not limited to, the actual surgical procedure, anesthesia, prior medical conditions, and/or the administration of medications perioperatively. I have made no guarantees to the patient regarding outcomes and the patient has voiced understanding of that. The patient has been given the opportunity to ask questions all of which have been answered and the patient wishes to proceed.         Signed By: Angel Castillo MD  October 6, 2022

## 2022-10-06 NOTE — PERIOP NOTE
Patient verified name and . Order for consent not found in EHR and matches case posting; patient verifies procedure. Type II surgery, phone assessment complete. Orders not received. Labs per surgeon: none  Labs per anesthesia protocol: CBC, BMP, Type & Screen DOS   Lab results (CMP,  CBC) in EHR from 10/2/22 and Acceptable per anesthesia protocol     Cardiac clearance for surgery from [facility], Dr. Darby Nunes 10/4/22 in EHR under media    Patient answered medical/surgical history questions at their best of ability. All prior to admission medications documented in [facility]. Patient instructed to take the following medications the day of surgery according to anesthesia guidelines with a small sip of water: Metoprolol succinate (Toprol XL), Aspirin, Methadone (Dolophine), Alprazolam (Xanax) if needed, Nitroglycerin (Nitrostat) use if needed and bring, Albuterol sulfate HFA  use and bring, Fluoxetine (Prozac),  Zofran (ondansetron) if needed,  Atorvastatin (Lipitor), Tamsulosin (Flomax), Memantine (Namenda), Levothyroxine (Synthroid), Pantoprazole (Protonix), On the day before surgery please take Acetaminophen 1000mg in the morning and then again before bed. You may substitute for Tylenol 650 mg. Hold all vitamins 7 days prior to surgery and NSAIDS 5 days prior to surgery.  Prescription meds to hold:Lisinopril-HCTZ (Prinzide, Zestoretic), Furosemide (Lasix), Cyclobenzaprine (Flexeril)   Patient instructed on the following:    > Arrive at [id number] [facility], time of arrival to be called the day before by 1700  > NPO after midnight, unless otherwise indicated, including gum, mints, and ice chips  > Responsible adult must drive patient to the hospital, stay during surgery, and patient will need supervision 24 hours after anesthesia  > Use antibacterial soap  in shower the night before surgery and on the morning of surgery  > All piercings must be removed prior to arrival.    > Leave all valuables (money and jewelry) at home but bring insurance card and ID on DOS.   > You may be required to pay a deductible or co-pay on the day of your procedure. You can pre-pay by calling [phone] if your surgery is at the [facility] or [phone] if your surgery is at the [facility]. > Do not wear make-up, nail polish, lotions, cologne, perfumes, powders, or oil on skin. Artificial nails are not permitted.

## 2022-10-06 NOTE — ANESTHESIA PRE PROCEDURE
Department of Anesthesiology  Preprocedure Note       Name:  Yumi Hall   Age:  59 y.o.  :  1965                                          MRN:  [id number]         Date:  10/6/2022      Surgeon: Valerio Wall):  Jenise Cullen MD    Procedure: Procedure(s):  CYSTOSCOPY TRANSURETHRAL RESECTION PROSTATE/CHOICE    Medications prior to admission:   Prior to Admission medications    Medication Sig Start Date End Date Taking? Authorizing Provider   tadalafil (CIALIS) 20 MG tablet Take 20 mg by mouth every other day   Yes Historical Provider, MD   metoprolol succinate (TOPROL XL) 25 MG extended release tablet Take 25 mg by mouth daily   Yes Historical Provider, MD   aspirin 81 MG EC tablet Take 81 mg by mouth daily   Yes Historical Provider, MD   cyclobenzaprine (FLEXERIL) 10 MG tablet Take 10 mg by mouth 3 times daily as needed for Muscle spasms   Yes Historical Provider, MD   prochlorperazine (COMPAZINE) 10 MG tablet Take 1 tablet by mouth at bedtime  Patient taking differently: Take 10 mg by mouth every 6 hours as needed 10/3/22   Annemarie Godoy DO   FLUoxetine (PROZAC) 20 MG capsule Take 1 capsule by mouth daily 22   Gal Rose MD   lisinopril-hydroCHLOROthiazide (PRINZIDE;ZESTORETIC) 10-12.5 MG per tablet  22   Historical Provider, MD   ALPRAZolam Linus Isaías) 1 MG tablet Take 1-2 tablets by mouth daily as needed for Anxiety.  22  Myke Amador MD   ondansetron (ZOFRAN-ODT) 8 MG TBDP disintegrating tablet Take 1 tablet by mouth every 8 hours as needed for Nausea  Patient not taking: No sig reported 9/15/22   Myke Amador MD   triamterene-hydroCHLOROthiazide (MAXZIDE-25) 37.5-25 MG per tablet TAKE 1 TABLET BY MOUTH DAILY 22   Historical Provider, MD   memantine (NAMENDA) 10 MG tablet Take half bid for one month than one bid  Patient not taking: No sig reported 22   Christin Deal DO   RESTASIS 0.05 % ophthalmic emulsion INSTILL 1 DROP IN BOTH EYES TWICE DAILY 22 Historical Provider, MD   testosterone cypionate (DEPOTESTOTERONE CYPIONATE) 200 MG/ML injection Inject 1 mL into the muscle once a week. 7/22/22 7/22/23  Tiffany Cralwey MD   levothyroxine (SYNTHROID) 75 MCG tablet Take 1 tablet by mouth in the morning. 7/22/22   Tiffany Crawley MD   furosemide (LASIX) 20 MG tablet Take 1 tablet by mouth in the morning and 1 tablet before bedtime. Take one tablet as needed if your weight jumps up 2lbs or more or if swelling is severe. . 7/21/22   Eladio Guzmán MD   atorvastatin (LIPITOR) 40 MG tablet Take 1 tablet by mouth daily 7/7/22   Tiffany Crawley MD   hydroCHLOROthiazide (MICROZIDE) 12.5 MG capsule Take 1 capsule by mouth every morning  Patient taking differently: Take 12.5 mg by mouth every morning 1/2 po daily 7/7/22   Tiffany Crawley MD   pantoprazole (PROTONIX) 40 MG tablet Take 1 tablet by mouth 2 times daily (before meals) 7/7/22   Tiffany Crawley MD   famotidine (PEPCID) 40 MG tablet Take 1 tablet by mouth 2 times daily as needed (heartburn) 7/7/22   Tifafny Crawley MD   tamsulosin [facility]) 0.4 MG capsule Take 1 capsule by mouth daily  Patient taking differently: Take 0.4 mg by mouth in the morning and at bedtime 7/7/22   Tiffany Crawley MD   zolpidem (AMBIEN) 10 MG tablet Take 1 tablet by mouth every evening.  7/7/22 7/8/23  Tiffany Crawley MD   OXYGEN 2 lpm cont    Ar Automatic Reconciliation   acetaminophen (TYLENOL) 500 MG tablet Take 1,000 mg by mouth every 6 hours as needed  Patient not taking: No sig reported 9/3/21   Ar Automatic Reconciliation   albuterol sulfate  (90 Base) MCG/ACT inhaler Inhale 2 puffs into the lungs every 4 hours as needed 1/23/22   Ar Automatic Reconciliation   albuterol (PROVENTIL) (2.5 MG/3ML) 0.083% nebulizer solution Inhale 2.5 mg into the lungs every 4 hours as needed  Patient not taking: No sig reported 1/28/22   Ar Automatic Reconciliation   methadone (DOLOPHINE) 5 MG tablet TAKE 1 TABLET BY MOUTH TWICE A DAY MUST LAST 30 DAYS 11/30/20   Ar Automatic Reconciliation   naproxen sodium (ANAPROX) 220 MG tablet Take 220 mg by mouth 2 times daily (with meals)  Patient not taking: No sig reported    Ar Automatic Reconciliation   nitroGLYCERIN (NITROSTAT) 0.4 MG SL tablet Place 0.4 mg under the tongue as needed    Ar Automatic Reconciliation       Current medications:    No current facility-administered medications for this encounter. Current Outpatient Medications   Medication Sig Dispense Refill    tadalafil (CIALIS) 20 MG tablet Take 20 mg by mouth every other day      metoprolol succinate (TOPROL XL) 25 MG extended release tablet Take 25 mg by mouth daily      aspirin 81 MG EC tablet Take 81 mg by mouth daily      cyclobenzaprine (FLEXERIL) 10 MG tablet Take 10 mg by mouth 3 times daily as needed for Muscle spasms      prochlorperazine (COMPAZINE) 10 MG tablet Take 1 tablet by mouth at bedtime (Patient taking differently: Take 10 mg by mouth every 6 hours as needed) 20 tablet 0    FLUoxetine (PROZAC) 20 MG capsule Take 1 capsule by mouth daily 30 capsule 2    lisinopril-hydroCHLOROthiazide (PRINZIDE;ZESTORETIC) 10-12.5 MG per tablet       ALPRAZolam (XANAX) 1 MG tablet Take 1-2 tablets by mouth daily as needed for Anxiety. 30 tablet 5    ondansetron (ZOFRAN-ODT) 8 MG TBDP disintegrating tablet Take 1 tablet by mouth every 8 hours as needed for Nausea (Patient not taking: No sig reported) 90 tablet 2    triamterene-hydroCHLOROthiazide (MAXZIDE-25) 37.5-25 MG per tablet TAKE 1 TABLET BY MOUTH DAILY      memantine (NAMENDA) 10 MG tablet Take half bid for one month than one bid (Patient not taking: No sig reported) 60 tablet 11    RESTASIS 0.05 % ophthalmic emulsion INSTILL 1 DROP IN BOTH EYES TWICE DAILY      testosterone cypionate (DEPOTESTOTERONE CYPIONATE) 200 MG/ML injection Inject 1 mL into the muscle once a week. 5 mL 5    levothyroxine (SYNTHROID) 75 MCG tablet Take 1 tablet by mouth in the morning.  80 tablet 1    furosemide (LASIX) 20 MG tablet Take 1 tablet by mouth in the morning and 1 tablet before bedtime. Take one tablet as needed if your weight jumps up 2lbs or more or if swelling is severe. . 30 tablet 11    atorvastatin (LIPITOR) 40 MG tablet Take 1 tablet by mouth daily 90 tablet 1    hydroCHLOROthiazide (MICROZIDE) 12.5 MG capsule Take 1 capsule by mouth every morning (Patient taking differently: Take 12.5 mg by mouth every morning 1/2 po daily) 90 capsule 1    pantoprazole (PROTONIX) 40 MG tablet Take 1 tablet by mouth 2 times daily (before meals) 180 tablet 1    famotidine (PEPCID) 40 MG tablet Take 1 tablet by mouth 2 times daily as needed (heartburn) 180 tablet 1    tamsulosin (FLOMAX) 0.4 MG capsule Take 1 capsule by mouth daily (Patient taking differently: Take 0.4 mg by mouth in the morning and at bedtime) 90 capsule 1    zolpidem (AMBIEN) 10 MG tablet Take 1 tablet by mouth every evening.  30 tablet 5    OXYGEN 2 lpm cont      acetaminophen (TYLENOL) 500 MG tablet Take 1,000 mg by mouth every 6 hours as needed (Patient not taking: No sig reported)      albuterol sulfate  (90 Base) MCG/ACT inhaler Inhale 2 puffs into the lungs every 4 hours as needed      albuterol (PROVENTIL) (2.5 MG/3ML) 0.083% nebulizer solution Inhale 2.5 mg into the lungs every 4 hours as needed (Patient not taking: No sig reported)      methadone (DOLOPHINE) 5 MG tablet TAKE 1 TABLET BY MOUTH TWICE A DAY MUST LAST 30 DAYS      naproxen sodium (ANAPROX) 220 MG tablet Take 220 mg by mouth 2 times daily (with meals) (Patient not taking: No sig reported)      nitroGLYCERIN (NITROSTAT) 0.4 MG SL tablet Place 0.4 mg under the tongue as needed         Allergies:  No Known Allergies    Problem List:    Patient Active Problem List   Diagnosis Code   Manan 75 maintenance Z00.00    CAD in native artery I25.10    HTN (hypertension) I10    Chronic back pain M54.9, G89.29    COPD (chronic obstructive pulmonary disease) ([facility] J44.9    Hypothyroidism (acquired) E03.9    GERD (gastroesophageal reflux disease) K21.9    Panic anxiety syndrome F41.0    History of tobacco use Z87.891    Annual physical exam Z00.00    Insomnia G47.00    Chronic prescription benzodiazepine use Z79.899    OA (osteoarthritis) M19.90    S/P lumbar spine operation Z98.890    Allergic conjunctivitis H10.10    Palpitations R00.2    Hypotestosteronemia E34.9    ED (erectile dysfunction) N52.9    JORGE (obstructive sleep apnea) G47.33    IGT (impaired glucose tolerance) R73.02    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 Aggie Fisher, Nitroglycerin PRN-last used 5/2021    Chronic obstructive pulmonary disease ([facility]     Patient does not have any inhalers at this time, and would like to switch pulmonologist      Chronic pain     COVID-19 01/16/2022    not hospitalized, covid pneumonia w/ hypoxic Resp failure , dyspnea, onset dementia, panic attacks    Depression     GERD (gastroesophageal reflux disease)     managed with medication     H/O echocardiogram 04/28/2020    EF 60-65%    Hypercholesterolemia     managed with medication     Hypertension     managed with medication     Hypothyroidism     managed with medication     MI (myocardial infarction) ([facility] 2008    2 stents placed at that time, daily 81 mg ASA, followed by Dr. Kenton Jimenez Nausea & vomiting     JORGE on CPAP     Thyroid disease        Past Surgical History:        Procedure Laterality Date    CARDIAC CATHETERIZATION  05/12/2021    GI  05/2021    EGD     LUMBAR FUSION      ORTHOPEDIC SURGERY  1984    elbow infusion    PTCA  2008    stents x 2    TOTAL HIP ARTHROPLASTY Left 2021    total       Social History:    Social History     Tobacco Use    Smoking status: Former     Packs/day: 1.50     Types: Cigarettes     Quit date: 2008     Years since quittin.7    Smokeless tobacco: Never   Substance Use Topics    Alcohol use: Never                                Counseling given: Not Answered      Vital Signs (Current):   Vitals:    10/06/22 1132   Weight: 255 lb (115.7 kg)   Height: 6' (1.829 m)                                              BP Readings from Last 3 Encounters:   10/03/22 128/65   10/02/22 121/70   22 121/80       NPO Status:                                                                                 BMI:   Wt Readings from Last 3 Encounters:   10/06/22 255 lb (115.7 kg)   10/03/22 255 lb (115.7 kg)   10/02/22 250 lb (113.4 kg)     Body mass index is 34.58 kg/m². CBC:   Lab Results   Component Value Date/Time    WBC 9.8 10/02/2022 03:14 PM    RBC 5.56 10/02/2022 03:14 PM    HGB 15.1 10/02/2022 03:14 PM    HCT 48.0 10/02/2022 03:14 PM    MCV 86.3 10/02/2022 03:14 PM    RDW 14.2 10/02/2022 03:14 PM     10/02/2022 03:14 PM       CMP:   Lab Results   Component Value Date/Time     10/02/2022 03:14 PM    K 3.6 10/02/2022 03:14 PM     10/02/2022 03:14 PM    CO2 31 10/02/2022 03:14 PM    BUN 12 10/02/2022 03:14 PM    CREATININE 1.17 10/02/2022 03:14 PM    GFRAA >60 10/02/2022 03:14 PM    AGRATIO 0.9 2022 09:22 AM    LABGLOM >60 10/02/2022 03:14 PM    GLUCOSE 122 10/02/2022 03:14 PM    PROT 6.9 10/02/2022 03:14 PM    CALCIUM 8.6 10/02/2022 03:14 PM    BILITOT 0.6 10/02/2022 03:14 PM    ALKPHOS 77 10/02/2022 03:14 PM    ALKPHOS 97 2022 09:22 AM    AST 17 10/02/2022 03:14 PM    ALT 26 10/02/2022 03:14 PM       POC Tests: No results for input(s): POCGLU, POCNA, POCK, POCCL, POCBUN, POCHEMO, POCHCT in the last 72 hours.     Coags:   Lab Results   Component Value Date/Time    PROTIME 13.3 08/24/2021 07:55 AM    INR 1.0 08/24/2021 07:55 AM    APTT 25.9 08/24/2021 07:55 AM       HCG (If Applicable): No results found for: PREGTESTUR, PREGSERUM, HCG, HCGQUANT     ABGs: No results found for: PHART, PO2ART, TWU8DVZ, SSZ0FRN, BEART, V4BBIILF     Type & Screen (If Applicable):  No results found for: LABABO, LABRH    Drug/Infectious Status (If Applicable):  No results found for: HIV, HEPCAB    COVID-19 Screening (If Applicable):   Lab Results   Component Value Date/Time    COVID19 Not Detected 08/31/2021 09:12 AM           Anesthesia Evaluation  Patient summary reviewed   history of anesthetic complications: PONV. Airway: Mallampati: II  TM distance: >3 FB   Neck ROM: full  Mouth opening: > = 3 FB   Dental:    (+) upper dentures and lower dentures      Pulmonary:normal exam    (+) COPD: mild,  sleep apnea: on CPAP,                             Cardiovascular:  Exercise tolerance: good (>4 METS),   (+) hypertension: mild, past MI (2008): no interval change, CAD: obstructive and no interval change, CABG/stent (2 OLESYA 2008 ):,                   Neuro/Psych:   Negative Neuro/Psych ROS              GI/Hepatic/Renal:   (+) GERD: well controlled,           Endo/Other:    (+) hypothyroidism: arthritis: OA., .                 Abdominal:             Vascular: negative vascular ROS. Other Findings:           Anesthesia Plan      general     ASA 3       Induction: intravenous. Anesthetic plan and risks discussed with patient and spouse.                         Kelvin Paiz MD   10/6/2022

## 2022-10-07 ENCOUNTER — ANESTHESIA (OUTPATIENT)
Dept: SURGERY | Age: 57
DRG: 714 | End: 2022-10-07

## 2022-10-07 ENCOUNTER — HOSPITAL ENCOUNTER (INPATIENT)
Age: 57
LOS: 1 days | Discharge: HOME | DRG: 714 | End: 2022-10-09
Attending: UROLOGY | Admitting: UROLOGY

## 2022-10-07 DIAGNOSIS — G47.33 OSA (OBSTRUCTIVE SLEEP APNEA): Primary | ICD-10-CM

## 2022-10-07 PROBLEM — Z09 SURGERY FOLLOW-UP: Status: ACTIVE | Noted: 2022-10-07

## 2022-10-07 PROBLEM — Z90.79 S/P TURP: Status: ACTIVE | Noted: 2022-10-07

## 2022-10-07 LAB
ABO + RH BLD: NORMAL
BLOOD GROUP ANTIBODIES SERPL: NORMAL
POTASSIUM BLD-SCNC: 3.7 MMOL/L (ref 3.5–5.1)
SPECIMEN EXP DATE BLD: NORMAL

## 2022-10-07 PROCEDURE — 6360000002 HC RX W HCPCS: Performed by: ANESTHESIOLOGY

## 2022-10-07 PROCEDURE — 7100000001 HC PACU RECOVERY - ADDTL 15 MIN: Performed by: UROLOGY

## 2022-10-07 PROCEDURE — G0378 HOSPITAL OBSERVATION PER HR: HCPCS

## 2022-10-07 PROCEDURE — 6370000000 HC RX 637 (ALT 250 FOR IP): Performed by: UROLOGY

## 2022-10-07 PROCEDURE — 2580000003 HC RX 258: Performed by: UROLOGY

## 2022-10-07 PROCEDURE — 0VB08ZZ EXCISION OF PROSTATE, VIA NATURAL OR ARTIFICIAL OPENING ENDOSCOPIC: ICD-10-PCS | Performed by: UROLOGY

## 2022-10-07 PROCEDURE — 3700000000 HC ANESTHESIA ATTENDED CARE: Performed by: UROLOGY

## 2022-10-07 PROCEDURE — 0T7D8ZZ DILATION OF URETHRA, VIA NATURAL OR ARTIFICIAL OPENING ENDOSCOPIC: ICD-10-PCS | Performed by: UROLOGY

## 2022-10-07 PROCEDURE — 2709999900 HC NON-CHARGEABLE SUPPLY: Performed by: UROLOGY

## 2022-10-07 PROCEDURE — 3600000014 HC SURGERY LEVEL 4 ADDTL 15MIN: Performed by: UROLOGY

## 2022-10-07 PROCEDURE — 2500000003 HC RX 250 WO HCPCS: Performed by: NURSE ANESTHETIST, CERTIFIED REGISTERED

## 2022-10-07 PROCEDURE — 2720000010 HC SURG SUPPLY STERILE: Performed by: UROLOGY

## 2022-10-07 PROCEDURE — 2580000003 HC RX 258: Performed by: ANESTHESIOLOGY

## 2022-10-07 PROCEDURE — 6360000002 HC RX W HCPCS: Performed by: UROLOGY

## 2022-10-07 PROCEDURE — 96375 TX/PRO/DX INJ NEW DRUG ADDON: CPT

## 2022-10-07 PROCEDURE — 84132 ASSAY OF SERUM POTASSIUM: CPT

## 2022-10-07 PROCEDURE — 7100000000 HC PACU RECOVERY - FIRST 15 MIN: Performed by: UROLOGY

## 2022-10-07 PROCEDURE — 6370000000 HC RX 637 (ALT 250 FOR IP): Performed by: ANESTHESIOLOGY

## 2022-10-07 PROCEDURE — 2500000003 HC RX 250 WO HCPCS: Performed by: REGISTERED NURSE

## 2022-10-07 PROCEDURE — 6360000002 HC RX W HCPCS: Performed by: REGISTERED NURSE

## 2022-10-07 PROCEDURE — 88305 TISSUE EXAM BY PATHOLOGIST: CPT

## 2022-10-07 PROCEDURE — 3600000004 HC SURGERY LEVEL 4 BASE: Performed by: UROLOGY

## 2022-10-07 PROCEDURE — 86901 BLOOD TYPING SEROLOGIC RH(D): CPT

## 2022-10-07 PROCEDURE — 3700000001 HC ADD 15 MINUTES (ANESTHESIA): Performed by: UROLOGY

## 2022-10-07 PROCEDURE — 2580000003 HC RX 258: Performed by: REGISTERED NURSE

## 2022-10-07 PROCEDURE — 96374 THER/PROPH/DIAG INJ IV PUSH: CPT

## 2022-10-07 RX ORDER — ONDANSETRON 2 MG/ML
4 INJECTION INTRAMUSCULAR; INTRAVENOUS
Status: DISCONTINUED | OUTPATIENT
Start: 2022-10-07 | End: 2022-10-07 | Stop reason: HOSPADM

## 2022-10-07 RX ORDER — DEXAMETHASONE SODIUM PHOSPHATE 4 MG/ML
INJECTION, SOLUTION INTRA-ARTICULAR; INTRALESIONAL; INTRAMUSCULAR; INTRAVENOUS; SOFT TISSUE PRN
Status: DISCONTINUED | OUTPATIENT
Start: 2022-10-07 | End: 2022-10-07 | Stop reason: SDUPTHER

## 2022-10-07 RX ORDER — POLYETHYLENE GLYCOL 3350 17 G/17G
17 POWDER, FOR SOLUTION ORAL DAILY PRN
Status: DISCONTINUED | OUTPATIENT
Start: 2022-10-07 | End: 2022-10-09 | Stop reason: HOSPADM

## 2022-10-07 RX ORDER — HYDROMORPHONE HYDROCHLORIDE 2 MG/ML
0.5 INJECTION, SOLUTION INTRAMUSCULAR; INTRAVENOUS; SUBCUTANEOUS EVERY 5 MIN PRN
Status: DISCONTINUED | OUTPATIENT
Start: 2022-10-07 | End: 2022-10-07 | Stop reason: HOSPADM

## 2022-10-07 RX ORDER — SODIUM CHLORIDE, SODIUM LACTATE, POTASSIUM CHLORIDE, CALCIUM CHLORIDE 600; 310; 30; 20 MG/100ML; MG/100ML; MG/100ML; MG/100ML
INJECTION, SOLUTION INTRAVENOUS CONTINUOUS
Status: DISCONTINUED | OUTPATIENT
Start: 2022-10-07 | End: 2022-10-08

## 2022-10-07 RX ORDER — ATROPA BELLADONNA AND OPIUM 16.2; 6 MG/1; MG/1
SUPPOSITORY RECTAL PRN
Status: DISCONTINUED | OUTPATIENT
Start: 2022-10-07 | End: 2022-10-07 | Stop reason: HOSPADM

## 2022-10-07 RX ORDER — GENTAMICIN SULFATE 80 MG/100ML
80 INJECTION, SOLUTION INTRAVENOUS ONCE
Status: COMPLETED | OUTPATIENT
Start: 2022-10-07 | End: 2022-10-07

## 2022-10-07 RX ORDER — ONDANSETRON 2 MG/ML
INJECTION INTRAMUSCULAR; INTRAVENOUS PRN
Status: DISCONTINUED | OUTPATIENT
Start: 2022-10-07 | End: 2022-10-07 | Stop reason: SDUPTHER

## 2022-10-07 RX ORDER — ROCURONIUM BROMIDE 10 MG/ML
INJECTION, SOLUTION INTRAVENOUS PRN
Status: DISCONTINUED | OUTPATIENT
Start: 2022-10-07 | End: 2022-10-07 | Stop reason: SDUPTHER

## 2022-10-07 RX ORDER — ONDANSETRON 2 MG/ML
4 INJECTION INTRAMUSCULAR; INTRAVENOUS EVERY 6 HOURS PRN
Status: DISCONTINUED | OUTPATIENT
Start: 2022-10-07 | End: 2022-10-09 | Stop reason: HOSPADM

## 2022-10-07 RX ORDER — SODIUM CHLORIDE 9 MG/ML
INJECTION, SOLUTION INTRAVENOUS PRN
Status: DISCONTINUED | OUTPATIENT
Start: 2022-10-07 | End: 2022-10-07 | Stop reason: HOSPADM

## 2022-10-07 RX ORDER — SODIUM CHLORIDE 9 MG/ML
INJECTION, SOLUTION INTRAVENOUS PRN
Status: DISCONTINUED | OUTPATIENT
Start: 2022-10-07 | End: 2022-10-09 | Stop reason: HOSPADM

## 2022-10-07 RX ORDER — NEOSTIGMINE METHYLSULFATE 1 MG/ML
INJECTION, SOLUTION INTRAVENOUS PRN
Status: DISCONTINUED | OUTPATIENT
Start: 2022-10-07 | End: 2022-10-07 | Stop reason: SDUPTHER

## 2022-10-07 RX ORDER — MIDAZOLAM HYDROCHLORIDE 2 MG/2ML
2 INJECTION, SOLUTION INTRAMUSCULAR; INTRAVENOUS
Status: COMPLETED | OUTPATIENT
Start: 2022-10-07 | End: 2022-10-07

## 2022-10-07 RX ORDER — ONDANSETRON 8 MG/1
4 TABLET, ORALLY DISINTEGRATING ORAL EVERY 8 HOURS PRN
Status: DISCONTINUED | OUTPATIENT
Start: 2022-10-07 | End: 2022-10-09 | Stop reason: HOSPADM

## 2022-10-07 RX ORDER — FENTANYL CITRATE 50 UG/ML
50 INJECTION, SOLUTION INTRAMUSCULAR; INTRAVENOUS PRN
Status: DISCONTINUED | OUTPATIENT
Start: 2022-10-07 | End: 2022-10-07 | Stop reason: HOSPADM

## 2022-10-07 RX ORDER — LIDOCAINE HYDROCHLORIDE 20 MG/ML
INJECTION, SOLUTION EPIDURAL; INFILTRATION; INTRACAUDAL; PERINEURAL PRN
Status: DISCONTINUED | OUTPATIENT
Start: 2022-10-07 | End: 2022-10-07 | Stop reason: SDUPTHER

## 2022-10-07 RX ORDER — METHADONE HYDROCHLORIDE 5 MG/1
5 TABLET ORAL DAILY
Status: DISCONTINUED | OUTPATIENT
Start: 2022-10-08 | End: 2022-10-09 | Stop reason: HOSPADM

## 2022-10-07 RX ORDER — FENTANYL CITRATE 50 UG/ML
100 INJECTION, SOLUTION INTRAMUSCULAR; INTRAVENOUS PRN
Status: DISCONTINUED | OUTPATIENT
Start: 2022-10-07 | End: 2022-10-07 | Stop reason: HOSPADM

## 2022-10-07 RX ORDER — SODIUM CHLORIDE, SODIUM LACTATE, POTASSIUM CHLORIDE, CALCIUM CHLORIDE 600; 310; 30; 20 MG/100ML; MG/100ML; MG/100ML; MG/100ML
100 INJECTION, SOLUTION INTRAVENOUS CONTINUOUS
Status: DISCONTINUED | OUTPATIENT
Start: 2022-10-07 | End: 2022-10-07 | Stop reason: HOSPADM

## 2022-10-07 RX ORDER — SODIUM CHLORIDE 0.9 % (FLUSH) 0.9 %
5-40 SYRINGE (ML) INJECTION PRN
Status: DISCONTINUED | OUTPATIENT
Start: 2022-10-07 | End: 2022-10-09 | Stop reason: HOSPADM

## 2022-10-07 RX ORDER — SODIUM CHLORIDE 0.9 % (FLUSH) 0.9 %
5-40 SYRINGE (ML) INJECTION EVERY 12 HOURS SCHEDULED
Status: DISCONTINUED | OUTPATIENT
Start: 2022-10-07 | End: 2022-10-07 | Stop reason: HOSPADM

## 2022-10-07 RX ORDER — PROPOFOL 10 MG/ML
INJECTION, EMULSION INTRAVENOUS PRN
Status: DISCONTINUED | OUTPATIENT
Start: 2022-10-07 | End: 2022-10-07 | Stop reason: SDUPTHER

## 2022-10-07 RX ORDER — KETOROLAC TROMETHAMINE 30 MG/ML
30 INJECTION, SOLUTION INTRAMUSCULAR; INTRAVENOUS PRN
Status: DISCONTINUED | OUTPATIENT
Start: 2022-10-07 | End: 2022-10-09 | Stop reason: HOSPADM

## 2022-10-07 RX ORDER — SODIUM CHLORIDE 0.9 % (FLUSH) 0.9 %
5-40 SYRINGE (ML) INJECTION PRN
Status: DISCONTINUED | OUTPATIENT
Start: 2022-10-07 | End: 2022-10-07 | Stop reason: HOSPADM

## 2022-10-07 RX ORDER — EPHEDRINE SULFATE/0.9% NACL/PF 50 MG/5 ML
SYRINGE (ML) INTRAVENOUS PRN
Status: DISCONTINUED | OUTPATIENT
Start: 2022-10-07 | End: 2022-10-07 | Stop reason: SDUPTHER

## 2022-10-07 RX ORDER — LIDOCAINE HYDROCHLORIDE 10 MG/ML
1 INJECTION, SOLUTION INFILTRATION; PERINEURAL
Status: DISCONTINUED | OUTPATIENT
Start: 2022-10-07 | End: 2022-10-07 | Stop reason: HOSPADM

## 2022-10-07 RX ORDER — SODIUM CHLORIDE 0.9 % (FLUSH) 0.9 %
5-40 SYRINGE (ML) INJECTION EVERY 12 HOURS SCHEDULED
Status: DISCONTINUED | OUTPATIENT
Start: 2022-10-07 | End: 2022-10-09 | Stop reason: HOSPADM

## 2022-10-07 RX ORDER — SODIUM CHLORIDE 9 MG/ML
INJECTION, SOLUTION INTRAVENOUS CONTINUOUS
Status: DISCONTINUED | OUTPATIENT
Start: 2022-10-07 | End: 2022-10-08

## 2022-10-07 RX ORDER — OXYCODONE HYDROCHLORIDE 5 MG/1
10 TABLET ORAL PRN
Status: COMPLETED | OUTPATIENT
Start: 2022-10-07 | End: 2022-10-07

## 2022-10-07 RX ORDER — ZOLPIDEM TARTRATE 5 MG/1
10 TABLET ORAL NIGHTLY PRN
Status: DISCONTINUED | OUTPATIENT
Start: 2022-10-07 | End: 2022-10-09 | Stop reason: HOSPADM

## 2022-10-07 RX ORDER — ALPRAZOLAM 0.5 MG/1
1 TABLET ORAL 2 TIMES DAILY PRN
Status: DISCONTINUED | OUTPATIENT
Start: 2022-10-07 | End: 2022-10-09 | Stop reason: HOSPADM

## 2022-10-07 RX ORDER — GENTAMICIN SULFATE 80 MG/100ML
80 INJECTION, SOLUTION INTRAVENOUS
Status: COMPLETED | OUTPATIENT
Start: 2022-10-07 | End: 2022-10-07

## 2022-10-07 RX ORDER — OXYCODONE HYDROCHLORIDE 5 MG/1
5 TABLET ORAL PRN
Status: COMPLETED | OUTPATIENT
Start: 2022-10-07 | End: 2022-10-07

## 2022-10-07 RX ORDER — GLYCOPYRROLATE 0.2 MG/ML
INJECTION INTRAMUSCULAR; INTRAVENOUS PRN
Status: DISCONTINUED | OUTPATIENT
Start: 2022-10-07 | End: 2022-10-07 | Stop reason: SDUPTHER

## 2022-10-07 RX ADMIN — KETOROLAC TROMETHAMINE 30 MG: 30 INJECTION, SOLUTION INTRAMUSCULAR; INTRAVENOUS at 21:09

## 2022-10-07 RX ADMIN — ROCURONIUM BROMIDE 10 MG: 50 INJECTION, SOLUTION INTRAVENOUS at 08:34

## 2022-10-07 RX ADMIN — SODIUM CHLORIDE, SODIUM LACTATE, POTASSIUM CHLORIDE, AND CALCIUM CHLORIDE: 600; 310; 30; 20 INJECTION, SOLUTION INTRAVENOUS at 09:57

## 2022-10-07 RX ADMIN — FENTANYL CITRATE 50 MCG: 50 INJECTION INTRAMUSCULAR; INTRAVENOUS at 08:07

## 2022-10-07 RX ADMIN — LIDOCAINE HYDROCHLORIDE 100 MG: 20 INJECTION, SOLUTION EPIDURAL; INFILTRATION; INTRACAUDAL; PERINEURAL at 08:07

## 2022-10-07 RX ADMIN — SODIUM CHLORIDE, PRESERVATIVE FREE 10 ML: 5 INJECTION INTRAVENOUS at 21:14

## 2022-10-07 RX ADMIN — GLYCOPYRROLATE 0.1 MG: 0.2 INJECTION, SOLUTION INTRAMUSCULAR; INTRAVENOUS at 10:03

## 2022-10-07 RX ADMIN — PHENYLEPHRINE HYDROCHLORIDE 50 MCG: 10 INJECTION INTRAVENOUS at 08:24

## 2022-10-07 RX ADMIN — GLYCOPYRROLATE 0.2 MG: 0.2 INJECTION, SOLUTION INTRAMUSCULAR; INTRAVENOUS at 09:16

## 2022-10-07 RX ADMIN — Medication 10 MG: at 08:24

## 2022-10-07 RX ADMIN — SODIUM CHLORIDE, SODIUM LACTATE, POTASSIUM CHLORIDE, AND CALCIUM CHLORIDE: 600; 310; 30; 20 INJECTION, SOLUTION INTRAVENOUS at 07:25

## 2022-10-07 RX ADMIN — GLYCOPYRROLATE 0.1 MG: 0.2 INJECTION, SOLUTION INTRAMUSCULAR; INTRAVENOUS at 09:57

## 2022-10-07 RX ADMIN — ONDANSETRON 4 MG: 2 INJECTION INTRAMUSCULAR; INTRAVENOUS at 09:42

## 2022-10-07 RX ADMIN — KETOROLAC TROMETHAMINE 30 MG: 30 INJECTION, SOLUTION INTRAMUSCULAR; INTRAVENOUS at 17:11

## 2022-10-07 RX ADMIN — ROCURONIUM BROMIDE 40 MG: 50 INJECTION, SOLUTION INTRAVENOUS at 08:07

## 2022-10-07 RX ADMIN — FENTANYL CITRATE 25 MCG: 50 INJECTION INTRAMUSCULAR; INTRAVENOUS at 09:02

## 2022-10-07 RX ADMIN — GLYCOPYRROLATE 0.1 MG: 0.2 INJECTION, SOLUTION INTRAMUSCULAR; INTRAVENOUS at 09:20

## 2022-10-07 RX ADMIN — DEXAMETHASONE SODIUM PHOSPHATE 4 MG: 4 INJECTION, SOLUTION INTRAMUSCULAR; INTRAVENOUS at 08:33

## 2022-10-07 RX ADMIN — PROPOFOL 200 MG: 10 INJECTION, EMULSION INTRAVENOUS at 08:07

## 2022-10-07 RX ADMIN — GLYCOPYRROLATE 0.2 MG: 0.2 INJECTION, SOLUTION INTRAMUSCULAR; INTRAVENOUS at 09:52

## 2022-10-07 RX ADMIN — Medication 1 MG: at 10:05

## 2022-10-07 RX ADMIN — GLYCOPYRROLATE 0.1 MG: 0.2 INJECTION, SOLUTION INTRAMUSCULAR; INTRAVENOUS at 10:05

## 2022-10-07 RX ADMIN — ZOLPIDEM TARTRATE 10 MG: 5 TABLET ORAL at 22:24

## 2022-10-07 RX ADMIN — CEFTRIAXONE SODIUM 2000 MG: 2 INJECTION, POWDER, FOR SOLUTION INTRAMUSCULAR; INTRAVENOUS at 08:29

## 2022-10-07 RX ADMIN — FENTANYL CITRATE 25 MCG: 50 INJECTION INTRAMUSCULAR; INTRAVENOUS at 08:48

## 2022-10-07 RX ADMIN — Medication 10 MG: at 09:21

## 2022-10-07 RX ADMIN — Medication 10 MG: at 08:17

## 2022-10-07 RX ADMIN — Medication 1 MG: at 10:03

## 2022-10-07 RX ADMIN — MIDAZOLAM 2 MG: 1 INJECTION INTRAMUSCULAR; INTRAVENOUS at 07:02

## 2022-10-07 RX ADMIN — Medication 1 MG: at 09:56

## 2022-10-07 RX ADMIN — SODIUM CHLORIDE: 9 INJECTION, SOLUTION INTRAVENOUS at 16:01

## 2022-10-07 RX ADMIN — GENTAMICIN SULFATE 80 MG: 80 INJECTION, SOLUTION INTRAVENOUS at 07:39

## 2022-10-07 RX ADMIN — OXYCODONE 5 MG: 5 TABLET ORAL at 11:53

## 2022-10-07 RX ADMIN — Medication 1 MG: at 09:57

## 2022-10-07 RX ADMIN — Medication 1 MG: at 09:52

## 2022-10-07 RX ADMIN — GENTAMICIN SULFATE 80 MG: 80 INJECTION, SOLUTION INTRAVENOUS at 08:05

## 2022-10-07 ASSESSMENT — PAIN DESCRIPTION - DESCRIPTORS
DESCRIPTORS: ACHING
DESCRIPTORS: ACHING

## 2022-10-07 ASSESSMENT — PAIN DESCRIPTION - LOCATION
LOCATION: PENIS

## 2022-10-07 ASSESSMENT — PAIN SCALES - GENERAL
PAINLEVEL_OUTOF10: 6
PAINLEVEL_OUTOF10: 6
PAINLEVEL_OUTOF10: 2
PAINLEVEL_OUTOF10: 5

## 2022-10-07 ASSESSMENT — PAIN DESCRIPTION - ORIENTATION: ORIENTATION: MID

## 2022-10-07 NOTE — H&P
59 y.o. whitel male with symptoms of bladder outlet obstruction with hesitancy, weak stream, double voiding, incomplete emptying, frequency and urgency. His symptoms have been slowly progressive over the past two years. Urine flow test showed very long voiding time of 65 seconds, markedly intermittent stream and a peak flow rate of 8 ml/sec. PMH, FH & ROS - see attached records. PHYSICAL EXAM - WDWN white male in no distress. LUNGS - mild wheezes and ronchi. HEART - regular rhythm with out murmur. GENITALIA - normal male. RECTAL - prostate moderately enlarged without nodularity or induration. IMPRESSION - prostate hypertrophy with bladder outlet obstruction. PLAN - cystoscophy, PROBABLE TURP.

## 2022-10-07 NOTE — PROGRESS NOTES
Marissa taking care of pt called and asked me to write the methadone to be provided by the pharmacy rather than from pt's medications.

## 2022-10-07 NOTE — BRIEF OP NOTE
Brief Postoperative Note      Patient: Mendel Corea  YOB: 1965  MRN: [id number]    Date of Procedure: 10/7/2022    Pre-Op Diagnosis: Bladder outlet obstruction [N32.0]    Post-Op Diagnosis: same plus urethral stricture       Procedure(s):  CYSTOSCOPY TRANSURETHRAL RESECTION PROSTATE/ MEATOPLASTY internal urethrotomy    Surgeon(s):  Ramon Kolb MD  Me p  Assistant:  * No surgical staff found *  [id number]  Anesthesia: General    Estimated Blood Loss (mL): 836 ml    Complications: none    Specimens:   ID Type Source Tests Collected by Time Destination   A : Prostate Tissue Tissue Prostate SURGICAL PATHOLOGY Ramon Kolb MD 10/7/2022 8581        Implants:  * No implants in log *      Drains:   Urinary Catheter 10/07/22 3 Way (Active)   Catheter Indications Perioperative use for selected surgical procedures 10/07/22 1050   Site Assessment No urethral drainage 10/07/22 1050   Urine Color Pink 10/07/22 1045   Urine Appearance Clear 10/07/22 1045   Collection Container Standard 10/07/22 1050   Securement Method Securing device (Describe) 10/07/22 1050   Catheter Best Practices  Drainage tube clipped to bed;Catheter secured to thigh; Tamper seal intact; Bag below bladder;Bag not on floor; Lack of dependent loop in tubing;Drainage bag less than half full 10/07/22 1050   Status Patent 10/07/22 1050   Manual Irrigation Volume Input (mL) 2500 mL 10/07/22 1045   Output (mL) 2500 mL 10/07/22 1045       Findings: medium lobe enlargement    Electronically signed by Jamia Walker MD on 10/7/2022 at 10:59 AM

## 2022-10-07 NOTE — PROGRESS NOTES
This nurse along with Ceci Daigle, LONI counted 28.5 tablets of Xanax in the presence of the pt who is alert and oriented x4. Also, we counted 7 tablets of methadone in the presence of the pt who is A&O x4. Will lock medications up in the narcotic box at nursing station. Has placed call to Dr. Vivian Camargo to place orders for pt to receive these meds by the nurse.

## 2022-10-07 NOTE — PROGRESS NOTES
Pt presented to preop with Methadone and alprazolam tablets. Family has left as patient is being admitted after procedure. Medications counted with Alfreda Renee RN and locked up in narcotic lock box at nurses station. Will return to patient in PACU.

## 2022-10-07 NOTE — ANESTHESIA PROCEDURE NOTES
Airway  Date/Time: 10/7/2022 8:11 AM  Urgency: elective    Airway not difficult    General Information and Staff    Patient location during procedure: OR  Resident/CRNA: FLO Mcnamara - CRNA  Performed: resident/CRNA     Indications and Patient Condition  Indications for airway management: anesthesia  Spontaneous Ventilation: absent  Sedation level: deep  Preoxygenated: yes  Patient position: sniffing  MILS not maintained throughout  Mask difficulty assessment: difficult bag mask (inadequate, unstable or two providers) +/- NMBA    Final Airway Details  Final airway type: endotracheal airway      Successful airway: ETT  Cuffed: yes   Successful intubation technique: direct laryngoscopy  Facilitating devices/methods: intubating stylet  Endotracheal tube insertion site: oral  Blade: Julia  Blade size: #4  ETT size (mm): 8.0  Cormack-Lehane Classification: grade I - full view of glottis  Placement verified by: chest auscultation and capnometry   Measured from: lips  ETT to lips (cm): 24  Number of attempts at approach: 1  Ventilation between attempts: bag mask, 2 hand mask and supraglottic airway    Additional Comments  Two person, two hand mask ventilation with OPA, still difficult to deliver adequate TV. Soft tissue and lips unchanged from preop.  Pt endentulous

## 2022-10-07 NOTE — PROGRESS NOTES
TRANSFER - IN REPORT:    Verbal report received from Carmen yoon Rai Res  being received from PACU for routine progression of patient care      Report consisted of patient's Situation, Background, Assessment and   Recommendations(SBAR). Information from the following report(s) Nurse Handoff Report was reviewed with the receiving nurse. Opportunity for questions and clarification was provided. Assessment completed upon patient's arrival to unit and care assumed.

## 2022-10-07 NOTE — ANESTHESIA POSTPROCEDURE EVALUATION
Department of Anesthesiology  Postprocedure Note    Patient: Yumi Hall  MRN: [id number]  YOB: 1965  Date of evaluation: 10/7/2022      Procedure Summary     Date: 10/07/22 Room / Location: [facility] OP OR 04 / [facility]    Anesthesia Start: 0802 Anesthesia Stop: 5380    Procedure: CYSTOSCOPY TRANSURETHRAL RESECTION PROSTATE/ MEATOPLASTY (Bladder) Diagnosis:       Bladder outlet obstruction      (Bladder outlet obstruction [N32.0])    Surgeons: Jenise Cullen MD Responsible Provider: Pinky Sotelo MD    Anesthesia Type: general ASA Status: 3          Anesthesia Type: No value filed.     Marissa Phase I: Marissa Score: 9    Marissa Phase II:        Anesthesia Post Evaluation    Patient location during evaluation: PACU  Patient participation: complete - patient participated  Level of consciousness: awake  Pain score: 0  Airway patency: patent  Nausea & Vomiting: no nausea and no vomiting  Complications: no  Cardiovascular status: blood pressure returned to baseline and hemodynamically stable  Respiratory status: acceptable, spontaneous ventilation and nonlabored ventilation  Hydration status: euvolemic  Comments: /67   Pulse 65   Temp 98.4 °F (36.9 °C) (Temporal)   Resp 15   Ht 6' (1.829 m)   Wt 255 lb (115.7 kg)   SpO2 94%   BMI 34.58 kg/m²   Multimodal analgesia pain management approach

## 2022-10-07 NOTE — H&P
[address] [place] [address]  HISTORY AND PHYSICAL    Name:  Tyler Simms  MR#:  [id number]  :  1965  ACCOUNT #:  [de-identified]  ADMIT DATE:  10/07/2022    HISTORY OF PRESENT ILLNESS:  The patient is a 64-year-old white male who presented to the office with symptoms of bladder outlet obstruction, and on examination, was found to have prostate enlargement. The patient has a family history of carcinoma of the prostate. His father was treated with radiation and apparently cured. The patient has had a significant weight gain over the past two years of greater than 20 pounds. However, this weight . The patient did have a rather good response to diuretic therapy with Lasix 20 mg. The patient his weight was varied up to 5 pounds each day. The patient does have a history of lung disease secondary to smoking for 30 years, one and half packs per day. The patient has had no recent chest x-ray. The patient suffered from myocardial infarction 12 years ago. He quit smoking at that time. He has had significant improvement in his breathing since he quit his smoking. The patient's bilateral obstructive symptoms include prolonged voiding, interrupted voiding stream including through the bladder, small voiding. The patient reported that he had no digital rectal examination about 10 years. Over this period of time, he was reported to have a p.o. intake which was within normal limits. The patient was infected with COVID in 2022. He reports aggravation of his lung disease with this problem and with diagnosis stage IV lung disease. The patient was hospitalized for one week with COVID infection. The patient has been on tamsulosin for about one year. He initially has some improvement with this medication; however, now feels that he is back to square one with his symptoms. The patient underwent a urine flowin the office. His total volume voided was 170 mL.   His residual urine was 3 ounces. With the urine flow, the patient had a marked interruption of the urinary steam on multiple occasions with complete interruption on the stream.  These symptoms have gradually decreased urinary steam on other occasions. His peak flow for the voiding trial was 8 mL/second; however, the average flow could not be calculated due to the interrupted urinary stream and his voiding time was 65 seconds. According to the patient's slow urinary stream, he underwent abdominopelvic CT scan for further evaluation. The patient had mild BPH with a prostate size estimated at 41 mL. The patient had mild elevation of the base of the bladder secondary to prostate enlargement. The patient does have a left hip prosthesis. ALLERGIES:  NO KNOWN DRUG ALLERGIES. CURRENT MEDICATIONS:  Levothyroxine 75 mcg daily, furosemide 20 mg b.i.d, bupropion 300 mg daily, pantoprazole 40 mg b.i.d, atorvastatin 40 mg daily, lisinopril 20/12.5 mg two daily, tamsulosin 0.4 mg one b.i.d, methadone 5 mg one to two daily, and Xanax 0.5 mg p.r.n. anxiety. Additional past medical history, family history, and review of systems, see patient completed questionnaire, which was reviewed with the patient. PHYSICAL EXAMINATION:  VITAL SIGNS:  Blood pressure 149/91, weight 267, and pulse 99. GENERAL:  Obese  male, in no acute distress. HEENT:  Physiologic. CHEST:  Clear to good chest cage and diaphragmatic excursion. Breath sounds equal bilaterally without wheezes, rhonchi or rales. HEART:  Regular rhythm without murmur. NEUROPSYCHIATRIC:  Oriented x3. No obvious neurologic deficit. LUNGS:  The patient does report some shortness of breath. ABDOMEN:  The patient does have some left lower quadrant tenderness without rebound, no organomegaly, masses or hernia present. GENITALIA:  He has normal male genitalia. Scrotum and scrotal contents are unremarkable.   RECTAL:  Sphincter tone, anal canal, and rectal mucosa unremarkable. Prostate is estimated to be 40 to 50 g in size and is non-nodular. EXTREMITIES:  Full range of motion of all extremities. Muscle mass, muscle strength, pulses, and reflexes equal bilaterally in upper and lower extremities. IMPRESSION:  Prostate enlargement with marked lower urinary tract symptoms and within the bladder. PLAN:  The patient is brought in at this time for cystoscopy. I believe that if he appears to have obstructing tissue that he would undergo a TURP; however, with no evidence of obstructing tissue no other procedure will be done. He will have bilateral retrograde pyeloureterograms.       Jack Michael MD      [id number]  D:  10/06/2022 21:02  T:  10/07/2022 2:45  JOB #:  [id number]

## 2022-10-07 NOTE — OP NOTE
[facility]  OPERATIVE REPORT    Name:  Vincent Martinez  MR#:  [id number]  :  1965  ACCOUNT #:  [de-identified]  DATE OF SERVICE:  10/07/2022    PREOPERATIVE DIAGNOSIS:  Prostate enlargement with bladder outlet obstruction. POSTOPERATIVE DIAGNOSIS:  Prostate enlargement with bladder outlet obstruction plus distal urethral stricture disease. PROCEDURE PERFORMED:  CYSTOSCOPY, INTERNAL URETHROTOMEY, MEATOPLASTY, TRANSURETHRAL RESECTION OF PROSTATE    SURGEON:  Jose Sifuentes MD    ASSISTANT:  0    ANESTHESIA:  General.    COMPLICATIONS:  None. SPECIMENS REMOVED:  PATHOLOGY    IMPLANTS:  0    ESTIMATED BLOOD LOSS:  250 ML    PROCEDURE:  With the patient in the lithotomy position, a sterile field was prepared. An attempt was made to insert the 24-Nauruan resectoscope sheath. The patient was found to have distal urethral stricture disease. The stricture was dilated to 28-Nauruan; however, even with this dilation, could still not insert the resectoscope sheath. Following this, an Elner Footman urethrotome was used for an internal urethrotomy at 6 o'clock position. The urethral mucosa was advanced over the area of incised scar tissue and approximated to the mucosa of the glans penis with interrupted stitches of 4-0 chromic suture. Urethroscopy was carried out which revealed some enlargement of the median lobe of the prostate and significant bladder neck obstruction. With significant pressure on the resectoscope sheath, I was able to pass the sheath over the median lobe of the prostate. Following this, routine resection of prostate was carried out down to the prostatic capsule in all quadrants. Hemostasis was obtained with electrocoagulation. The prostatic chips were irrigated from the bladder and the bladder was drained and irrigated with a 24 triple-lumen Arias catheter. The catheter was then connected to rubber band traction. The procedure was terminated without complication.     Rainer Martinez MD [id number]  D:  10/07/2022 11:58  T:  10/07/2022 16:14  JOB #:  [id number]

## 2022-10-07 NOTE — PERIOP NOTE
Pt states he feels like sloan is not draining well, clear pink output with no clots and irrogant drip rate and sloan output rate equal. Per policy, sloan irrigated with 45 cc sterile NS with return of irrigant and no clots, repeated again with same results. , sloan tubing reconnected with clear light pink drainage no clots, denies pressure now.

## 2022-10-07 NOTE — PROGRESS NOTES
Dr. Keegan Calvert gave this nurse permission via TO with readback  to place order for pt to continue his home medication of Xanax 1 mg  as needed for anxiety. Also, he ordered me to place Toradol 30 mg as needed Q 3 hours  for pain.

## 2022-10-08 PROCEDURE — 2580000003 HC RX 258: Performed by: UROLOGY

## 2022-10-08 PROCEDURE — 2700000000 HC OXYGEN THERAPY PER DAY

## 2022-10-08 PROCEDURE — 6370000000 HC RX 637 (ALT 250 FOR IP): Performed by: UROLOGY

## 2022-10-08 PROCEDURE — G0378 HOSPITAL OBSERVATION PER HR: HCPCS

## 2022-10-08 PROCEDURE — 96375 TX/PRO/DX INJ NEW DRUG ADDON: CPT

## 2022-10-08 PROCEDURE — 96376 TX/PRO/DX INJ SAME DRUG ADON: CPT

## 2022-10-08 PROCEDURE — 6360000002 HC RX W HCPCS: Performed by: UROLOGY

## 2022-10-08 PROCEDURE — 51700 IRRIGATION OF BLADDER: CPT

## 2022-10-08 RX ORDER — FLUOXETINE HYDROCHLORIDE 20 MG/1
20 CAPSULE ORAL DAILY
Status: DISCONTINUED | OUTPATIENT
Start: 2022-10-08 | End: 2022-10-09 | Stop reason: HOSPADM

## 2022-10-08 RX ORDER — LISINOPRIL AND HYDROCHLOROTHIAZIDE 12.5; 1 MG/1; MG/1
1 TABLET ORAL DAILY
Status: DISCONTINUED | OUTPATIENT
Start: 2022-10-08 | End: 2022-10-09 | Stop reason: HOSPADM

## 2022-10-08 RX ORDER — LEVOTHYROXINE SODIUM 0.07 MG/1
75 TABLET ORAL
Status: DISCONTINUED | OUTPATIENT
Start: 2022-10-09 | End: 2022-10-09 | Stop reason: HOSPADM

## 2022-10-08 RX ADMIN — FLUOXETINE HYDROCHLORIDE 20 MG: 20 CAPSULE ORAL at 20:44

## 2022-10-08 RX ADMIN — KETOROLAC TROMETHAMINE 30 MG: 30 INJECTION, SOLUTION INTRAMUSCULAR; INTRAVENOUS at 22:48

## 2022-10-08 RX ADMIN — HYDROMORPHONE HYDROCHLORIDE 1 MG: 1 INJECTION, SOLUTION INTRAMUSCULAR; INTRAVENOUS; SUBCUTANEOUS at 16:00

## 2022-10-08 RX ADMIN — LISINOPRIL AND HYDROCHLOROTHIAZIDE 1 TABLET: 12.5; 1 TABLET ORAL at 18:09

## 2022-10-08 RX ADMIN — HYDROMORPHONE HYDROCHLORIDE 1 MG: 1 INJECTION, SOLUTION INTRAMUSCULAR; INTRAVENOUS; SUBCUTANEOUS at 07:25

## 2022-10-08 RX ADMIN — METHADONE HYDROCHLORIDE 5 MG: 5 TABLET ORAL at 09:00

## 2022-10-08 RX ADMIN — ALPRAZOLAM 1 MG: 0.5 TABLET ORAL at 19:19

## 2022-10-08 RX ADMIN — HYDROMORPHONE HYDROCHLORIDE 1 MG: 1 INJECTION, SOLUTION INTRAMUSCULAR; INTRAVENOUS; SUBCUTANEOUS at 11:45

## 2022-10-08 RX ADMIN — KETOROLAC TROMETHAMINE 30 MG: 30 INJECTION, SOLUTION INTRAMUSCULAR; INTRAVENOUS at 17:29

## 2022-10-08 RX ADMIN — SODIUM CHLORIDE: 9 INJECTION, SOLUTION INTRAVENOUS at 03:53

## 2022-10-08 RX ADMIN — SODIUM CHLORIDE, PRESERVATIVE FREE 10 ML: 5 INJECTION INTRAVENOUS at 20:44

## 2022-10-08 RX ADMIN — ALPRAZOLAM 1 MG: 0.5 TABLET ORAL at 09:00

## 2022-10-08 ASSESSMENT — PAIN DESCRIPTION - ORIENTATION
ORIENTATION: MID
ORIENTATION: LOWER
ORIENTATION: LOWER
ORIENTATION: MID
ORIENTATION: LOWER
ORIENTATION: ANTERIOR

## 2022-10-08 ASSESSMENT — PAIN DESCRIPTION - LOCATION
LOCATION: PELVIS
LOCATION: BACK
LOCATION: BACK
LOCATION: GROIN
LOCATION: GROIN
LOCATION: PENIS
LOCATION: GROIN
LOCATION: GROIN

## 2022-10-08 ASSESSMENT — PAIN DESCRIPTION - ONSET
ONSET: ON-GOING

## 2022-10-08 ASSESSMENT — PAIN DESCRIPTION - FREQUENCY
FREQUENCY: CONTINUOUS
FREQUENCY: INTERMITTENT
FREQUENCY: CONTINUOUS

## 2022-10-08 ASSESSMENT — PAIN - FUNCTIONAL ASSESSMENT
PAIN_FUNCTIONAL_ASSESSMENT: ACTIVITIES ARE NOT PREVENTED

## 2022-10-08 ASSESSMENT — PAIN SCALES - GENERAL
PAINLEVEL_OUTOF10: 7
PAINLEVEL_OUTOF10: 3
PAINLEVEL_OUTOF10: 6
PAINLEVEL_OUTOF10: 2
PAINLEVEL_OUTOF10: 7
PAINLEVEL_OUTOF10: 7
PAINLEVEL_OUTOF10: 4
PAINLEVEL_OUTOF10: 4
PAINLEVEL_OUTOF10: 6

## 2022-10-08 ASSESSMENT — PAIN DESCRIPTION - DESCRIPTORS
DESCRIPTORS: ACHING
DESCRIPTORS: SORE
DESCRIPTORS: ACHING
DESCRIPTORS: SORE;PRESSURE
DESCRIPTORS: ACHING

## 2022-10-08 NOTE — PROGRESS NOTES
Patient's irrigation unclamped. CBI running and traction in place per orders. Will inform night shift nurse that irrigation needs to be clamped and traction needs to be taken off per instructions from Dr. Aj Gutierrez.

## 2022-10-08 NOTE — PROGRESS NOTES
Hourly rounds complete this shift, Patient c/o pain and bleeding at the sloan site, patient accidentally grabbed the hand  wipes to wipe himself instead of the baby wipes, patient states, \" It burned really bad. \" CBI stopped at 5 am and tension was released per orders. Bed in low, locked position, call light and bedside table within reach,  all needs met. Will continue to monitor Report to day shift nurse.

## 2022-10-08 NOTE — PROGRESS NOTES
Pt has multiple minor complaints including bleeding from meatus, constant bladder spasms, back pain from lack of activity, etc.  Afebrile  No nausea. .     P.E.  abd - nontender. Genitalia - no active bleeding at this time. Urine - blood tinged. PLAN - 1. D/c iv fluids, SCDs, leave irrigation clamped and ambulate. Pending the level of hematuria after ambulaton a decision will be made re - discharge today with cath in place or wait until tomorrow and possibly discharge after cath is removed.

## 2022-10-09 VITALS
HEART RATE: 71 BPM | RESPIRATION RATE: 18 BRPM | BODY MASS INDEX: 34.54 KG/M2 | TEMPERATURE: 98.1 F | DIASTOLIC BLOOD PRESSURE: 83 MMHG | WEIGHT: 255 LBS | SYSTOLIC BLOOD PRESSURE: 138 MMHG | OXYGEN SATURATION: 97 % | HEIGHT: 72 IN

## 2022-10-09 PROBLEM — Z09 SURGERY FOLLOW-UP: Status: RESOLVED | Noted: 2022-10-07 | Resolved: 2022-10-09

## 2022-10-09 PROBLEM — Z90.79 S/P TURP: Status: RESOLVED | Noted: 2022-10-07 | Resolved: 2022-10-09

## 2022-10-09 PROCEDURE — G0378 HOSPITAL OBSERVATION PER HR: HCPCS

## 2022-10-09 PROCEDURE — 1100000000 HC RM PRIVATE

## 2022-10-09 PROCEDURE — 2580000003 HC RX 258: Performed by: UROLOGY

## 2022-10-09 PROCEDURE — 51700 IRRIGATION OF BLADDER: CPT

## 2022-10-09 PROCEDURE — 6370000000 HC RX 637 (ALT 250 FOR IP): Performed by: UROLOGY

## 2022-10-09 PROCEDURE — 6360000002 HC RX W HCPCS: Performed by: UROLOGY

## 2022-10-09 PROCEDURE — 96376 TX/PRO/DX INJ SAME DRUG ADON: CPT

## 2022-10-09 RX ORDER — ATROPA BELLADONNA AND OPIUM 16.2; 3 MG/1; MG/1
30 SUPPOSITORY RECTAL EVERY 8 HOURS PRN
Status: DISCONTINUED | OUTPATIENT
Start: 2022-10-09 | End: 2022-10-09 | Stop reason: HOSPADM

## 2022-10-09 RX ORDER — SODIUM CHLORIDE 0.9 % (FLUSH) 0.9 %
5-40 SYRINGE (ML) INJECTION PRN
Status: DISCONTINUED | OUTPATIENT
Start: 2022-10-09 | End: 2022-10-09 | Stop reason: HOSPADM

## 2022-10-09 RX ORDER — SODIUM CHLORIDE 9 MG/ML
INJECTION, SOLUTION INTRAVENOUS CONTINUOUS
Status: DISCONTINUED | OUTPATIENT
Start: 2022-10-09 | End: 2022-10-09 | Stop reason: HOSPADM

## 2022-10-09 RX ORDER — SODIUM CHLORIDE 9 MG/ML
INJECTION, SOLUTION INTRAVENOUS PRN
Status: DISCONTINUED | OUTPATIENT
Start: 2022-10-09 | End: 2022-10-09 | Stop reason: HOSPADM

## 2022-10-09 RX ORDER — SODIUM CHLORIDE 0.9 % (FLUSH) 0.9 %
5-40 SYRINGE (ML) INJECTION EVERY 12 HOURS SCHEDULED
Status: DISCONTINUED | OUTPATIENT
Start: 2022-10-09 | End: 2022-10-09 | Stop reason: HOSPADM

## 2022-10-09 RX ADMIN — HYDROMORPHONE HYDROCHLORIDE 1 MG: 1 INJECTION, SOLUTION INTRAMUSCULAR; INTRAVENOUS; SUBCUTANEOUS at 14:19

## 2022-10-09 RX ADMIN — KETOROLAC TROMETHAMINE 30 MG: 30 INJECTION, SOLUTION INTRAMUSCULAR; INTRAVENOUS at 04:49

## 2022-10-09 RX ADMIN — KETOROLAC TROMETHAMINE 30 MG: 30 INJECTION, SOLUTION INTRAMUSCULAR; INTRAVENOUS at 11:26

## 2022-10-09 RX ADMIN — HYDROMORPHONE HYDROCHLORIDE 1 MG: 1 INJECTION, SOLUTION INTRAMUSCULAR; INTRAVENOUS; SUBCUTANEOUS at 10:14

## 2022-10-09 RX ADMIN — SODIUM CHLORIDE, PRESERVATIVE FREE 10 ML: 5 INJECTION INTRAVENOUS at 09:15

## 2022-10-09 RX ADMIN — METHADONE HYDROCHLORIDE 5 MG: 5 TABLET ORAL at 09:14

## 2022-10-09 RX ADMIN — LEVOTHYROXINE SODIUM 75 MCG: 0.07 TABLET ORAL at 04:49

## 2022-10-09 RX ADMIN — FLUOXETINE HYDROCHLORIDE 20 MG: 20 CAPSULE ORAL at 09:14

## 2022-10-09 RX ADMIN — LISINOPRIL AND HYDROCHLOROTHIAZIDE 1 TABLET: 12.5; 1 TABLET ORAL at 09:14

## 2022-10-09 ASSESSMENT — PAIN SCALES - GENERAL
PAINLEVEL_OUTOF10: 8
PAINLEVEL_OUTOF10: 6
PAINLEVEL_OUTOF10: 6
PAINLEVEL_OUTOF10: 0
PAINLEVEL_OUTOF10: 1
PAINLEVEL_OUTOF10: 8
PAINLEVEL_OUTOF10: 3
PAINLEVEL_OUTOF10: 6
PAINLEVEL_OUTOF10: 4

## 2022-10-09 ASSESSMENT — PAIN - FUNCTIONAL ASSESSMENT
PAIN_FUNCTIONAL_ASSESSMENT: PREVENTS OR INTERFERES SOME ACTIVE ACTIVITIES AND ADLS
PAIN_FUNCTIONAL_ASSESSMENT: ACTIVITIES ARE NOT PREVENTED
PAIN_FUNCTIONAL_ASSESSMENT: PREVENTS OR INTERFERES SOME ACTIVE ACTIVITIES AND ADLS
PAIN_FUNCTIONAL_ASSESSMENT: PREVENTS OR INTERFERES SOME ACTIVE ACTIVITIES AND ADLS

## 2022-10-09 ASSESSMENT — PAIN DESCRIPTION - DESCRIPTORS
DESCRIPTORS: ACHING
DESCRIPTORS: PRESSURE

## 2022-10-09 ASSESSMENT — PAIN DESCRIPTION - PAIN TYPE
TYPE: ACUTE PAIN

## 2022-10-09 ASSESSMENT — PAIN DESCRIPTION - ONSET
ONSET: ON-GOING

## 2022-10-09 ASSESSMENT — PAIN DESCRIPTION - FREQUENCY
FREQUENCY: CONTINUOUS

## 2022-10-09 ASSESSMENT — PAIN DESCRIPTION - ORIENTATION
ORIENTATION: LOWER

## 2022-10-09 ASSESSMENT — PAIN DESCRIPTION - LOCATION
LOCATION: PELVIS

## 2022-10-09 NOTE — PROGRESS NOTES
Contacted Dr. Aj Gutierrez as pt requested. Pt wants Arias removed. Pt experiencing intermittent pain/ pressure in bladder. CBI clamped and tension removed at 0800 per Dr. Aj Gutierrez. Hand irrigation complete and pt has no clots. Arias draining well. Pt encouraged to walk in larson and sit up per Dr. Aj Gutierrez. PRN Dilaudid given per MAR. Pt ambulating in larson at this time. Dr. Aj Gutierrez to assess for Arias removal later this afternoon.

## 2022-10-09 NOTE — PROGRESS NOTES
Pt is alert and oriented x4. Pt in bed, resting. Bed in low position, wheels locked, call light within reach, instructed to call with any needs. Hourly rounds completed this shift. NAD noted. VSS. Pt has c/o pain, treated per MAR. IV is SL, and dressing is clean, dry, and intact. CBI infusing at slow rate with pink/yellow output. Report to be given to day shift nurse.

## 2022-10-09 NOTE — PROGRESS NOTES
Pt seen and examined. Urine was very slightly tinged. Arias catheter was removed without difficulty. Discharge plans discussed with pt and he will be discharged when he is able to void.

## 2022-10-09 NOTE — PROGRESS NOTES
Discharge instructions given. Education provided. All questions answered and verbally voiced understanding. Medication changes and follow up appointments discussed. Pt has all needed medications at home. AVS reviewed,. Signed, and placed in chart. Copy provided for pt. Pt spouse aware of discharge and at bedside. Spouse plans to transport pt home. Pt aware to call when able to void and staff will assist to vehicle. Update 1706: Pt voided over 200 mL and able to discharge. Peripheral IV removed with tip intact. Pt aware to call desk when ready to discharge.

## 2022-10-09 NOTE — DISCHARGE SUMMARY
48 hours ago pt underwent a TURP and an internal urethrotomy with meatoplasty. Post op pt was in pain requiring parenteral analgesics. On the second post op day hematuria had cleared satisfactorily to allow removal of the catheter. When pt has voided he will be discharged home. Path report is still pending.

## 2022-10-09 NOTE — CARE COORDINATION
CM reviewed patient's chart. Patient presents to ER with chief complaint of severe anxiety and panic attacks. After further evaluation, patient had bladder outlet obstruction. Procedure conducted on 10/7, cystoscopy transurethral resection prostate/meatoplasty internal urethrotomy. Patient is insured with PCP listed and spouse listed as emergency contact. No PT/OT consults placed/needed at this time. DCP is for patient to return home with spouse when medically stable. Please consult CM if any needs should arise. 2:30 - DC order put in. Patient discharging home with no needs.      ASSESSMENT NOTE    Attending Physician: Frankie Campos MD  Admit Problem: Bladder outlet obstruction [N32.0]  S/P TURP [Z90.79]  Surgery follow-up [Z09]  Date/Time of Admission: 10/7/2022  5:41 AM  Problem List:  Patient Active Problem List   Diagnosis    Healthcare maintenance    CAD in native artery    HTN (hypertension)    Chronic back pain    COPD (chronic obstructive pulmonary disease) (HCC)    Hypothyroidism (acquired)    GERD (gastroesophageal reflux disease)    Panic anxiety syndrome    History of tobacco use    Annual physical exam    Insomnia    Chronic prescription benzodiazepine use    OA (osteoarthritis)    S/P lumbar spine operation    Allergic conjunctivitis    Palpitations    Hypotestosteronemia    ED (erectile dysfunction)    JORGE (obstructive sleep apnea)    IGT (impaired glucose tolerance)    Post-COVID-19 syndrome    BPH (benign prostatic hyperplasia)    Methadone dependence (HCC)    HLD (hyperlipidemia)    MDD (major depressive disorder)    CRLD (chronic restrictive lung disease)    S/P TURP    Surgery follow-up       Service Assessment  Patient Orientation Alert and Oriented   Cognition Alert   History Provided By (P) Medical Record   Primary Caregiver (P) Self   Accompanied By/Relationship     Support Systems (P) Spouse/Significant Other   Patient's Healthcare Decision Maker is:     PCP Verified by HOSEA (P) Yes   Last Visit to PCP     Prior Functional Level (P) Independent in ADLs/IADLs   Current Functional Level (P) Independent in ADLs/IADLs   Can patient return to prior living arrangement (P) Yes   Ability to make needs known: (P) Good   Family able to assist with home care needs:     Would you like for me to discuss the discharge plan with any other family members/significant others, and if so, who? Financial Resources     Community Resources     CM/SW Referral       Social/Functional History  Lives With     Type of [address] Access     Entrance Stairs - Number of Steps     Entrance Stairs - Rails     Bathroom Shower/Tub     Bathroom Toilet     Bathroom Equipment     Bathroom Accessibility     Home Equipment     Receives Help From     ADL Assistance (P) TODD Pierce 1     Meal Prep     Laundry     Vacuuming     Cleaning     Gardening     Yard Work     Driving     Shopping          Other (Comment)     [address] [employer] Paying/Finance Responsibility     [facility] Management     Other (Comment)     Ambulation Assistance     Transfer Assistance     Active      Patient's  Info     Mode of Transportation     Education     Occupation     Type of Occupation       Discharge Planning   Type of [address] [facility] (P) Spouse/Significant Other   Current Services Prior To Admission (P) None   Potential Assistance Needed (P) N/A   DME     DME     DME Ordered? (P) No   Potential Assistance Purchasing Medications     Meds-to-Beds: Does the patient want to have any new prescriptions delivered to bedside prior to discharge? Type of Home Care Services     Patient expects to be discharged to:      Follow Up Appointment: Best Day/Time     One/Two Story Residence:     # of Interior Steps     Height of Each Step (in)     [employer] Available     History of Falls? Services At/After Discharge  Transition of Care Consult (CM Consult): Internal Home Health     Internal Hospice     Reason Outside Agency [address]     Partner SNF     Reason Why Partner SNF Not Chosen     Internal Comfort Care     Reason Outside [address] Discharge (P) None   [employer] Information Provided? Mode of Transport at [facility] Time of Discharge     Confirm Follow Up Transport       Condition of Participation: Discharge Planning  The plan for Transition of Care is related to the following treatment goals: The Patient and/or Patient Representative was provided with a Choice of Provider? Name of the Patient Representative who was provided with the Choice of Provider and agrees with the Discharge Plan? The Patient and/or Patient Representative Agree with the Discharge Plan? Freedom of Choice list was provided with basic dialogue that supports the individualized plan of care/goals, treatment preferences, and shares the quality data associated with the providers?        Documentation for Discharge Appeal  Discharge Appealed by     Date notified by QIO of appeal request:     Time notified by QIO of appeal request:     Detailed Notice of Discharge given to:     Date Notice of Discharge given:     Time Notice of Discharge given:     Date records sent to QIO     Time records sent to Moses Levine     Date Notified of Outcome     Time Notified of Outcome     Outcome of appeal           [address] [facility] 10/09/22 10:38 AM

## 2022-10-24 DIAGNOSIS — F41.0 PANIC DISORDER: ICD-10-CM

## 2022-10-24 DIAGNOSIS — F33.1 MDD (MAJOR DEPRESSIVE DISORDER), RECURRENT EPISODE, MODERATE (HCC): ICD-10-CM

## 2022-10-24 DIAGNOSIS — E03.9 HYPOTHYROIDISM (ACQUIRED): ICD-10-CM

## 2022-10-24 DIAGNOSIS — E34.9 HYPOTESTOSTERONEMIA: ICD-10-CM

## 2022-10-24 DIAGNOSIS — G47.00 INSOMNIA, UNSPECIFIED TYPE: ICD-10-CM

## 2022-10-24 DIAGNOSIS — K21.9 GASTROESOPHAGEAL REFLUX DISEASE, UNSPECIFIED WHETHER ESOPHAGITIS PRESENT: ICD-10-CM

## 2022-10-24 DIAGNOSIS — Z79.899 CHRONIC PRESCRIPTION BENZODIAZEPINE USE: ICD-10-CM

## 2022-10-24 DIAGNOSIS — E78.5 HYPERLIPIDEMIA, UNSPECIFIED HYPERLIPIDEMIA TYPE: ICD-10-CM

## 2022-10-24 DIAGNOSIS — F41.0 PANIC ANXIETY SYNDROME: ICD-10-CM

## 2022-10-24 RX ORDER — ALBUTEROL SULFATE 90 UG/1
2 AEROSOL, METERED RESPIRATORY (INHALATION) EVERY 4 HOURS PRN
Qty: 18 G | Refills: 5 | Status: SHIPPED | OUTPATIENT
Start: 2022-10-24

## 2022-10-24 RX ORDER — ALBUTEROL SULFATE 2.5 MG/3ML
2.5 SOLUTION RESPIRATORY (INHALATION) EVERY 4 HOURS PRN
Qty: 120 EACH | Refills: 5 | Status: SHIPPED | OUTPATIENT
Start: 2022-10-24

## 2022-10-24 RX ORDER — PANTOPRAZOLE SODIUM 40 MG/1
40 TABLET, DELAYED RELEASE ORAL
Qty: 60 TABLET | Refills: 5 | Status: SHIPPED | OUTPATIENT
Start: 2022-10-24

## 2022-10-24 RX ORDER — METHADONE HYDROCHLORIDE 5 MG/1
TABLET ORAL
Status: CANCELLED | OUTPATIENT
Start: 2022-10-24

## 2022-10-24 RX ORDER — ZOLPIDEM TARTRATE 10 MG/1
10 TABLET ORAL EVERY EVENING
Qty: 30 TABLET | Refills: 5 | Status: SHIPPED | OUTPATIENT
Start: 2022-10-24 | End: 2023-10-25

## 2022-10-24 RX ORDER — PROCHLORPERAZINE MALEATE 10 MG
10 TABLET ORAL NIGHTLY
Qty: 30 TABLET | Refills: 5 | Status: SHIPPED | OUTPATIENT
Start: 2022-10-24

## 2022-10-24 RX ORDER — FLUOXETINE HYDROCHLORIDE 20 MG/1
20 CAPSULE ORAL DAILY
Qty: 30 CAPSULE | Refills: 5 | Status: SHIPPED | OUTPATIENT
Start: 2022-10-24

## 2022-10-24 RX ORDER — LISINOPRIL AND HYDROCHLOROTHIAZIDE 12.5; 1 MG/1; MG/1
1 TABLET ORAL DAILY
Qty: 30 TABLET | Refills: 5 | Status: SHIPPED | OUTPATIENT
Start: 2022-10-24

## 2022-10-24 RX ORDER — MEMANTINE HYDROCHLORIDE 10 MG/1
10 TABLET ORAL DAILY
Qty: 30 TABLET | Refills: 5 | Status: SHIPPED | OUTPATIENT
Start: 2022-10-24 | End: 2022-11-08 | Stop reason: SDUPTHER

## 2022-10-24 RX ORDER — ALPRAZOLAM 1 MG/1
1-2 TABLET ORAL DAILY PRN
Qty: 60 TABLET | Refills: 5 | Status: SHIPPED | OUTPATIENT
Start: 2022-10-24 | End: 2023-10-24

## 2022-10-24 RX ORDER — FAMOTIDINE 40 MG/1
40 TABLET, FILM COATED ORAL 2 TIMES DAILY PRN
Qty: 60 TABLET | Refills: 5 | Status: SHIPPED | OUTPATIENT
Start: 2022-10-24

## 2022-10-24 RX ORDER — TESTOSTERONE CYPIONATE 200 MG/ML
200 INJECTION INTRAMUSCULAR WEEKLY
Qty: 5 ML | Refills: 5 | Status: SHIPPED | OUTPATIENT
Start: 2022-10-24 | End: 2023-10-24

## 2022-10-24 RX ORDER — FUROSEMIDE 20 MG/1
20 TABLET ORAL 2 TIMES DAILY
Qty: 60 TABLET | Refills: 5 | Status: SHIPPED | OUTPATIENT
Start: 2022-10-24

## 2022-10-24 RX ORDER — ONDANSETRON 8 MG/1
8 TABLET, ORALLY DISINTEGRATING ORAL EVERY 8 HOURS PRN
Qty: 90 TABLET | Refills: 5 | Status: SHIPPED | OUTPATIENT
Start: 2022-10-24

## 2022-10-24 RX ORDER — METOPROLOL SUCCINATE 25 MG/1
25 TABLET, EXTENDED RELEASE ORAL DAILY
Qty: 30 TABLET | Refills: 5 | Status: SHIPPED | OUTPATIENT
Start: 2022-10-24

## 2022-10-24 RX ORDER — NITROGLYCERIN 0.4 MG/1
0.4 TABLET SUBLINGUAL PRN
Qty: 25 TABLET | Status: CANCELLED | OUTPATIENT
Start: 2022-10-24

## 2022-10-24 RX ORDER — LEVOTHYROXINE SODIUM 0.07 MG/1
75 TABLET ORAL DAILY
Qty: 30 TABLET | Refills: 5 | Status: SHIPPED | OUTPATIENT
Start: 2022-10-24

## 2022-10-24 RX ORDER — CYCLOBENZAPRINE HCL 10 MG
10 TABLET ORAL 3 TIMES DAILY PRN
Qty: 90 TABLET | Refills: 5 | Status: SHIPPED | OUTPATIENT
Start: 2022-10-24

## 2022-10-24 RX ORDER — TADALAFIL 20 MG/1
20 TABLET ORAL EVERY OTHER DAY
Qty: 30 TABLET | Status: CANCELLED | OUTPATIENT
Start: 2022-10-24

## 2022-10-24 RX ORDER — ATORVASTATIN CALCIUM 40 MG/1
40 TABLET, FILM COATED ORAL DAILY
Qty: 30 TABLET | Refills: 5 | Status: SHIPPED | OUTPATIENT
Start: 2022-10-24

## 2022-10-24 NOTE — TELEPHONE ENCOUNTER
Appears that pt sent multiple  messages regarding this last week, on Oct 15th, 18th, & 20th, but this is first time today on the 24th that I've received any notification regarding these messages? ?    Will allow RF of pts as they are moving to [address] [place] [address] & will need time to establish care with a new local PCP. One exception to the med RF's is Methadone; no one from our office prescribes this med as it requires a certain certification that we don't have here.
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
## 2022-11-02 DIAGNOSIS — M16.11 PRIMARY OSTEOARTHRITIS OF RIGHT HIP: Primary | ICD-10-CM

## 2022-11-08 RX ORDER — MEMANTINE HYDROCHLORIDE 10 MG/1
10 TABLET ORAL DAILY
Qty: 30 TABLET | Refills: 5 | Status: SHIPPED | OUTPATIENT
Start: 2022-11-08

## 2022-11-08 NOTE — TELEPHONE ENCOUNTER
Patient would like his Memantine 10 mg sent to the [facility] [address] [facility] in [place]. The cost is less with the [employer] coupon than at [place]. Medication has been pended for approval with correct pharmacy attached.

## 2022-11-21 ENCOUNTER — TELEPHONE (OUTPATIENT)
Dept: PULMONOLOGY | Age: 57
End: 2022-11-21

## 2022-11-21 NOTE — TELEPHONE ENCOUNTER
----- Message from Rose Monge MD sent at 10/3/2022  7:56 AM EDT -----  Regarding: FW: New stress test        ----- Message -----  From: Hoang Nelson RN  Sent: 9/30/2022   4:18 PM EDT  To: Rose Monge MD  Subject: FW: New stress test                                ----- Message -----  From: Jhony Giles  Sent: 9/24/2022   6:57 PM EDT  To: , #  Subject: New stress test                                  [facility],  Gianadario Lopez been having severe panic attack again requiring me to go to the ER. My Cardiologist did a new stress test, and new things showed up. He goes through [facility]. I hate not being able to get oxygen even for a comfort thing. I go to the ER but they blow me off saying my oxygen is fine, but to me it doesn't feel fine. When I get 3 litters it helps relax me a little more until I can be seen. But insurance won't pay for it unless my level are low. And they are low when it starts. I need some for my car for when I am having issues breathing. This totally freaks me out when I go through this. What do you suggest. A walking oxygen test won't go low because my health won't let me push it that far. Because my back and hips and weight.     Tawana Culver

## 2022-11-29 ENCOUNTER — TELEPHONE (OUTPATIENT)
Dept: PULMONOLOGY | Age: 57
End: 2022-11-29

## 2022-11-29 NOTE — TELEPHONE ENCOUNTER
Spouse called office reporting patient delirious and currently locked in a dark room, expressed to  that she has taken him to ED several times and he has bee sent back home. Accepted phone call. Reporting that they understood he was having \"neurophysicological problems\" related heart problems. 4 ER visits in last 6 days, unable to sleep; hasn't had methadone in 10 days; having trouble w/ decision making; names primary problem of heart racing; notes her son is there with her to help w/ situation control; repeats several times \"I don't know what to do\"; advised to return to ED and insist that he be admitted & share the information in [address] w/ communication w/ Dr. Sindy Mcadams.

## 2022-12-01 ENCOUNTER — TELEPHONE (OUTPATIENT)
Dept: FAMILY MEDICINE CLINIC | Facility: CLINIC | Age: 57
End: 2022-12-01

## 2022-12-01 DIAGNOSIS — G89.29 CHRONIC BACK PAIN, UNSPECIFIED BACK LOCATION, UNSPECIFIED BACK PAIN LATERALITY: ICD-10-CM

## 2022-12-01 DIAGNOSIS — M54.9 CHRONIC BACK PAIN, UNSPECIFIED BACK LOCATION, UNSPECIFIED BACK PAIN LATERALITY: ICD-10-CM

## 2022-12-01 DIAGNOSIS — M15.9 OSTEOARTHRITIS OF MULTIPLE JOINTS, UNSPECIFIED OSTEOARTHRITIS TYPE: ICD-10-CM

## 2022-12-01 DIAGNOSIS — Z98.890 S/P LUMBAR SPINE OPERATION: ICD-10-CM

## 2022-12-01 DIAGNOSIS — F11.20 METHADONE DEPENDENCE (HCC): Primary | ICD-10-CM

## 2022-12-01 NOTE — TELEPHONE ENCOUNTER
Pt called stating he is in [place] and needing and emergency referral to pain management. [facility].      Fax: [phone]  Direct line is [phone]

## 2023-01-05 ENCOUNTER — PREP FOR PROCEDURE (OUTPATIENT)
Dept: ORTHOPEDIC SURGERY | Age: 58
End: 2023-01-05

## 2023-01-05 DIAGNOSIS — M16.11 PRIMARY OSTEOARTHRITIS OF RIGHT HIP: Primary | ICD-10-CM

## 2023-01-05 RX ORDER — SODIUM CHLORIDE 0.9 % (FLUSH) 0.9 %
5-40 SYRINGE (ML) INJECTION PRN
Status: CANCELLED | OUTPATIENT
Start: 2023-01-05

## 2023-01-05 RX ORDER — SODIUM CHLORIDE 0.9 % (FLUSH) 0.9 %
5-40 SYRINGE (ML) INJECTION EVERY 12 HOURS SCHEDULED
Status: CANCELLED | OUTPATIENT
Start: 2023-01-05

## 2023-01-05 RX ORDER — SODIUM CHLORIDE 9 MG/ML
INJECTION, SOLUTION INTRAVENOUS PRN
Status: CANCELLED | OUTPATIENT
Start: 2023-01-05

## 2023-01-06 ENCOUNTER — TELEPHONE (OUTPATIENT)
Dept: ORTHOPEDIC SURGERY | Age: 58
End: 2023-01-06

## 2023-01-06 NOTE — TELEPHONE ENCOUNTER
Pt has now moved to  [place]. He needs to  cancel his February surgery and also ask if you can email him sheri witt for  insurance  purposes stating that he has been treating  here and  is need of  a knee  replacement.   I also sent a msg to medical records to  mail him his  medical records   Phone number is the same and I updated his mailing  address

## 2025-05-19 ENCOUNTER — CLINICAL DOCUMENTATION (OUTPATIENT)
Dept: ORTHOPEDIC SURGERY | Age: 60
End: 2025-05-19

## (undated) DEVICE — CANNULA NSL ORAL AD FOR CAPNOFLEX CO2 O2 AIRLFE

## (undated) DEVICE — SUTURE VCRL SZ 3-0 L27IN ABSRB UD L26MM CT-2 1/2 CIR J232H

## (undated) DEVICE — SUTURE STRATAFIX SZ 3-0 L30CM NONABSORBABLE UD L19MM FS-2 SXMP2B408

## (undated) DEVICE — GAUZE,SPONGE,8"X4",12PLY,XRAY,STRL,LF: Brand: MEDLINE

## (undated) DEVICE — SOLUTION IV 1000ML 0.9% SOD CHL

## (undated) DEVICE — CONTAINER PREFIL FRMLN 40ML --

## (undated) DEVICE — HANDPIECE SET WITH COAXIAL HIGH FLOW TIP AND SUCTION TUBE: Brand: INTERPULSE

## (undated) DEVICE — JELLY LUBRICATING 10GM PREFIL SYR LUBE

## (undated) DEVICE — T4 HOOD

## (undated) DEVICE — TRAY PREP DRY W/ PREM GLV 2 APPL 6 SPNG 2 UNDPD 1 OVERWRAP

## (undated) DEVICE — SUTURE FIBERWIRE SZ 2 W/ TAPERED NEEDLE BLUE L38IN NONABSORB BLU L26.5MM 1/2 CIRCLE AR7200

## (undated) DEVICE — ABDOMINAL PAD: Brand: DERMACEA

## (undated) DEVICE — SOLUTION IRRIGATION GLYCINE 1.5% 3000 ML USP 4/CA

## (undated) DEVICE — TURP TURB: Brand: MEDLINE INDUSTRIES, INC.

## (undated) DEVICE — CONNECTOR TBNG OD5-7MM O2 END DISP

## (undated) DEVICE — SOLUTION IV 250ML 0.9% SOD CHL CLR INJ FLX BG CONT PRT CLSR

## (undated) DEVICE — DRAPE,U/SHT,SPLIT,FILM,60X84,STERILE: Brand: MEDLINE

## (undated) DEVICE — BIPOLAR SEALER 23-112-1 AQM 6.0: Brand: AQUAMANTYS ®

## (undated) DEVICE — STOCKINETTE TUBE 6X48 -- MEDICHOICE

## (undated) DEVICE — SYR 10ML LUER LOK 1/5ML GRAD --

## (undated) DEVICE — TOTAL HIP DR WATSON: Brand: MEDLINE INDUSTRIES, INC.

## (undated) DEVICE — STRYKER PERFORMANCE SERIES SAGITTAL BLADE: Brand: STRYKER PERFORMANCE SERIES

## (undated) DEVICE — SYSTEM SKIN CLSR 22CM DERMBND PRINEO

## (undated) DEVICE — IMPLANTABLE DEVICE
Type: IMPLANTABLE DEVICE | Site: HIP | Status: NON-FUNCTIONAL
Removed: 2021-09-03

## (undated) DEVICE — DRAPE TWL SURG 16X26IN BLU ORB04] ALLCARE INC]

## (undated) DEVICE — ELECTRODE LOOP DIA24-26FR YEL CUT BPLR DSTL TIP

## (undated) DEVICE — BLOCK BITE AD 60FR W/ VELC STRP ADDRESSES MOST PT AND

## (undated) DEVICE — GOWN,AURORA,FABRIC-REINFORCED,2XL: Brand: MEDLINE

## (undated) DEVICE — CATHETER URETH 24FR BLLN 30CC STD LTX 3 W TWO OPP DRNGE EYE

## (undated) DEVICE — KENDALL RADIOLUCENT FOAM MONITORING ELECTRODE RECTANGULAR SHAPE: Brand: KENDALL

## (undated) DEVICE — (D)PREP SKN CHLRAPRP APPL 26ML -- CONVERT TO ITEM 371833

## (undated) DEVICE — PAD,NON-ADHERENT,3X8,STERILE,LF,1/PK: Brand: MEDLINE

## (undated) DEVICE — 3M™ STERI-DRAPE™ INSTRUMENT POUCH 1018: Brand: STERI-DRAPE™

## (undated) DEVICE — KENDALL SCD EXPRESS SLEEVES, KNEE LENGTH, MEDIUM: Brand: KENDALL SCD

## (undated) DEVICE — 3M™ IOBAN™ 2 ANTIMICROBIAL INCISE DRAPE 6651EZ: Brand: IOBAN™ 2

## (undated) DEVICE — REM POLYHESIVE ADULT PATIENT RETURN ELECTRODE: Brand: VALLEYLAB

## (undated) DEVICE — CYSTO/BLADDER IRRIGATION SET, REGULATING CLAMP

## (undated) DEVICE — SUTURE CHROMIC GUT SZ 4-0 L27IN ABSRB BRN L17MM RB-1 1/2 U203H

## (undated) DEVICE — FORCEPS BX L240CM JAW DIA2.8MM L CAP W/ NDL MIC MESH TOOTH

## (undated) DEVICE — SUTURE STRATAFIX SPRL SZ 1 L5IN ABSRB VLT CT-1 L36MM 1/2 SXPD2B401

## (undated) DEVICE — CUTTING LOOP, BIPOLAR, 0.30MM, 24/26 FR.: Brand: N.A.

## (undated) DEVICE — SUTURE MCRYL SZ 2-0 L27IN ABSRB UD CP-1 1 L36MM 1/2 CIR REV Y266H

## (undated) DEVICE — SOLUTION IRRIG 3000ML 0.9% SOD CHL FLX CONT 0797208] ICU MEDICAL INC]

## (undated) DEVICE — ELECTRODE PT RET AD L9FT HI MOIST COND ADH HYDRGEL CORDED

## (undated) DEVICE — Z DISCONTINUED USE 2423037 APPLICATOR MEDICATED 3 CC CHLORHEXIDINE GLUC 2% CHLORAPREP